# Patient Record
Sex: MALE | Race: WHITE | NOT HISPANIC OR LATINO | Employment: OTHER | URBAN - METROPOLITAN AREA
[De-identification: names, ages, dates, MRNs, and addresses within clinical notes are randomized per-mention and may not be internally consistent; named-entity substitution may affect disease eponyms.]

---

## 2022-01-21 ENCOUNTER — OFFICE VISIT (OUTPATIENT)
Dept: PRIMARY CARE CLINIC | Facility: CLINIC | Age: 72
End: 2022-01-21
Payer: COMMERCIAL

## 2022-01-21 ENCOUNTER — TELEPHONE (OUTPATIENT)
Dept: OPHTHALMOLOGY | Facility: CLINIC | Age: 72
End: 2022-01-21
Payer: MEDICARE

## 2022-01-21 ENCOUNTER — HOSPITAL ENCOUNTER (OUTPATIENT)
Dept: RADIOLOGY | Facility: HOSPITAL | Age: 72
Discharge: HOME | End: 2022-01-21
Attending: FAMILY MEDICINE
Payer: COMMERCIAL

## 2022-01-21 VITALS
OXYGEN SATURATION: 97 % | RESPIRATION RATE: 16 BRPM | SYSTOLIC BLOOD PRESSURE: 134 MMHG | TEMPERATURE: 98 F | HEIGHT: 71 IN | BODY MASS INDEX: 26.55 KG/M2 | DIASTOLIC BLOOD PRESSURE: 72 MMHG | WEIGHT: 189.63 LBS | HEART RATE: 60 BPM

## 2022-01-21 DIAGNOSIS — Z00.00 ANNUAL PHYSICAL EXAM: Primary | ICD-10-CM

## 2022-01-21 DIAGNOSIS — Z12.5 SCREENING FOR PROSTATE CANCER: ICD-10-CM

## 2022-01-21 DIAGNOSIS — E03.9 HYPOTHYROIDISM, UNSPECIFIED TYPE: ICD-10-CM

## 2022-01-21 DIAGNOSIS — R35.1 BENIGN PROSTATIC HYPERPLASIA WITH NOCTURIA: ICD-10-CM

## 2022-01-21 DIAGNOSIS — H53.2 DOUBLE VISION: ICD-10-CM

## 2022-01-21 DIAGNOSIS — M25.561 ACUTE PAIN OF RIGHT KNEE: ICD-10-CM

## 2022-01-21 DIAGNOSIS — N40.1 BENIGN PROSTATIC HYPERPLASIA WITH NOCTURIA: ICD-10-CM

## 2022-01-21 DIAGNOSIS — E78.2 MIXED HYPERLIPIDEMIA: ICD-10-CM

## 2022-01-21 PROBLEM — N40.0 BPH (BENIGN PROSTATIC HYPERPLASIA): Status: ACTIVE | Noted: 2022-01-21

## 2022-01-21 PROBLEM — E78.5 HYPERLIPIDEMIA: Status: ACTIVE | Noted: 2022-01-21

## 2022-01-21 PROCEDURE — 99203 OFFICE O/P NEW LOW 30 MIN: CPT | Mod: S$GLB,,, | Performed by: FAMILY MEDICINE

## 2022-01-21 PROCEDURE — 73560 XR KNEE ORTHO RIGHT: ICD-10-PCS | Mod: 26,LT,, | Performed by: RADIOLOGY

## 2022-01-21 PROCEDURE — 99999 PR PBB SHADOW E&M-EST. PATIENT-LVL V: ICD-10-PCS | Mod: PBBFAC,,, | Performed by: FAMILY MEDICINE

## 2022-01-21 PROCEDURE — 73562 X-RAY EXAM OF KNEE 3: CPT | Mod: 26,RT,, | Performed by: RADIOLOGY

## 2022-01-21 PROCEDURE — 73562 X-RAY EXAM OF KNEE 3: CPT | Mod: TC,PN,RT

## 2022-01-21 PROCEDURE — 73562 XR KNEE ORTHO RIGHT: ICD-10-PCS | Mod: 26,RT,, | Performed by: RADIOLOGY

## 2022-01-21 PROCEDURE — 99203 PR OFFICE/OUTPT VISIT, NEW, LEVL III, 30-44 MIN: ICD-10-PCS | Mod: S$GLB,,, | Performed by: FAMILY MEDICINE

## 2022-01-21 PROCEDURE — 99999 PR PBB SHADOW E&M-EST. PATIENT-LVL V: CPT | Mod: PBBFAC,,, | Performed by: FAMILY MEDICINE

## 2022-01-21 PROCEDURE — 73560 X-RAY EXAM OF KNEE 1 OR 2: CPT | Mod: 26,LT,, | Performed by: RADIOLOGY

## 2022-01-21 PROCEDURE — 73560 X-RAY EXAM OF KNEE 1 OR 2: CPT | Mod: 59,TC,PN,LT

## 2022-01-21 PROCEDURE — 99215 OFFICE O/P EST HI 40 MIN: CPT | Mod: PBBFAC,PN | Performed by: FAMILY MEDICINE

## 2022-01-21 RX ORDER — LEVOTHYROXINE SODIUM 175 UG/1
175 CAPSULE ORAL DAILY
COMMUNITY
End: 2022-01-21 | Stop reason: SDUPTHER

## 2022-01-21 RX ORDER — LEVOTHYROXINE SODIUM 175 UG/1
175 CAPSULE ORAL DAILY
Qty: 90 CAPSULE | Refills: 1 | Status: SHIPPED | OUTPATIENT
Start: 2022-01-21 | End: 2022-07-13

## 2022-01-21 RX ORDER — ATORVASTATIN CALCIUM 40 MG/1
40 TABLET, FILM COATED ORAL DAILY
COMMUNITY
End: 2022-01-21 | Stop reason: SDUPTHER

## 2022-01-21 RX ORDER — TADALAFIL 5 MG/1
5 TABLET ORAL
COMMUNITY
End: 2022-01-21 | Stop reason: SDUPTHER

## 2022-01-21 RX ORDER — TADALAFIL 5 MG/1
5 TABLET ORAL DAILY PRN
Qty: 90 TABLET | Refills: 1 | Status: SHIPPED | OUTPATIENT
Start: 2022-01-21 | End: 2022-09-03 | Stop reason: SDUPTHER

## 2022-01-21 RX ORDER — FINASTERIDE 5 MG/1
5 TABLET, FILM COATED ORAL DAILY
Qty: 90 TABLET | Refills: 1 | Status: SHIPPED | OUTPATIENT
Start: 2022-01-21 | End: 2022-09-03 | Stop reason: SDUPTHER

## 2022-01-21 RX ORDER — FINASTERIDE 5 MG/1
5 TABLET, FILM COATED ORAL DAILY
COMMUNITY
End: 2022-01-21 | Stop reason: SDUPTHER

## 2022-01-21 RX ORDER — ATORVASTATIN CALCIUM 40 MG/1
40 TABLET, FILM COATED ORAL DAILY
Qty: 90 TABLET | Refills: 1 | Status: SHIPPED | OUTPATIENT
Start: 2022-01-21 | End: 2022-08-31

## 2022-01-21 NOTE — PATIENT INSTRUCTIONS
1.  Get labs drawn, they are fasting  2. We will get records and immunize if any needed  3. Get Mri knee  4. Start physical therapy, they will call you  5. Make appt with ophthalmology - scheduling should call you

## 2022-01-21 NOTE — PROGRESS NOTES
[facility] Note    Chief Complaint      Chief Complaint   Patient presents with    Establish Care       History of Present Illness      Thiago Thacker is a 72 y.o. male with chronic conditions of HLD, hypothyroid, BPH, vitiligo who presents today for:  Annual    Moved from [place] 5 months ago  Screening:   Colon- 2018 - repeat 5 years   PSA- due    Immunizations:   COVID- boost 10/21   Flu- 2021   Shingles- completed   Pneumonia- completed   Tetanus - unknown    Activity - awake 530, workout daily  Diet - oatmeal, meditteranean diet, pescatarian    No tob  Rare alcohol    Right knee pain 2 weeks, thinks pulled hamstring, gives out, limiting activity.  He reports recent eye exam and new glasses in [place] but feels vision in his right eye is blurred and sometimes double since.  Requests ophtho referral    Patient Care Team:  Traci Oconnell MD as PCP - General (Internal Medicine)     Health Maintenance:    There is no immunization history on file for this patient.   Health Maintenance   Topic Date Due    Hepatitis C Screening  Never done    Lipid Panel  Never done    TETANUS VACCINE  Never done        Past Medical History:  History reviewed. No pertinent past medical history.    Past Surgical History:   has a past surgical history that includes Inguinal hernia repair; Complete laser photovaporization of prostate; vein; and Blepharoplasty.    Family History:  family history includes Colon cancer (age of onset: 80) in his mother.     Social History:  Social History     Tobacco Use    Smoking status: Never Smoker    Smokeless tobacco: Never Used   Substance Use Topics    Alcohol use: Yes     Alcohol/week: 1.0 standard drink     Types: 1 Glasses of wine per week    Drug use: Never       Review of Systems   Constitutional: Negative for fever.   Respiratory: Negative for shortness of breath.    Cardiovascular: Negative for chest pain.   Gastrointestinal: Negative for change in bowel habit and change in  "bowel habit.   Genitourinary: Negative for bladder incontinence.        Medications:    Current Outpatient Medications:     atorvastatin (LIPITOR) 40 MG tablet, Take 1 tablet (40 mg total) by mouth once daily., Disp: 90 tablet, Rfl: 1    finasteride (PROSCAR) 5 mg tablet, Take 1 tablet (5 mg total) by mouth once daily., Disp: 90 tablet, Rfl: 1    levothyroxine 175 mcg Cap, Take 175 mcg by mouth once daily., Disp: 90 capsule, Rfl: 1    tadalafiL (CIALIS) 5 MG tablet, Take 1 tablet (5 mg total) by mouth daily as needed for Erectile Dysfunction., Disp: 90 tablet, Rfl: 1     Allergies:  Review of patient's allergies indicates:  No Known Allergies    Physical Exam      Vital Signs  Temp: 98.1 °F (36.7 °C)  Pulse: 60  Resp: 16  SpO2: 97 %  BP: 134/72  BP Location: Right arm  Pain Score: 0-No pain  Height and Weight  Height: 5' 11" (180.3 cm)  Weight: 86 kg (189 lb 9.5 oz)  BSA (Calculated - sq m): 2.08 sq meters  BMI (Calculated): 26.5  Weight in (lb) to have BMI = 25: 178.9             Physical Exam  Constitutional:       General: He is not in acute distress.     Appearance: Normal appearance.   HENT:      Right Ear: Tympanic membrane, ear canal and external ear normal.      Left Ear: Tympanic membrane, ear canal and external ear normal.      Nose: Nose normal.      Mouth/Throat:      Mouth: Mucous membranes are moist.      Pharynx: Oropharynx is clear. No oropharyngeal exudate or posterior oropharyngeal erythema.   Eyes:      Extraocular Movements: Extraocular movements intact.      Conjunctiva/sclera: Conjunctivae normal.      Pupils: Pupils are equal, round, and reactive to light.   Cardiovascular:      Rate and Rhythm: Normal rate and regular rhythm.      Pulses: Normal pulses.      Heart sounds: No murmur heard.  No friction rub. No gallop.    Pulmonary:      Effort: Pulmonary effort is normal.      Breath sounds: No wheezing, rhonchi or rales.   Abdominal:      General: Abdomen is flat. Bowel sounds are normal. " There is no distension.      Palpations: Abdomen is soft. There is no mass.      Tenderness: There is no abdominal tenderness.   Musculoskeletal:      Cervical back: Neck supple.      Right lower leg: No edema.      Left lower leg: No edema.      Comments: Bilateral knees - no edema erythema laxity or joint line tenderness, neg anterior/posterior drawer, neg candi   Lymphadenopathy:      Cervical: No cervical adenopathy.   Skin:     General: Skin is warm and dry.      Comments: Vitiligo changes to upper and lower extremities.    Neurological:      General: No focal deficit present.      Mental Status: He is alert.   Psychiatric:         Mood and Affect: Mood normal.         Behavior: Behavior normal.          Laboratory:  CBC:        CMP:            URINALYSIS:         LIPIDS:        TSH:        A1C:        Urine Microalbumin/Cr:          Other:             Assessment/Plan     Thiago Thacker is a 72 y.o.male with:    Annual physical exam  -     CBC Auto Differential; Future; Expected date: 01/21/2022  -     Comprehensive Metabolic Panel; Future; Expected date: 01/21/2022  -     Lipid Panel; Future; Expected date: 01/21/2022  -     HIV 1/2 Ag/Ab (4th Gen); Future; Expected date: 01/21/2022  -     Hepatitis C Antibody; Future; Expected date: 01/21/2022  Physical activity, diet, healthy lifestyle, alcohol, tobacco, screenings and immunizations discussed.    Benign prostatic hyperplasia with nocturia  - stable, continue current medication - tamsulosin and finasteride    Mixed hyperlipidemia  -     CBC Auto Differential; Future; Expected date: 01/21/2022  -     Lipid Panel; Future; Expected date: 01/21/2022  - stable, continue current medication    Hypothyroidism, unspecified type  -     CBC Auto Differential; Future; Expected date: 01/21/2022  -     T4, Free; Future; Expected date: 01/21/2022  -     TSH; Future; Expected date: 01/21/2022  - stable, continue current medication    Screening for prostate cancer  -     PSA,  Screening; Future; Expected date: 01/21/2022    Acute pain of right knee  -     X-ray Knee Ortho Right; Future; Expected date: 01/21/2022  -     Ambulatory referral/consult to Physical/Occupational Therapy; Future; Expected date: 01/28/2022  -     MRI Knee Without Contrast Right; Future; Expected date: 01/21/2022  Suspect meniscal injury, xray, MRI, PT. Declines meds    Double vision  -     Ambulatory referral/consult to Ophthalmology; Future; Expected date: 01/28/2022    Other orders  -     atorvastatin (LIPITOR) 40 MG tablet; Take 1 tablet (40 mg total) by mouth once daily.  Dispense: 90 tablet; Refill: 1  -     finasteride (PROSCAR) 5 mg tablet; Take 1 tablet (5 mg total) by mouth once daily.  Dispense: 90 tablet; Refill: 1  -     levothyroxine 175 mcg Cap; Take 175 mcg by mouth once daily.  Dispense: 90 capsule; Refill: 1  -     tadalafiL (CIALIS) 5 MG tablet; Take 1 tablet (5 mg total) by mouth daily as needed for Erectile Dysfunction.  Dispense: 90 tablet; Refill: 1         Chronic conditions status updated as per HPI.  Other than changes above, cont current medications and maintain follow up with specialists.  Return to clinic in Follow up in about 1 year (around 1/21/2023).      Traci Oconnell MD  [facility]

## 2022-01-21 NOTE — TELEPHONE ENCOUNTER
----- Message from Radha Eduardo MA sent at 1/21/2022 11:42 AM CST -----    ----- Message -----  From: Grace Solitario  Sent: 1/21/2022  10:57 AM CST  To: [facility] Clinical Support Staff    Dr. Traci Oconnell has put in a referral for Consult to Ophthalmology. Please assist in scheduling.    Double vision [H53.2]    Thanks

## 2022-01-25 ENCOUNTER — PATIENT MESSAGE (OUTPATIENT)
Dept: PRIMARY CARE CLINIC | Facility: CLINIC | Age: 72
End: 2022-01-25
Payer: MEDICARE

## 2022-01-25 ENCOUNTER — LAB VISIT (OUTPATIENT)
Dept: LAB | Facility: HOSPITAL | Age: 72
End: 2022-01-25
Attending: FAMILY MEDICINE
Payer: MEDICARE

## 2022-01-25 DIAGNOSIS — Z00.00 ANNUAL PHYSICAL EXAM: ICD-10-CM

## 2022-01-25 DIAGNOSIS — E03.9 HYPOTHYROIDISM, UNSPECIFIED TYPE: ICD-10-CM

## 2022-01-25 DIAGNOSIS — E78.2 MIXED HYPERLIPIDEMIA: ICD-10-CM

## 2022-01-25 DIAGNOSIS — Z12.5 SCREENING FOR PROSTATE CANCER: ICD-10-CM

## 2022-01-25 LAB
ALBUMIN SERPL BCP-MCNC: 3.6 G/DL (ref 3.5–5.2)
ALP SERPL-CCNC: 96 U/L (ref 55–135)
ALT SERPL W/O P-5'-P-CCNC: 27 U/L (ref 10–44)
ANION GAP SERPL CALC-SCNC: 10 MMOL/L (ref 8–16)
AST SERPL-CCNC: 27 U/L (ref 10–40)
BASOPHILS # BLD AUTO: 0.04 K/UL (ref 0–0.2)
BASOPHILS NFR BLD: 1 % (ref 0–1.9)
BILIRUB SERPL-MCNC: 0.7 MG/DL (ref 0.1–1)
BUN SERPL-MCNC: 25 MG/DL (ref 8–23)
CALCIUM SERPL-MCNC: 9.5 MG/DL (ref 8.7–10.5)
CHLORIDE SERPL-SCNC: 104 MMOL/L (ref 95–110)
CHOLEST SERPL-MCNC: 118 MG/DL (ref 120–199)
CHOLEST/HDLC SERPL: 2.2 {RATIO} (ref 2–5)
CO2 SERPL-SCNC: 26 MMOL/L (ref 23–29)
COMPLEXED PSA SERPL-MCNC: 0.64 NG/ML (ref 0–4)
CREAT SERPL-MCNC: 1 MG/DL (ref 0.5–1.4)
DIFFERENTIAL METHOD: ABNORMAL
EOSINOPHIL # BLD AUTO: 0 K/UL (ref 0–0.5)
EOSINOPHIL NFR BLD: 0.5 % (ref 0–8)
ERYTHROCYTE [DISTWIDTH] IN BLOOD BY AUTOMATED COUNT: 12.1 % (ref 11.5–14.5)
EST. GFR  (AFRICAN AMERICAN): >60 ML/MIN/1.73 M^2
EST. GFR  (NON AFRICAN AMERICAN): >60 ML/MIN/1.73 M^2
GLUCOSE SERPL-MCNC: 98 MG/DL (ref 70–110)
HCT VFR BLD AUTO: 44.8 % (ref 40–54)
HCV AB SERPL QL IA: NEGATIVE
HDLC SERPL-MCNC: 54 MG/DL (ref 40–75)
HDLC SERPL: 45.8 % (ref 20–50)
HGB BLD-MCNC: 14.8 G/DL (ref 14–18)
HIV 1+2 AB+HIV1 P24 AG SERPL QL IA: NEGATIVE
IMM GRANULOCYTES # BLD AUTO: 0.02 K/UL (ref 0–0.04)
IMM GRANULOCYTES NFR BLD AUTO: 0.5 % (ref 0–0.5)
LDLC SERPL CALC-MCNC: 56.6 MG/DL (ref 63–159)
LYMPHOCYTES # BLD AUTO: 0.7 K/UL (ref 1–4.8)
LYMPHOCYTES NFR BLD: 17.9 % (ref 18–48)
MCH RBC QN AUTO: 32 PG (ref 27–31)
MCHC RBC AUTO-ENTMCNC: 33 G/DL (ref 32–36)
MCV RBC AUTO: 97 FL (ref 82–98)
MONOCYTES # BLD AUTO: 0.3 K/UL (ref 0.3–1)
MONOCYTES NFR BLD: 8.6 % (ref 4–15)
NEUTROPHILS # BLD AUTO: 2.8 K/UL (ref 1.8–7.7)
NEUTROPHILS NFR BLD: 71.5 % (ref 38–73)
NONHDLC SERPL-MCNC: 64 MG/DL
NRBC BLD-RTO: 0 /100 WBC
PLATELET # BLD AUTO: 113 K/UL (ref 150–450)
PMV BLD AUTO: 11.1 FL (ref 9.2–12.9)
POTASSIUM SERPL-SCNC: 4.1 MMOL/L (ref 3.5–5.1)
PROT SERPL-MCNC: 6.9 G/DL (ref 6–8.4)
RBC # BLD AUTO: 4.62 M/UL (ref 4.6–6.2)
SODIUM SERPL-SCNC: 140 MMOL/L (ref 136–145)
T4 FREE SERPL-MCNC: 0.98 NG/DL (ref 0.71–1.51)
TRIGL SERPL-MCNC: 37 MG/DL (ref 30–150)
TSH SERPL DL<=0.005 MIU/L-ACNC: 3.41 UIU/ML (ref 0.4–4)
WBC # BLD AUTO: 3.97 K/UL (ref 3.9–12.7)

## 2022-01-25 PROCEDURE — 85025 COMPLETE CBC W/AUTO DIFF WBC: CPT | Performed by: FAMILY MEDICINE

## 2022-01-25 PROCEDURE — 80053 COMPREHEN METABOLIC PANEL: CPT | Performed by: FAMILY MEDICINE

## 2022-01-25 PROCEDURE — 84153 ASSAY OF PSA TOTAL: CPT | Performed by: FAMILY MEDICINE

## 2022-01-25 PROCEDURE — 84443 ASSAY THYROID STIM HORMONE: CPT | Performed by: FAMILY MEDICINE

## 2022-01-25 PROCEDURE — 87389 HIV-1 AG W/HIV-1&-2 AB AG IA: CPT | Performed by: FAMILY MEDICINE

## 2022-01-25 PROCEDURE — 86803 HEPATITIS C AB TEST: CPT | Performed by: FAMILY MEDICINE

## 2022-01-25 PROCEDURE — 36415 COLL VENOUS BLD VENIPUNCTURE: CPT | Mod: PN | Performed by: FAMILY MEDICINE

## 2022-01-25 PROCEDURE — 84439 ASSAY OF FREE THYROXINE: CPT | Performed by: FAMILY MEDICINE

## 2022-01-25 PROCEDURE — 80061 LIPID PANEL: CPT | Performed by: FAMILY MEDICINE

## 2022-01-25 RX ORDER — LEVOTHYROXINE SODIUM 175 UG/1
175 TABLET ORAL
Qty: 30 TABLET | Refills: 11 | Status: SHIPPED | OUTPATIENT
Start: 2022-01-25 | End: 2022-09-03 | Stop reason: SDUPTHER

## 2022-01-26 ENCOUNTER — PATIENT MESSAGE (OUTPATIENT)
Dept: PRIMARY CARE CLINIC | Facility: CLINIC | Age: 72
End: 2022-01-26
Payer: MEDICARE

## 2022-01-26 DIAGNOSIS — R01.1 HEART MURMUR: Primary | ICD-10-CM

## 2022-01-26 DIAGNOSIS — D69.6 THROMBOCYTOPENIA: ICD-10-CM

## 2022-01-26 RX ORDER — ASPIRIN 81 MG/1
81 TABLET ORAL DAILY
COMMUNITY

## 2022-01-26 NOTE — TELEPHONE ENCOUNTER
Fish oil & aspirin updated on pt's medication list, as reported in the message. Please see MyChart msg.

## 2022-01-27 RX ORDER — MULTIVITAMIN
1 TABLET ORAL DAILY
COMMUNITY

## 2022-01-28 ENCOUNTER — HOSPITAL ENCOUNTER (OUTPATIENT)
Dept: RADIOLOGY | Facility: HOSPITAL | Age: 72
Discharge: HOME | End: 2022-01-28
Attending: FAMILY MEDICINE
Payer: COMMERCIAL

## 2022-01-28 DIAGNOSIS — M25.561 ACUTE PAIN OF RIGHT KNEE: ICD-10-CM

## 2022-01-28 PROCEDURE — 73721 MRI JNT OF LWR EXTRE W/O DYE: CPT | Mod: TC,RT

## 2022-01-28 PROCEDURE — 73721 MRI JNT OF LWR EXTRE W/O DYE: CPT | Mod: 26,RT,, | Performed by: RADIOLOGY

## 2022-01-28 PROCEDURE — 73721 MRI KNEE WITHOUT CONTRAST RIGHT: ICD-10-PCS | Mod: 26,RT,, | Performed by: RADIOLOGY

## 2022-01-30 ENCOUNTER — PATIENT MESSAGE (OUTPATIENT)
Dept: PRIMARY CARE CLINIC | Facility: CLINIC | Age: 72
End: 2022-01-30
Payer: MEDICARE

## 2022-01-30 DIAGNOSIS — M17.11 OSTEOARTHRITIS OF RIGHT KNEE, UNSPECIFIED OSTEOARTHRITIS TYPE: Primary | ICD-10-CM

## 2022-01-30 DIAGNOSIS — S83.206A TEAR OF MENISCUS OF RIGHT KNEE, UNSPECIFIED MENISCUS, UNSPECIFIED TEAR TYPE, UNSPECIFIED WHETHER OLD OR CURRENT TEAR: ICD-10-CM

## 2022-02-02 DIAGNOSIS — M25.561 CHRONIC PAIN OF RIGHT KNEE: Primary | ICD-10-CM

## 2022-02-02 DIAGNOSIS — G89.29 CHRONIC PAIN OF RIGHT KNEE: Primary | ICD-10-CM

## 2022-02-02 NOTE — PROGRESS NOTES
CC: Right knee pain    72 y.o. Male who presents as a new patient to me. Patient referred from Dr. Traci Oconnell.  Complaint is right knee pain.  Medially based predominantly.  He does have some patellofemoral complaints.  Pain present with high impact activity.  Active gentleman.  Runs and lifts weights.  Denies any specific injury mechanism.  Onset after a drive to [place].  Treatment has included activity modifications, rest.  He does describe some intermittent clicking and catching in the knee.  Denies any significant pre-existing issues.  Localizes some hamstring related soreness over the posterior aspect of the knee.  Nothing more proximal.  No radicular complaints.  No low back pain at this time.    REVIEW OF SYSTEMS:   Constitution: Negative. Negative for chills, fever and night sweats.    Hematologic/Lymphatic: Negative for bleeding problem. Does not bruise/bleed easily.   Skin: Negative for dry skin, itching and rash.   Musculoskeletal: Negative for falls. Positive for right knee pain and muscle weakness.     All other review of symptoms were reviewed and found to be noncontributory.     PAST MEDICAL HISTORY:   History reviewed. No pertinent past medical history.    PAST SURGICAL HISTORY:   Past Surgical History:   Procedure Laterality Date    BLEPHAROPLASTY      COMPLETE LASER PHOTOVAPORIZATION OF PROSTATE      INGUINAL HERNIA REPAIR      vein         FAMILY HISTORY:   Family History   Problem Relation Age of Onset    Colon cancer Mother 80       SOCIAL HISTORY:   Social History     Socioeconomic History    Marital status: Single   Tobacco Use    Smoking status: Never Smoker    Smokeless tobacco: Never Used   Substance and Sexual Activity    Alcohol use: Yes     Alcohol/week: 1.0 standard drink     Types: 1 Glasses of wine per week    Drug use: Never    Sexual activity: Not Currently     Partners: Female       MEDICATIONS:     Current Outpatient Medications:     arginine/ornithine/lysine  (ARGININE-LYSINE-ORNITHINE ORAL), Take 1 capsule by mouth once daily., Disp: , Rfl:     ascorbate calcium (RUPERT-C ORAL), Take 1 capsule by mouth once daily., Disp: , Rfl:     aspirin (ECOTRIN) 81 MG EC tablet, Take 81 mg by mouth once daily., Disp: , Rfl:     atorvastatin (LIPITOR) 40 MG tablet, Take 1 tablet (40 mg total) by mouth once daily., Disp: 90 tablet, Rfl: 1    finasteride (PROSCAR) 5 mg tablet, Take 1 tablet (5 mg total) by mouth once daily., Disp: 90 tablet, Rfl: 1    glucos-msm-collagen-C-Mn-hrb21 500-333-5 mg Cap, Take 1 capsule by mouth once daily., Disp: , Rfl:     GLYCINE ORAL, Take 1 capsule by mouth once daily. Glycine + N-Acetyl-Cysteine, Disp: , Rfl:     GREEN TEA EXTRACT ORAL, Take 1 capsule by mouth once daily., Disp: , Rfl:     levothyroxine (SYNTHROID, LEVOTHROID) 175 MCG tablet, Take 1 tablet (175 mcg total) by mouth before breakfast., Disp: 30 tablet, Rfl: 11    multivitamin (THERAGRAN) per tablet, Take 1 tablet by mouth once daily., Disp: , Rfl:     omega-3/dha/epa/fish oil (OMEGA-3 FISH OIL ORAL), Take 4 g by mouth once daily., Disp: , Rfl:     QUERCETIN DIHYDRATE, BULK, MISC, by Misc.(Non-Drug; Combo Route) route. Quercetin, Disp: , Rfl:     RESVERATROL ORAL, Take by mouth. Resveratrol (Longevinex formulation), Disp: , Rfl:     rutin/quercetin/bioflav/bilber (BILBERRY EXTRACT ORAL), Take 1 capsule by mouth once daily., Disp: , Rfl:     tadalafiL (CIALIS) 5 MG tablet, Take 1 tablet (5 mg total) by mouth daily as needed for Erectile Dysfunction., Disp: 90 tablet, Rfl: 1    turmeric (CURCUMIN MISC), by Misc.(Non-Drug; Combo Route) route., Disp: , Rfl:     ubidecarenone (COQ-10 ORAL), Take 1 capsule by mouth once daily., Disp: , Rfl:     vit C/E/Zn/coppr/lutein/zeaxan (PRESERVISION AREDS-2 ORAL), Take by mouth. AREDS 2 (B & L, Disp: , Rfl:     celecoxib (CELEBREX) 200 MG capsule, Take 1 capsule (200 mg total) by mouth once daily., Disp: 60 capsule, Rfl: 2     "levothyroxine 175 mcg Cap, Take 175 mcg by mouth once daily., Disp: 90 capsule, Rfl: 1    ALLERGIES:   Review of patient's allergies indicates:  No Known Allergies     PHYSICAL EXAMINATION:  /87   Pulse 63   Ht 5' 11" (1.803 m)   Wt 85.7 kg (189 lb)   BMI 26.36 kg/m²   General: Well-developed well-nourished 72 y.o. malein no acute distress   Cardiovascular: Regular rhythm by palpation of distal pulse, normal color and temperature, no concerning varicosities on symptomatic side   Lungs: No labored breathing or wheezing appreciated   Neuro: Alert and oriented ×3   Psychiatric: well oriented to person, place and time, demonstrates normal mood and affect   Skin: No rashes, lesions or ulcers, normal temperature, turgor, and texture on involved extremity    Ortho/SPM Exam  Examination of the right knee demonstrates intact extensor mechanism. No effusion or prepatellar swelling. Central patellar tracking. No patellar apprehension.  Mildly decreased patellar mobility.  Medial greater than lateral joint line tenderness.  Positive Jose Manuel's for pain predominant over the medial joint line.  Ligamentously stable.  Range of motion from full extension to 130° of flexion.  Pain with forced flexion and extension.    IMAGING:  X-rays including standing, weight bearing AP and flexion bilateral knees, RIGHT knee lateral and sunrise views ordered and images reviewed by me show:     No fracture or dislocation.  No joint effusion.  Patellofemoral joint space narrowing predominantly medially with medial and lateral joint space narrowing     MRI right knee reviewed by me and discussed with patient. Study shows:  1. Complex and horizontal tears body segment/posterior horn medial meniscus with surrounding synovitis.  2. Edema signal about the superficial and deep MCL, likely reactive to aforementioned medial meniscus tear.  Sequela of a sprain can present with similar findings but is felt to be less likely given absent history of " trauma.  3. Tricompartmental osteoarthritis most pronounced within the patellofemoral and medial tibiofemoral compartments.  4. Proximal patellar tendinosis.  5. Quadriceps fat pad edema with a posterior convex margin, findings that can be seen in the setting of quadriceps fat pad impingement.  6. Small joint effusion with synovitis.     ASSESSMENT:      ICD-10-CM ICD-9-CM   1. Osteoarthritis of right knee, unspecified osteoarthritis type  M17.11 715.96   2. Complex tear of medial meniscus of right knee, unspecified whether old or current tear, initial encounter  S83.231A 836.0   3. Old complex tear of lateral meniscus of right knee  M23.200 717.40     PLAN:     -Findings and treatment options were discussed with the patient.  This is a primary degenerative process.  He does have some underlying chondromalacia and DJD.  Initial conservative treatment recommended.  -Proceeded with Left knee CSI.  -Prescribed Celebrex.  -Referral to PT for functional strengthening.  -RTC in 6 weeks as needed.  He does have some underlying mechanical symptoms.  Limited goals arthroscopy is an option should he fail an initial course of non operative treatment.  -All questions answered    Large Joint Aspiration/Injection: R knee    Date/Time: 2/7/2022 3:30 PM  Performed by: JEREMI King MD  Authorized by: JEREMI King MD     Consent Done?:  Yes (Verbal)  Indications:  Pain  Site marked: the procedure site was marked    Timeout: prior to procedure the correct patient, procedure, and site was verified    Prep: patient was prepped and draped in usual sterile fashion      Local anesthesia used?: Yes    Local anesthetic:  Co-phenylcaine spray (0.2% Naropin)  Anesthetic total (ml):  4      Details:  Needle Size:  22 G  Ultrasonic Guidance for needle placement?: No    Approach:  Lateral  Location:  Knee  Site:  R knee  Medications:  40 mg triamcinolone acetonide 40 mg/mL  Patient tolerance:  Patient tolerated the procedure well with  no immediate complications

## 2022-02-07 ENCOUNTER — OFFICE VISIT (OUTPATIENT)
Dept: SPORTS MEDICINE | Facility: CLINIC | Age: 72
End: 2022-02-07
Payer: COMMERCIAL

## 2022-02-07 ENCOUNTER — HOSPITAL ENCOUNTER (OUTPATIENT)
Dept: RADIOLOGY | Facility: HOSPITAL | Age: 72
Discharge: HOME | End: 2022-02-07
Attending: ORTHOPAEDIC SURGERY
Payer: COMMERCIAL

## 2022-02-07 VITALS
SYSTOLIC BLOOD PRESSURE: 132 MMHG | WEIGHT: 189 LBS | DIASTOLIC BLOOD PRESSURE: 87 MMHG | HEIGHT: 71 IN | HEART RATE: 63 BPM | BODY MASS INDEX: 26.46 KG/M2

## 2022-02-07 DIAGNOSIS — M17.11 OSTEOARTHRITIS OF RIGHT KNEE, UNSPECIFIED OSTEOARTHRITIS TYPE: Primary | ICD-10-CM

## 2022-02-07 DIAGNOSIS — M25.561 CHRONIC PAIN OF RIGHT KNEE: ICD-10-CM

## 2022-02-07 DIAGNOSIS — G89.29 CHRONIC PAIN OF RIGHT KNEE: ICD-10-CM

## 2022-02-07 DIAGNOSIS — M23.200 OLD COMPLEX TEAR OF LATERAL MENISCUS OF RIGHT KNEE: ICD-10-CM

## 2022-02-07 DIAGNOSIS — S83.231A COMPLEX TEAR OF MEDIAL MENISCUS OF RIGHT KNEE, UNSPECIFIED WHETHER OLD OR CURRENT TEAR, INITIAL ENCOUNTER: ICD-10-CM

## 2022-02-07 PROCEDURE — 73560 XR KNEE 1 OR 2 VIEW BILATERAL: ICD-10-PCS | Mod: 26,,, | Performed by: RADIOLOGY

## 2022-02-07 PROCEDURE — 99999 PR PBB SHADOW E&M-EST. PATIENT-LVL IV: ICD-10-PCS | Mod: PBBFAC,,, | Performed by: ORTHOPAEDIC SURGERY

## 2022-02-07 PROCEDURE — 99204 OFFICE O/P NEW MOD 45 MIN: CPT | Mod: 25,S$GLB,, | Performed by: ORTHOPAEDIC SURGERY

## 2022-02-07 PROCEDURE — 99999 PR PBB SHADOW E&M-EST. PATIENT-LVL IV: CPT | Mod: PBBFAC,,, | Performed by: ORTHOPAEDIC SURGERY

## 2022-02-07 PROCEDURE — 20610 LARGE JOINT ASPIRATION/INJECTION: R KNEE: ICD-10-PCS | Mod: RT,S$GLB,, | Performed by: ORTHOPAEDIC SURGERY

## 2022-02-07 PROCEDURE — 20610 DRAIN/INJ JOINT/BURSA W/O US: CPT | Mod: PBBFAC,PN,LT | Performed by: ORTHOPAEDIC SURGERY

## 2022-02-07 PROCEDURE — 99214 OFFICE O/P EST MOD 30 MIN: CPT | Mod: PBBFAC,PN,25 | Performed by: ORTHOPAEDIC SURGERY

## 2022-02-07 PROCEDURE — 73560 X-RAY EXAM OF KNEE 1 OR 2: CPT | Mod: TC,50,PN

## 2022-02-07 PROCEDURE — 99204 PR OFFICE/OUTPT VISIT, NEW, LEVL IV, 45-59 MIN: ICD-10-PCS | Mod: 25,S$GLB,, | Performed by: ORTHOPAEDIC SURGERY

## 2022-02-07 PROCEDURE — 73560 X-RAY EXAM OF KNEE 1 OR 2: CPT | Mod: 26,,, | Performed by: RADIOLOGY

## 2022-02-07 RX ORDER — CELECOXIB 200 MG/1
200 CAPSULE ORAL DAILY
Qty: 60 CAPSULE | Refills: 2 | Status: SHIPPED | OUTPATIENT
Start: 2022-02-07 | End: 2022-03-11

## 2022-02-07 RX ADMIN — TRIAMCINOLONE ACETONIDE 40 MG: 40 INJECTION, SUSPENSION INTRA-ARTICULAR; INTRAMUSCULAR at 03:02

## 2022-02-08 ENCOUNTER — CLINICAL SUPPORT (OUTPATIENT)
Dept: REHABILITATION | Facility: HOSPITAL | Age: 72
End: 2022-02-08
Attending: FAMILY MEDICINE
Payer: MEDICARE

## 2022-02-08 DIAGNOSIS — M25.561 ACUTE PAIN OF RIGHT KNEE: ICD-10-CM

## 2022-02-08 DIAGNOSIS — M25.661 DECREASED RANGE OF MOTION (ROM) OF RIGHT KNEE: ICD-10-CM

## 2022-02-08 DIAGNOSIS — M62.81 MUSCLE WEAKNESS OF LOWER EXTREMITY: ICD-10-CM

## 2022-02-08 PROCEDURE — 97161 PT EVAL LOW COMPLEX 20 MIN: CPT | Mod: PO

## 2022-02-08 NOTE — PLAN OF CARE
[facility]   Physical Therapy Initial Evaluation     Date: 2/8/2022   Name: Thiago Thacker  Clinic Number: [id number]    Therapy Diagnosis:   Encounter Diagnoses   Name Primary?    Acute pain of right knee     Muscle weakness of lower extremity     Decreased range of motion (ROM) of right knee      Physician: Traci Oconnell MD    Physician Orders: PT Eval and Treat   Medical Diagnosis from Referral: Acute pain of right knee [M25.561]  Evaluation Date: 2/8/2022  Authorization Period Expiration: 1/21/23  Plan of Care Expiration: 5/8/22  Progress Note Due: 3/10/22  Visit # / Visits authorized: 1/ 1   FOTO: 1/3    Precautions: Standard     Time In: 10:35 am  Time Out: 11:13 am  Total Appointment Time (timed & untimed codes): 43 minutes      SUBJECTIVE     Date of onset: early January (approx 1 month)    History of current condition - Thiago reports: he is a regular exerciser and after a drive up to [place] with daily stops for exercise (treadmill and elliptical followed by weight lifting) his knee began to hurt. He has been having hamstring pain along with knee pain and some soreness in the R foot. He feels fine when not in weightbearing. He received a steroid shot yesterday which has improved the pain. He does not have a specific mechanism of injury. He does experience catching and clicking in the knee    Falls: no    Imaging, x-ray 2/2/22: No acute fractures.  No malalignment.  No significant narrowing of tibiofemoral joint spaces.  Minimal spurring about the tibial spines.  MRI 1/21/22: Impression:  1. Complex and horizontal tears body segment/posterior horn medial meniscus with surrounding synovitis.  2. Edema signal about the superficial and deep MCL, likely reactive to aforementioned medial meniscus tear.  Sequela of a sprain can present with similar findings but is felt to be less likely given absent history of trauma.  3. Tricompartmental osteoarthritis most pronounced within the  patellofemoral and medial tibiofemoral compartments.  4. Proximal patellar tendinosis.  5. Quadriceps fat pad edema with a posterior convex margin, findings that can be seen in the setting of quadriceps fat pad impingement.  6. Small joint effusion with synovitis.    Prior Therapy: not for his knee  Social History:  lives alone, no steps  Occupation: neuropsychologist  Prior Level of Function: independent  Current Level of Function: unable to run, difficulty with walking and elliptical    Pain:  Current 5/10, worst 6/10, best 0/10   Location: right knee      Description: Throbbing  Aggravating Factors: Walking and weight bearing  Easing Factors: stretch every morning    Patients goals: reduce pain, return to running     Medical History:   No past medical history on file.    Surgical History:   Thiago Thacker  has a past surgical history that includes Inguinal hernia repair; Complete laser photovaporization of prostate; vein; and Blepharoplasty.    Medications:   Thiago has a current medication list which includes the following prescription(s): arginine/ornithine/lysine, ascorbate calcium, aspirin, atorvastatin, celecoxib, finasteride, glucos-[facility]-collagen-c-mn-hrb21, glycine, green tea extract, levothyroxine, levothyroxine, multivitamin, omega-3/dha/epa/fish oil, quercetin dihydrate, resveratrol, rutin/quercetin/bioflav/bilber, tadalafil, turmeric, ubidecarenone, and vit c/e/zn/coppr/lutein/zeaxan.    Allergies:   Review of patient's allergies indicates:  No Known Allergies       OBJECTIVE     Observation: Pt does not achieve hip extension during gait. He also demonstrates mild hip drop on RLE, and increased rotation through lumbar spine likely to achieve forward limb advancement.     Functional tests:   SL squat: NT  SLS EO: NT  SLS EC: NT    Range of Motion (Active):   Knee Right  Left    Flexion 125 130   Extension Lacking 4 0       Lower Extremity Strength  Right LE  Left LE    Quadriceps: 4/5 with pain Quadriceps: 5/5    Hamstrings: 4/5 Hamstrings: 4+/5   Hip flexion (seated): 4/5 Hip flexion (seated): 4+/5   Hip extension:  4-/5 Hip extension: 4-/5   PGM: 4-/5 PGM:  4-/5   Hip ABD:  4-/5 Hip ABD:  4+/5     Special Tests:   Right Left   Beltran's Test + -   Thessaly's Test + medial -   Patellar Grind Test + +     Joint Mobility: normal patella mobility in all planes bilaterally     Palpation: tenderness at medial joint line R, tenderness at ATFL. Hypertonicity in hamstrings    Sensation: intact to light touch    Flexibility:    Hamstrings: R = mod limitation ; L = mod limitation    Esther's test: R = no limitation ; L = no limitation   Dylan test: R = pos ; L = pos    Edema: NT but visible swelling inferior to patella          Limitation/Restriction for FOTO knee Survey    Therapist reviewed FOTO scores for Thiago Thacker on 2/8/2022.   FOTO documents entered into EPIC - see Media section.    Limitation Score: 48%         TREATMENT     Total Treatment time (time-based codes) separate from Evaluation: 0 minutes      Thiago received the treatments listed below:      therapeutic exercises to develop strength, endurance, ROM and flexibility for 0 minutes including:  Consider:  Quad sets  Heels slides  SLR  SAQ  LAQ  Bridges  Clams  SL hip abduction  Hamstring stretch  Prone quad stretch  Hip flexor stretch off side of table  TKE    manual therapy techniques: Joint mobilizations and Soft tissue Mobilization were applied for 0 minutes, including:      PATIENT EDUCATION AND HOME EXERCISES     Education provided:   - education on condition    Written Home Exercises Provided: not provided today. Exercises were reviewed and Thiago was able to demonstrate them prior to the end of the session.  Thiago demonstrated good  understanding of the education provided. See EMR under Patient Instructions for exercises provided during therapy sessions.    ASSESSMENT     Thiago is a 72 y.o. male referred to outpatient Physical Therapy with a medical diagnosis of Acute  pain of right knee [M25.561]. Patient presents with limited LE flexibility, positive special tests for meniscus pathology which is confirmed by MRI, decreased LE strength, and limited LE ROM. These deficits have resulted in difficulty with running, walking, and other weightbearing activities.    Patient prognosis is Good.   Patientt will benefit from skilled outpatient Physical Therapy to address the deficits stated above and in the chart below, provide patient /family education, and to maximize patientt's level of independence.     Plan of care discussed with patient: Yes  Patient's spiritual, cultural and educational needs considered and patient is agreeable to the plan of care and goals as stated below:     Anticipated Barriers for therapy: scheduling    Medical Necessity is demonstrated by the following  History  Co-morbidities and personal factors that may impact the plan of care Co-morbidities:   HLD, hypothyroid, BPH    Personal Factors:   age     low   Examination  Body Structures and Functions, activity limitations and participation restrictions that may impact the plan of care Body Regions:   lower extremities    Body Systems:    gross symmetry  ROM  strength  gross coordinated movement  balance  gait    Participation Restrictions:   Running, prolonged sitting, standing, or walking    Activity limitations:   Learning and applying knowledge  no deficits    General Tasks and Commands  no deficits    Communication  no deficits    Mobility  lifting and carrying objects  walking    Self care  no deficits    Domestic Life  shopping  cooking  doing house work (cleaning house, washing dishes, laundry)  assisting others    Interactions/Relationships  no deficits    Life Areas  no deficits    Community and Social Life  community life  recreation and leisure         low   Clinical Presentation stable and uncomplicated low   Decision Making/ Complexity Score: low     Goals:  GOALS: Short Term Goals:  5 weeks  1.Report  decreased R knee pain  < / =  4/10 at worst to increase tolerance for prolonged sitting  2. Increase knee ROM to 0 degrees extension in order to be able to perform ADLs without difficulty.  3. Increase strength by 1/3 MMT grade in BLE  to increase tolerance for ADL and work activities.  4. Pt to tolerate HEP to improve ROM and independence with ADL's    Long Term Goals: 10 weeks  1.Report decreased R knee pain < / = 2/10  to increase tolerance for running  2.Patient goal: Pt to be able to run on at least 4 days per week pain free to return to regular exercise  3.Increase strength to >/= 4+/5 in BLE  to increase tolerance for ADL and work activities.  4. Pt will report at CJ level (20-40% impaired) on FOTO knee to demonstrate increase in LE function with every day tasks.     PLAN   Plan of care Certification: 2/8/2022 to 5/9/2022.    Outpatient Physical Therapy 2 times weekly for 10 weeks to include the following interventions: Gait Training, Manual Therapy, Moist Heat/ Ice, Neuromuscular Re-ed, Patient Education, Therapeutic Activities and Therapeutic Exercise.     Kassi Chang, PT      I CERTIFY THE NEED FOR THESE SERVICES FURNISHED UNDER THIS PLAN OF TREATMENT AND WHILE UNDER MY CARE   Physician's comments:     Physician's Signature: ___________________________________________________

## 2022-02-10 ENCOUNTER — CLINICAL SUPPORT (OUTPATIENT)
Dept: REHABILITATION | Facility: HOSPITAL | Age: 72
End: 2022-02-10
Attending: FAMILY MEDICINE
Payer: MEDICARE

## 2022-02-10 ENCOUNTER — PATIENT MESSAGE (OUTPATIENT)
Dept: PRIMARY CARE CLINIC | Facility: CLINIC | Age: 72
End: 2022-02-10
Payer: MEDICARE

## 2022-02-10 DIAGNOSIS — M25.661 DECREASED RANGE OF MOTION (ROM) OF RIGHT KNEE: ICD-10-CM

## 2022-02-10 DIAGNOSIS — M62.81 MUSCLE WEAKNESS OF LOWER EXTREMITY: ICD-10-CM

## 2022-02-10 DIAGNOSIS — M25.561 ACUTE PAIN OF RIGHT KNEE: ICD-10-CM

## 2022-02-10 PROCEDURE — 97110 THERAPEUTIC EXERCISES: CPT | Mod: PO,CQ

## 2022-02-10 NOTE — PROGRESS NOTES
"[facility]   Physical Therapy Treatment Note     Name: Thiago Thacker  Clinic Number: [id number]    Therapy Diagnosis:   Encounter Diagnoses   Name Primary?    Acute pain of right knee     Muscle weakness of lower extremity     Decreased range of motion (ROM) of right knee      Physician: Traci Oconnell MD    Visit Date: 2/10/2022    Physician: Traci Oconnell MD     Physician Orders: PT Eval and Treat   Medical Diagnosis from Referral: Acute pain of right knee [M25.561]  Evaluation Date: 2/8/2022  Authorization Period Expiration: 1/21/23  Plan of Care Expiration: 5/8/22  Progress Note Due: 3/10/22  Visit # / Visits authorized: 1/ 1, 1/20  FOTO: 1/3     Precautions: Standard     PTA Visit #: 1/5     Time In: 1355  Time Out: 1438  Total Billable Time: 43 minutes    SUBJECTIVE     Pt reports:  Yesterday his R knee was bothering him but that it feels better today.      Response to previous treatment: Initial eval.   Functional change: Ongoing    Pain: 4/10  Location: right knee      OBJECTIVE     Objective Measures updated at progress report unless specified.     Treatment     Thiago received the treatments listed below:      therapeutic exercises to develop strength, endurance, ROM and flexibility for  minutes including:    Upright bike 6'  Quad sets x 20 3"  Heels slides  SLR 2 x 10   SAQ 2 x 10   LAQ 2 x 10 R  Bridges 2 x 10  Clams YTB 2 x 10 B  SL hip abduction 2 x 10 B  Hamstring stretch 3 x 30" B  Prone quad stretch 3 x 30" B  Hip flexor stretch off side of table 1'ea towel roll under buttock   TKE                Patient Education and Home Exercises     Home Exercises Provided and Patient Education Provided     Education provided:     Written Home Exercises Provided:    ASSESSMENT     Pt performed the above exercises this morning with good tolerance and no reports of pain.  Pt required verbal cueing on proper exercise form throughout PT treatment to target desired muscle group and maximize " exercise benefit.  Pt exhibited B hamstring tightness during self stretching exercises.     Thiago Is progressing well towards his goals.   Pt prognosis is Good.     Pt will continue to benefit from skilled outpatient physical therapy to address the deficits listed in the problem list box on initial evaluation, provide pt/family education and to maximize pt's level of independence in the home and community environment.     Pt's spiritual, cultural and educational needs considered and pt agreeable to plan of care and goals.     Anticipated barriers to physical therapy: Scheduling.    Goals:     GOALS: Short Term Goals:  5 weeks  1.Report decreased R knee pain  < / =  4/10 at worst to increase tolerance for prolonged sitting  2. Increase knee ROM to 0 degrees extension in order to be able to perform ADLs without difficulty.  3. Increase strength by 1/3 MMT grade in BLE  to increase tolerance for ADL and work activities.  4. Pt to tolerate HEP to improve ROM and independence with ADL's     Long Term Goals: 10 weeks  1.Report decreased R knee pain < / = 2/10  to increase tolerance for running  2.Patient goal: Pt to be able to run on at least 4 days per week pain free to return to regular exercise  3.Increase strength to >/= 4+/5 in BLE  to increase tolerance for ADL and work activities.  4. Pt will report at CJ level (20-40% impaired) on FOTO knee to demonstrate increase in LE function with every day tasks.     PLAN     Cont. PT POC.     Vincent Turner, PTA

## 2022-02-16 ENCOUNTER — OFFICE VISIT (OUTPATIENT)
Dept: OPTOMETRY | Facility: CLINIC | Age: 72
End: 2022-02-16
Payer: COMMERCIAL

## 2022-02-16 ENCOUNTER — TELEPHONE (OUTPATIENT)
Dept: OPHTHALMOLOGY | Facility: CLINIC | Age: 72
End: 2022-02-16
Payer: MEDICARE

## 2022-02-16 DIAGNOSIS — H52.4 MYOPIA OF BOTH EYES WITH REGULAR ASTIGMATISM AND PRESBYOPIA: ICD-10-CM

## 2022-02-16 DIAGNOSIS — H52.223 MYOPIA OF BOTH EYES WITH REGULAR ASTIGMATISM AND PRESBYOPIA: ICD-10-CM

## 2022-02-16 DIAGNOSIS — H53.2 DOUBLE VISION: Primary | ICD-10-CM

## 2022-02-16 DIAGNOSIS — H18.49 CORNEAL DELLEN OF LEFT EYE: ICD-10-CM

## 2022-02-16 DIAGNOSIS — H04.123 DRY EYE SYNDROME OF BOTH EYES: ICD-10-CM

## 2022-02-16 DIAGNOSIS — H25.13 SENILE NUCLEAR CATARACT, BILATERAL: ICD-10-CM

## 2022-02-16 DIAGNOSIS — H52.13 MYOPIA OF BOTH EYES WITH REGULAR ASTIGMATISM AND PRESBYOPIA: ICD-10-CM

## 2022-02-16 DIAGNOSIS — Z01.00 COMPLETE EYE EXAM, ENCOUNTER FOR: ICD-10-CM

## 2022-02-16 PROCEDURE — 92015 DETERMINE REFRACTIVE STATE: CPT | Mod: S$GLB,,, | Performed by: OPTOMETRIST

## 2022-02-16 PROCEDURE — 92015 PR REFRACTION: ICD-10-PCS | Mod: S$GLB,,, | Performed by: OPTOMETRIST

## 2022-02-16 PROCEDURE — 99999 PR PBB SHADOW E&M-EST. PATIENT-LVL III: ICD-10-PCS | Mod: PBBFAC,,, | Performed by: OPTOMETRIST

## 2022-02-16 PROCEDURE — 99999 PR PBB SHADOW E&M-EST. PATIENT-LVL III: CPT | Mod: PBBFAC,,, | Performed by: OPTOMETRIST

## 2022-02-16 PROCEDURE — 99213 OFFICE O/P EST LOW 20 MIN: CPT | Mod: PBBFAC | Performed by: OPTOMETRIST

## 2022-02-16 PROCEDURE — 92004 PR EYE EXAM, NEW PATIENT,COMPREHESV: ICD-10-PCS | Mod: S$GLB,,, | Performed by: OPTOMETRIST

## 2022-02-16 PROCEDURE — 92004 COMPRE OPH EXAM NEW PT 1/>: CPT | Mod: S$GLB,,, | Performed by: OPTOMETRIST

## 2022-02-16 NOTE — TELEPHONE ENCOUNTER
"----- Message from Shanthi Vizcaino sent at 2/16/2022  1:00 PM CST -----  Regarding: Cataract Eval OD>OS Appointment Request - Dr. 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 Medina today for complaints of blurred vision. She would like for him to be seen by Dr. Addison for cataract eval OD>OS. I was unable to find an appointment for the patient. Can you please contact Dr. Thacker to schedule him for an appointment? His contact number is [phone].    Thanks,    Shanthi"

## 2022-02-16 NOTE — PROGRESS NOTES
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              Comments     CC: Pt Dr. Salitz here for new patient complaints of blurred VA [place]   [place]. Pt states that he has horizontal double VA OD   only for the past 2 months. Pt states that he closes his OS in order for   double VA to go away. Pt states that he ordered his glasses 2 months ago   online. Pt states that he is not sure if double still exists with glasses   off. Patient denies diplopia, headaches, flashes, and pain. Pt states that   he has had floaters in the past but they have gone away.  CED: 2 months ago in [place], [place] ([facility])    (+) Changes in vision   (-) Pain  (-) Irritation   (-) Itching   (-) Flashes  (-) Floaters  (+) Glasses wearer  (-) CL wearer  (+) Uses eye gtts: (+)Systane gtts PRN OU for dry eyes (+)At's gel qhs OU   for dry eyes    Does patient want a refraction today? Yes.    (-) Eye injury  (-) Eye surgery   (+)POHx: (+)cataracts OU  (+)FOHx: (+)ARMD OU - father    (-)DM  No results found for: HGBA1C                 Last edited by Shanthi Vizcaino on 2/16/2022 11:09 AM. (History)            Assessment /Plan     For exam results, see Encounter Report.    Double vision  -     Ambulatory referral/consult to Ophthalmology    Myopia of both eyes with regular astigmatism and presbyopia    Corneal dellen of left eye    Dry eye syndrome of both eyes    Senile nuclear cataract, bilateral    Complete eye exam, encounter for      MONITOR. ED PT ON ALL EXAM FINDINGS.   H/O DIZZINESS X 6 MONTHS AGO; ASYMPTOMATIC AT VISIT. NO NEW SYMPTOMS REPORTED/ CONTINUE WITH PCP  AT'S PRN DISCUSSED   MILD NS OU; NO SURGICAL INTERVENTION NEEDED AT THIS TIME   H/O CORNEAL DELLEN OS, CONTINUE WITH AT'S PRN. CONSIDER K CONSULT IN FUTURE  RTC 1YR//PRN FOR REE/DFE

## 2022-02-21 RX ORDER — TRIAMCINOLONE ACETONIDE 40 MG/ML
40 INJECTION, SUSPENSION INTRA-ARTICULAR; INTRAMUSCULAR
Status: DISCONTINUED | OUTPATIENT
Start: 2022-02-07 | End: 2022-02-21 | Stop reason: HOSPADM

## 2022-02-22 ENCOUNTER — CLINICAL SUPPORT (OUTPATIENT)
Dept: REHABILITATION | Facility: HOSPITAL | Age: 72
End: 2022-02-22
Attending: FAMILY MEDICINE
Payer: COMMERCIAL

## 2022-02-22 DIAGNOSIS — M62.81 MUSCLE WEAKNESS OF LOWER EXTREMITY: ICD-10-CM

## 2022-02-22 DIAGNOSIS — M25.561 ACUTE PAIN OF RIGHT KNEE: Primary | ICD-10-CM

## 2022-02-22 DIAGNOSIS — M25.661 DECREASED RANGE OF MOTION (ROM) OF RIGHT KNEE: ICD-10-CM

## 2022-02-22 PROCEDURE — 97110 THERAPEUTIC EXERCISES: CPT | Mod: PO

## 2022-02-22 NOTE — PROGRESS NOTES
"[facility]   Physical Therapy Treatment Note     Name: Thiago Thacker  Clinic Number: [id number]    Therapy Diagnosis:   Encounter Diagnoses   Name Primary?    Acute pain of right knee Yes    Muscle weakness of lower extremity     Decreased range of motion (ROM) of right knee      Physician: Traci Oconnell MD    Visit Date: 2/22/2022    Physician: Traci Oconnell MD     Physician Orders: PT Eval and Treat   Medical Diagnosis from Referral: Acute pain of right knee [M25.561]  Evaluation Date: 2/8/2022  Authorization Period Expiration: 12/31/22  Plan of Care Expiration: 5/8/22  Progress Note Due: 3/10/22  Visit # / Visits authorized: 2/20 + eval  FOTO: 1/3     Precautions: Standard     PTA Visit #: 0/5     Time In: 4:00 pm  Time Out: 4:45 pm  Total Billable Time: 45 minutes    SUBJECTIVE     Pt reports:  Yesterday his R knee was bothering him but that it feels better today.      Response to previous treatment: felt great   Functional change: Ongoing    Pain: 4/10  Location: right knee      OBJECTIVE     Objective Measures updated at progress report unless specified.     Treatment     Thiago received the treatments listed below:      therapeutic exercises to develop strength, endurance, ROM and flexibility for 45 minutes including:    Upright bike 8' Level 2  Quad sets x 20 3"  Heels slides  SLR 2 x 10 1# R  SAQ 2 x 10 1# R  LAQ 2 x 10 R  Bridges 2 x 10  Clams OTB 2 x 10 B (progress to GTB next visit)  SL hip abduction 2 x 10 B 1#  Hamstring stretch 3 x 30" B  Prone quad stretch 3 x 30" B  Hip flexor stretch off side of table 1'ea towel roll under buttock   TKE      Patient Education and Home Exercises     Home Exercises Provided and Patient Education Provided     Education provided:     Written Home Exercises Provided:    ASSESSMENT     Pt is doing well today with reports of decreased knee pain following first treatment. He is still avoid any weightbearing cardio at this time due to remaining " knee pain. He tolerated exercise well today. Continue to progress both stretching and strengthening.     Thiago Is progressing well towards his goals.   Pt prognosis is Good.     Pt will continue to benefit from skilled outpatient physical therapy to address the deficits listed in the problem list box on initial evaluation, provide pt/family education and to maximize pt's level of independence in the home and community environment.     Pt's spiritual, cultural and educational needs considered and pt agreeable to plan of care and goals.     Anticipated barriers to physical therapy: Scheduling.    Goals:     GOALS: Short Term Goals:  5 weeks  1.Report decreased R knee pain  < / =  4/10 at worst to increase tolerance for prolonged sitting  2. Increase knee ROM to 0 degrees extension in order to be able to perform ADLs without difficulty.  3. Increase strength by 1/3 MMT grade in BLE  to increase tolerance for ADL and work activities.  4. Pt to tolerate HEP to improve ROM and independence with ADL's     Long Term Goals: 10 weeks  1.Report decreased R knee pain < / = 2/10  to increase tolerance for running  2.Patient goal: Pt to be able to run on at least 4 days per week pain free to return to regular exercise  3.Increase strength to >/= 4+/5 in BLE  to increase tolerance for ADL and work activities.  4. Pt will report at CJ level (20-40% impaired) on FOTO knee to demonstrate increase in LE function with every day tasks.     PLAN     Cont. PT POC.     Kassi Chang, PT

## 2022-02-23 ENCOUNTER — PATIENT MESSAGE (OUTPATIENT)
Dept: PRIMARY CARE CLINIC | Facility: CLINIC | Age: 72
End: 2022-02-23
Payer: MEDICARE

## 2022-02-24 ENCOUNTER — CLINICAL SUPPORT (OUTPATIENT)
Dept: REHABILITATION | Facility: HOSPITAL | Age: 72
End: 2022-02-24
Attending: FAMILY MEDICINE
Payer: MEDICARE

## 2022-02-24 DIAGNOSIS — M25.561 ACUTE PAIN OF RIGHT KNEE: Primary | ICD-10-CM

## 2022-02-24 DIAGNOSIS — M62.81 MUSCLE WEAKNESS OF LOWER EXTREMITY: ICD-10-CM

## 2022-02-24 DIAGNOSIS — M25.661 DECREASED RANGE OF MOTION (ROM) OF RIGHT KNEE: ICD-10-CM

## 2022-02-24 PROCEDURE — 97110 THERAPEUTIC EXERCISES: CPT | Mod: PO

## 2022-02-24 NOTE — PROGRESS NOTES
"[facility]   Physical Therapy Treatment Note     Name: Thiago Thacker  Clinic Number: [id number]    Therapy Diagnosis:   Encounter Diagnoses   Name Primary?    Acute pain of right knee Yes    Muscle weakness of lower extremity     Decreased range of motion (ROM) of right knee      Physician: Traci Oconnell MD    Visit Date: 2/24/2022    Physician: Traci Oconnell MD     Physician Orders: PT Eval and Treat   Medical Diagnosis from Referral: Acute pain of right knee [M25.561]  Evaluation Date: 2/8/2022  Authorization Period Expiration: 12/31/22  Plan of Care Expiration: 5/8/22  Progress Note Due: 3/10/22  Visit # / Visits authorized: 4/20 + eval  FOTO: 1/3     Precautions: Standard     PTA Visit #: 0/5     Time In: 1100 am  Time Out: 1140 am  Total Billable Time: 40 minutes    SUBJECTIVE     Pt reports:  Knee is getting better, but cannot tell if its the medicine or the strengthening exercises that is helping but hes getting better    Response to previous treatment: felt great   Functional change: Ongoing    Pain: 2/10  Location: right knee      OBJECTIVE     Objective Measures updated at progress report unless specified.     Treatment     Thiago received the treatments listed below:      therapeutic exercises to develop strength, endurance, ROM and flexibility for 45 minutes including:    Upright bike 6' Level 2  Quad sets x 20 3"  Heels slides  SLR 2 x 10 1# R  SAQ 2 x 10 1# R  LAQ 2 x 10 R 1 #  Bridges 2 x 10 OTB  Clams GTB 2 x 10 B   SL hip abduction 2 x 10 B 1#  Hamstring stretch 3 x 30" B  Prone quad stretch 3 x 30" B  Tandem balance on foam 2 X 45 seconds   Hip flexor stretch off side of table 1'ea towel roll under buttock   TKE      Patient Education and Home Exercises     Home Exercises Provided and Patient Education Provided     Education provided:   -home exercise program reviewed    Written Home Exercises Provided:    ASSESSMENT     Pt responded well to strengthening activities and " balance training in a tandem position. Decreased overall tension in the right knee and improving well with lateral hip muscle engagement with standing balance    Thiago Is progressing well towards his goals.   Pt prognosis is Good.     Pt will continue to benefit from skilled outpatient physical therapy to address the deficits listed in the problem list box on initial evaluation, provide pt/family education and to maximize pt's level of independence in the home and community environment.     Pt's spiritual, cultural and educational needs considered and pt agreeable to plan of care and goals.     Anticipated barriers to physical therapy: Scheduling.    Goals:     GOALS: Short Term Goals:  5 weeks  1.Report decreased R knee pain  < / =  4/10 at worst to increase tolerance for prolonged sitting  2. Increase knee ROM to 0 degrees extension in order to be able to perform ADLs without difficulty.  3. Increase strength by 1/3 MMT grade in BLE  to increase tolerance for ADL and work activities.  4. Pt to tolerate HEP to improve ROM and independence with ADL's     Long Term Goals: 10 weeks  1.Report decreased R knee pain < / = 2/10  to increase tolerance for running  2.Patient goal: Pt to be able to run on at least 4 days per week pain free to return to regular exercise  3.Increase strength to >/= 4+/5 in BLE  to increase tolerance for ADL and work activities.  4. Pt will report at CJ level (20-40% impaired) on FOTO knee to demonstrate increase in LE function with every day tasks.     PLAN     Cont. PT POC.     Sujatha Joyner, PT

## 2022-03-03 ENCOUNTER — CLINICAL SUPPORT (OUTPATIENT)
Dept: REHABILITATION | Facility: HOSPITAL | Age: 72
End: 2022-03-03
Attending: FAMILY MEDICINE
Payer: COMMERCIAL

## 2022-03-03 DIAGNOSIS — M25.661 DECREASED RANGE OF MOTION (ROM) OF RIGHT KNEE: ICD-10-CM

## 2022-03-03 DIAGNOSIS — M25.561 ACUTE PAIN OF RIGHT KNEE: Primary | ICD-10-CM

## 2022-03-03 DIAGNOSIS — M62.81 MUSCLE WEAKNESS OF LOWER EXTREMITY: ICD-10-CM

## 2022-03-03 PROCEDURE — 97112 NEUROMUSCULAR REEDUCATION: CPT | Mod: PO

## 2022-03-03 PROCEDURE — 97110 THERAPEUTIC EXERCISES: CPT | Mod: PO

## 2022-03-03 NOTE — PROGRESS NOTES
"[facility]   Physical Therapy Treatment Note     Name: Thiago Thacker  Clinic Number: [id number]    Therapy Diagnosis:   Encounter Diagnoses   Name Primary?    Acute pain of right knee Yes    Muscle weakness of lower extremity     Decreased range of motion (ROM) of right knee      Physician: Traci Oconnell MD    Visit Date: 3/3/2022    Physician: Traci Oconnell MD     Physician Orders: PT Eval and Treat   Medical Diagnosis from Referral: Acute pain of right knee [M25.561]  Evaluation Date: 2/8/2022  Authorization Period Expiration: 12/31/22  Plan of Care Expiration: 5/8/22  Progress Note Due: 3/10/22  Visit # / Visits authorized: 5/20 + eval  FOTO: 1/3     Precautions: Standard     PTA Visit #: 0/5     Time In: 1105 am  Time Out: 1145 am  Total Billable Time: 40 minutes    SUBJECTIVE     Pt reports:  Knee is getting better, stopped the celebrex due to arm rashes occurring, feeling more discomfort in the HS since the knee is doing better  Response to previous treatment: felt great   Functional change: Ongoing    Pain: 2/10  Location: right knee      OBJECTIVE     MMT painful for R hamstrings - no change with external rotation or internal rotation of the hip  Tenderness to Palpation over medial muscle belly of HS    Treatment     Thiago received the treatments listed below:      therapeutic exercises to develop strength, endurance, ROM and flexibility for 40 minutes including:    Upright bike 6' Level 2  Active HS stretch in supine X 20  Quad sets x 20 3"  Heels slides  SLR 3 x 10 1# L  SAQ 2 x 10 1# R  LAQ 2 x 10 R 1 #  Bridges 3 x 10 GTB  Clams GTB 2 x 10 B   SL hip abduction 2 x 10 B no weight today - pulling into hip flexion too great  Concentric/eccentric HS flex with GT X 30  Seated hip internal rotation/ER with GTB  Step down 1 inch step X 30  Hamstring stretch 2 x 60" B  Prone quad stretch 3 x 30" B  Tandem balance on foam 2 X 45 seconds   Hip flexor stretch off side of table 1'ea towel " roll under buttock   TKE      Patient Education and Home Exercises     Home Exercises Provided and Patient Education Provided     Education provided:   -home exercise program reviewed    Written Home Exercises Provided:    ASSESSMENT     Pt responded well to HS strengthening and exercises based on eccentric HS lengthening and HS/quad co-contraction. He reported an increase in soft tissue release after session and will benefit from weight bearing eccentrics in future sessions    Thiago Is progressing well towards his goals.   Pt prognosis is Good.     Pt will continue to benefit from skilled outpatient physical therapy to address the deficits listed in the problem list box on initial evaluation, provide pt/family education and to maximize pt's level of independence in the home and community environment.     Pt's spiritual, cultural and educational needs considered and pt agreeable to plan of care and goals.     Anticipated barriers to physical therapy: Scheduling.    Goals:     GOALS: Short Term Goals:  5 weeks  1.Report decreased R knee pain  < / =  4/10 at worst to increase tolerance for prolonged sitting  2. Increase knee ROM to 0 degrees extension in order to be able to perform ADLs without difficulty.  3. Increase strength by 1/3 MMT grade in BLE  to increase tolerance for ADL and work activities.  4. Pt to tolerate HEP to improve ROM and independence with ADL's     Long Term Goals: 10 weeks  1.Report decreased R knee pain < / = 2/10  to increase tolerance for running  2.Patient goal: Pt to be able to run on at least 4 days per week pain free to return to regular exercise  3.Increase strength to >/= 4+/5 in BLE  to increase tolerance for ADL and work activities.  4. Pt will report at CJ level (20-40% impaired) on FOTO knee to demonstrate increase in LE function with every day tasks.     PLAN     Cont. PT POC.     Sujatha Joyner, PT

## 2022-03-08 ENCOUNTER — CLINICAL SUPPORT (OUTPATIENT)
Dept: REHABILITATION | Facility: HOSPITAL | Age: 72
End: 2022-03-08
Attending: FAMILY MEDICINE
Payer: COMMERCIAL

## 2022-03-08 ENCOUNTER — HOSPITAL ENCOUNTER (OUTPATIENT)
Dept: CARDIOLOGY | Facility: OTHER | Age: 72
Discharge: HOME | End: 2022-03-08
Attending: FAMILY MEDICINE
Payer: COMMERCIAL

## 2022-03-08 VITALS
SYSTOLIC BLOOD PRESSURE: 132 MMHG | HEIGHT: 71 IN | WEIGHT: 189 LBS | DIASTOLIC BLOOD PRESSURE: 87 MMHG | BODY MASS INDEX: 26.46 KG/M2

## 2022-03-08 DIAGNOSIS — M25.661 DECREASED RANGE OF MOTION (ROM) OF RIGHT KNEE: ICD-10-CM

## 2022-03-08 DIAGNOSIS — M25.561 ACUTE PAIN OF RIGHT KNEE: Primary | ICD-10-CM

## 2022-03-08 DIAGNOSIS — M62.81 MUSCLE WEAKNESS OF LOWER EXTREMITY: ICD-10-CM

## 2022-03-08 DIAGNOSIS — R01.1 HEART MURMUR: ICD-10-CM

## 2022-03-08 LAB
ASCENDING AORTA: 3.25 CM
AV INDEX (PROSTH): 0.74
AV MEAN GRADIENT: 8 MMHG
AV PEAK GRADIENT: 14 MMHG
AV VALVE AREA: 2.95 CM2
AV VELOCITY RATIO: 0.81
BSA FOR ECHO PROCEDURE: 2.07 M2
CV ECHO LV RWT: 0.37 CM
DOP CALC AO PEAK VEL: 1.86 M/S
DOP CALC AO VTI: 41.85 CM
DOP CALC LVOT AREA: 4 CM2
DOP CALC LVOT DIAMETER: 2.26 CM
DOP CALC LVOT PEAK VEL: 1.5 M/S
DOP CALC LVOT STROKE VOLUME: 123.33 CM3
DOP CALCLVOT PEAK VEL VTI: 30.76 CM
E WAVE DECELERATION TIME: 331.44 MSEC
E/A RATIO: 1.22
E/E' RATIO: 7.05 M/S
ECHO LV POSTERIOR WALL: 0.94 CM (ref 0.6–1.1)
EJECTION FRACTION: 65 %
FRACTIONAL SHORTENING: 38 % (ref 28–44)
INTERVENTRICULAR SEPTUM: 0.98 CM (ref 0.6–1.1)
IVRT: 121.79 MSEC
LA MAJOR: 5.07 CM
LA MINOR: 5.41 CM
LA WIDTH: 3.6 CM
LEFT ATRIUM SIZE: 3.6 CM
LEFT ATRIUM VOLUME INDEX MOD: 23.8 ML/M2
LEFT ATRIUM VOLUME INDEX: 28 ML/M2
LEFT ATRIUM VOLUME MOD: 49 CM3
LEFT ATRIUM VOLUME: 57.66 CM3
LEFT INTERNAL DIMENSION IN SYSTOLE: 3.13 CM (ref 2.1–4)
LEFT VENTRICLE DIASTOLIC VOLUME INDEX: 59.56 ML/M2
LEFT VENTRICLE DIASTOLIC VOLUME: 122.7 ML
LEFT VENTRICLE MASS INDEX: 86 G/M2
LEFT VENTRICLE SYSTOLIC VOLUME INDEX: 18.8 ML/M2
LEFT VENTRICLE SYSTOLIC VOLUME: 38.77 ML
LEFT VENTRICULAR INTERNAL DIMENSION IN DIASTOLE: 5.08 CM (ref 3.5–6)
LEFT VENTRICULAR MASS: 176.9 G
LV LATERAL E/E' RATIO: 5.58 M/S
LV SEPTAL E/E' RATIO: 9.57 M/S
MV PEAK A VEL: 0.55 M/S
MV PEAK E VEL: 0.67 M/S
PISA MRMAX VEL: 0.05 M/S
PISA TR MAX VEL: 2.04 M/S
PV PEAK VELOCITY: 1.22 CM/S
RA MAJOR: 5.73 CM
RA PRESSURE: 3 MMHG
RA WIDTH: 2.9 CM
RIGHT VENTRICULAR END-DIASTOLIC DIMENSION: 3.87 CM
SINUS: 3.75 CM
STJ: 3.19 CM
TDI LATERAL: 0.12 M/S
TDI SEPTAL: 0.07 M/S
TDI: 0.1 M/S
TR MAX PG: 17 MMHG
TRICUSPID ANNULAR PLANE SYSTOLIC EXCURSION: 2.04 CM
TV REST PULMONARY ARTERY PRESSURE: 20 MMHG

## 2022-03-08 PROCEDURE — 93306 ECHO (CUPID ONLY): ICD-10-PCS | Mod: 26,,, | Performed by: INTERNAL MEDICINE

## 2022-03-08 PROCEDURE — 97110 THERAPEUTIC EXERCISES: CPT | Mod: PO,CQ

## 2022-03-08 PROCEDURE — 93306 TTE W/DOPPLER COMPLETE: CPT | Mod: 26,,, | Performed by: INTERNAL MEDICINE

## 2022-03-08 PROCEDURE — 93306 TTE W/DOPPLER COMPLETE: CPT

## 2022-03-08 NOTE — PROGRESS NOTES
"[facility]   Physical Therapy Treatment Note     Name: Thiago Thacker  Clinic Number: [id number]    Therapy Diagnosis:   Encounter Diagnoses   Name Primary?    Acute pain of right knee Yes    Muscle weakness of lower extremity     Decreased range of motion (ROM) of right knee      Physician: Traci Oconnell MD    Visit Date: 3/8/2022    Physician: Traci Oconnell MD     Physician Orders: PT Eval and Treat   Medical Diagnosis from Referral: Acute pain of right knee [M25.561]  Evaluation Date: 2/8/2022  Authorization Period Expiration: 12/31/22  Plan of Care Expiration: 5/8/22  Progress Note Due: 3/10/22  Visit # / Visits authorized: 5/20 + eval  FOTO: 1/3     Precautions: Standard     PTA Visit #: 1/5     Time In: 1120 am  Time Out: 1205 pm  Total Billable Time: 45 minutes    SUBJECTIVE     Pt reports:  Knee is getting better but still have pain in my R hamstring.   Response to previous treatment: felt great   Functional change: Ongoing    Pain: did not rate/10  Location: right knee      OBJECTIVE         Treatment     Thiago received the treatments listed below:      therapeutic exercises to develop strength, endurance, ROM and flexibility for 40 minutes including:    Upright bike 8' Level 2  Active HS stretch in supine X 20  Quad sets x 20 3"  Heels slides  SLR 3 x 10 1# L  SAQ 2 x 10 1# R  LAQ 2 x 10 R 1 #  Bridges 3 x 10 GTB  Clams GTB 2 x 10 B   SL hip abduction 2 x 10 B no weight today - pulling into hip flexion too great  Concentric/eccentric HS flex with GT X 30  Seated hip internal rotation/ER with GTB 3 x 10  Step down 1 inch step X 30  Hamstring stretch 2 x 60" B  Prone quad stretch 3 x 30" B  Tandem balance on foam 2 X 45 seconds   Hip flexor stretch off side of table 1'ea towel roll under buttock   TKE      Patient Education and Home Exercises     Home Exercises Provided and Patient Education Provided     Education provided:   -home exercise program reviewed    Written Home " Exercises Provided:    ASSESSMENT     Pt noted he still is having some pain in his R hamstring but overall the knee has been doing better.  Pt performed the above exercises with good tolerance requiring some minor cueing on proper knee alignment during eccentric step downs to prevent R knee valgus collapse.     Thiago Is progressing well towards his goals.   Pt prognosis is Good.     Pt will continue to benefit from skilled outpatient physical therapy to address the deficits listed in the problem list box on initial evaluation, provide pt/family education and to maximize pt's level of independence in the home and community environment.     Pt's spiritual, cultural and educational needs considered and pt agreeable to plan of care and goals.     Anticipated barriers to physical therapy: Scheduling.    Goals:     GOALS: Short Term Goals:  5 weeks  1.Report decreased R knee pain  < / =  4/10 at worst to increase tolerance for prolonged sitting  2. Increase knee ROM to 0 degrees extension in order to be able to perform ADLs without difficulty.  3. Increase strength by 1/3 MMT grade in BLE  to increase tolerance for ADL and work activities.  4. Pt to tolerate HEP to improve ROM and independence with ADL's     Long Term Goals: 10 weeks  1.Report decreased R knee pain < / = 2/10  to increase tolerance for running  2.Patient goal: Pt to be able to run on at least 4 days per week pain free to return to regular exercise  3.Increase strength to >/= 4+/5 in BLE  to increase tolerance for ADL and work activities.  4. Pt will report at CJ level (20-40% impaired) on FOTO knee to demonstrate increase in LE function with every day tasks.     PLAN     Cont. PT POC.     Vincent Turner, PTA

## 2022-03-09 ENCOUNTER — PATIENT MESSAGE (OUTPATIENT)
Dept: PRIMARY CARE CLINIC | Facility: CLINIC | Age: 72
End: 2022-03-09
Payer: MEDICARE

## 2022-03-09 DIAGNOSIS — R01.1 MURMUR, CARDIAC: Primary | ICD-10-CM

## 2022-03-10 ENCOUNTER — CLINICAL SUPPORT (OUTPATIENT)
Dept: REHABILITATION | Facility: HOSPITAL | Age: 72
End: 2022-03-10
Attending: FAMILY MEDICINE
Payer: MEDICARE

## 2022-03-10 DIAGNOSIS — M25.661 DECREASED RANGE OF MOTION (ROM) OF RIGHT KNEE: ICD-10-CM

## 2022-03-10 DIAGNOSIS — M25.561 ACUTE PAIN OF RIGHT KNEE: Primary | ICD-10-CM

## 2022-03-10 DIAGNOSIS — M62.81 MUSCLE WEAKNESS OF LOWER EXTREMITY: ICD-10-CM

## 2022-03-10 PROCEDURE — 97110 THERAPEUTIC EXERCISES: CPT | Mod: PO

## 2022-03-10 NOTE — PROGRESS NOTES
[facility]   Physical Therapy Treatment Note / Reassessment    Name: Thiago Thacker  Clinic Number: [id number]    Therapy Diagnosis:   Encounter Diagnoses   Name Primary?    Acute pain of right knee Yes    Muscle weakness of lower extremity     Decreased range of motion (ROM) of right knee      Physician: Traci Oconnell MD    Visit Date: 3/10/2022    Physician: Traci Oconnell MD     Physician Orders: PT Eval and Treat   Medical Diagnosis from Referral: Acute pain of right knee [M25.561]  Evaluation Date: 2/8/2022  Authorization Period Expiration: 12/31/22  Plan of Care Expiration: 5/8/22  Progress Note Due: 4/10/22  Visit # / Visits authorized: 5/20 + eval  FOTO: 2/3     Precautions: Standard     PTA Visit #: 0/5     Time In: 10:15 am  Time Out: 11:00 am  Total Billable Time: 45 minutes    SUBJECTIVE     Pt reports:  Knee is getting better but still have pain in my R hamstring. Knee pain is increased with weight bearing and with crossing legs while sitting.   Response to previous treatment: felt great   Functional change: Ongoing    Pain: 2/10  Location: right knee      OBJECTIVE     Observation: Pt does not achieve hip extension during gait. He also demonstrates mild hip drop on RLE, and increased rotation through lumbar spine likely to achieve forward limb advancement.      Functional tests:   SL squat: NT  SLS EO: NT  SLS EC: NT     Range of Motion (Active):   Knee Right  Left    Flexion 128 130   Extension Lacking 9 with soreness 0         Lower Extremity Strength  Right LE   Left LE     Quadriceps: 4/5 with pain Quadriceps: 5/5   Hamstrings: 4/5 Hamstrings: 4+/5   Hip flexion (seated): 4/5 Hip flexion (seated): 4+/5   Hip extension:  4-/5 Hip extension: 4-/5   PGM: 4-/5 PGM:  4-/5   Hip ABD:  4-/5 Hip ABD:  4+/5      Special Tests:    Right Left   Beltran's Test + -   Thessaly's Test + medial -   Patellar Grind Test + +      Joint Mobility: normal patella mobility in all planes  "bilaterally      Palpation: tenderness at medial joint line R, tenderness at ATFL. Hypertonicity in hamstrings     Sensation: intact to light touch     Flexibility:               Hamstrings: R = mod limitation ; L = mod limitation               Esther's test: R = no limitation ; L = no limitation              Dylan test: R = pos ; L = pos     Edema: NT but visible swelling inferior to patella              Limitation/Restriction for FOTO knee Survey     Therapist reviewed FOTO scores for Thiago Thacker on 3/10/2022.   FOTO documents entered into [employer] - see Media section.     Limitation Score: 44%             Treatment     Thiago received the treatments listed below:      therapeutic exercises to develop strength, endurance, ROM and flexibility for 40 minutes including:  Objective measures taken    Upright bike 8' Level 2  Ankle pumps on swiss ball for sciatic nerve x30  SKTC on swiss ball for sciatic nerve x20  Supine sciatic nerve glide x15   Active HS stretch in supine X 20  Quad sets x 20 3"  Heels slides  SLR 3 x 10 1# L  SAQ 2 x 10 1# R  LAQ 2 x 10 R 1 #  Bridges 3 x 10 GTB  Clams GTB 2 x 10 B   SL hip abduction 2 x 10 B no weight today - pulling into hip flexion too great  Concentric/eccentric HS flex with GTB X 30  Seated hip internal rotation/ER with GTB 3 x 10  Step down 1 inch step X 30  Hamstring stretch 2 x 60" B  Prone quad stretch 3 x 30" B  Tandem balance on foam 2 X 45 seconds   Hip flexor stretch off side of table 1'ea towel roll under buttock   TKE      Patient Education and Home Exercises     Home Exercises Provided and Patient Education Provided     Education provided:   -home exercise program reviewed    Written Home Exercises Provided:    ASSESSMENT     Upon reassessment today, pt with continued deficits in knee extension ROM while knee flexion ROM has improved slightly. PT suspects neural tone is contributing to patient complaints of hamstring tightness. Pt was instructed to perform all variations of " nerve glides for his HEP. Exercises will be added to HEP in the future. Continue to progress as tolerated.       Thiago Is progressing well towards his goals.   Pt prognosis is Good.     Pt will continue to benefit from skilled outpatient physical therapy to address the deficits listed in the problem list box on initial evaluation, provide pt/family education and to maximize pt's level of independence in the home and community environment.     Pt's spiritual, cultural and educational needs considered and pt agreeable to plan of care and goals.     Anticipated barriers to physical therapy: Scheduling.    Goals:     GOALS: Short Term Goals:  5 weeks (not met, progressing)  1.Report decreased R knee pain  < / =  4/10 at worst to increase tolerance for prolonged sitting MET  2. Increase knee ROM to 0 degrees extension in order to be able to perform ADLs without difficulty.  3. Increase strength by 1/3 MMT grade in BLE  to increase tolerance for ADL and work activities. MET  4. Pt to tolerate HEP to improve ROM and independence with ADL's      Long Term Goals: 10 weeks (not met, progressing)  1.Report decreased R knee pain < / = 2/10  to increase tolerance for running MET  2.Patient goal: Pt to be able to run on at least 4 days per week pain free to return to regular exercise   3.Increase strength to >/= 4+/5 in BLE  to increase tolerance for ADL and work activities.  4. Pt will report at CJ level (20-40% impaired) on FOTO knee to demonstrate increase in LE function with every day tasks.     PLAN     Cont. PT POC.     Kassi Chang, PT

## 2022-03-10 NOTE — PROGRESS NOTES
Subjective:       Patient ID: Thiago Thacker is a 72 y.o. male.    Chief Complaint: Cataract     HPI     Referred by Dr. Medina    Patient states he has been c/o horizontal double vision x 2 months.   Notices the doubling mainly when driving more than when doing other   activities such as watching TV. Patient states he also has problems with   glare at night.     Moved recently from [place]. He is a PhD Neuropsychologist. Father had   NVAMD. Nonsmoker. Trouble with night time driving and glare worse in the   left eye.     Eye meds:  Systane PRN OU  AT's kailyn QHS OU    Last edited by Carlyn Addison MD on 3/11/2022 11:55 AM. (History)              Assessment & Plan   Combined forms of age-related cataract of both eyes    Exophoria of both eyes    Corneal dellen of left eye    Combined forms of age-related cataract OU  Elect to continue to correct with glasses and continue to monitor.   Visually significant and interfering with activities of daily living including driving/reading  Pt wants to think about it  RTC 4 months to revisit      Significant disparity in AL  Repeat IOL calculations prior to surgery      High myopia OU  Cont glasses     Alternating exophoria   Mild  Distance only  Refer to Dr. Ham for prism evaluation    PLAN  Refer to Dr. Ham for prism evaluation    RTC 4 months discuss CE and repeat IOL calculations, NO DFE    Carlyn Addison M.D., M.S.  Department of Ophthalmology   Division of Glaucoma Surgery  [facility]

## 2022-03-11 ENCOUNTER — OFFICE VISIT (OUTPATIENT)
Dept: OPHTHALMOLOGY | Facility: CLINIC | Age: 72
End: 2022-03-11
Payer: MEDICARE

## 2022-03-11 DIAGNOSIS — H18.49 CORNEAL DELLEN OF LEFT EYE: ICD-10-CM

## 2022-03-11 DIAGNOSIS — H25.813 COMBINED FORMS OF AGE-RELATED CATARACT OF BOTH EYES: Primary | ICD-10-CM

## 2022-03-11 DIAGNOSIS — H50.52 EXOPHORIA OF BOTH EYES: ICD-10-CM

## 2022-03-11 PROCEDURE — 99999 PR PBB SHADOW E&M-EST. PATIENT-LVL III: ICD-10-PCS | Mod: PBBFAC,,, | Performed by: STUDENT IN AN ORGANIZED HEALTH CARE EDUCATION/TRAINING PROGRAM

## 2022-03-11 PROCEDURE — 99213 OFFICE O/P EST LOW 20 MIN: CPT | Mod: PBBFAC | Performed by: STUDENT IN AN ORGANIZED HEALTH CARE EDUCATION/TRAINING PROGRAM

## 2022-03-11 PROCEDURE — 99204 OFFICE O/P NEW MOD 45 MIN: CPT | Mod: S$GLB,,, | Performed by: STUDENT IN AN ORGANIZED HEALTH CARE EDUCATION/TRAINING PROGRAM

## 2022-03-11 PROCEDURE — 99999 PR PBB SHADOW E&M-EST. PATIENT-LVL III: CPT | Mod: PBBFAC,,, | Performed by: STUDENT IN AN ORGANIZED HEALTH CARE EDUCATION/TRAINING PROGRAM

## 2022-03-11 PROCEDURE — 99204 PR OFFICE/OUTPT VISIT, NEW, LEVL IV, 45-59 MIN: ICD-10-PCS | Mod: S$GLB,,, | Performed by: STUDENT IN AN ORGANIZED HEALTH CARE EDUCATION/TRAINING PROGRAM

## 2022-03-15 ENCOUNTER — CLINICAL SUPPORT (OUTPATIENT)
Dept: REHABILITATION | Facility: HOSPITAL | Age: 72
End: 2022-03-15
Attending: FAMILY MEDICINE
Payer: COMMERCIAL

## 2022-03-15 DIAGNOSIS — M62.81 MUSCLE WEAKNESS OF LOWER EXTREMITY: ICD-10-CM

## 2022-03-15 DIAGNOSIS — M25.561 ACUTE PAIN OF RIGHT KNEE: Primary | ICD-10-CM

## 2022-03-15 DIAGNOSIS — M25.661 DECREASED RANGE OF MOTION (ROM) OF RIGHT KNEE: ICD-10-CM

## 2022-03-15 PROCEDURE — 97110 THERAPEUTIC EXERCISES: CPT | Mod: PO | Performed by: PHYSICAL THERAPIST

## 2022-03-15 NOTE — PROGRESS NOTES
[facility]   Physical Therapy Treatment Note / Reassessment    Name: Thiago Thacker  Clinic Number: [id number]    Therapy Diagnosis:   Encounter Diagnoses   Name Primary?    Acute pain of right knee Yes    Muscle weakness of lower extremity     Decreased range of motion (ROM) of right knee      Physician: Traci Oconnell MD    Visit Date: 3/15/2022    Physician: Traci Oconnell MD     Physician Orders: PT Eval and Treat   Medical Diagnosis from Referral: Acute pain of right knee [M25.561]  Evaluation Date: 2/8/2022  Authorization Period Expiration: 12/31/22  Plan of Care Expiration: 5/8/22  Progress Note Due: 4/10/22  Visit # / Visits authorized: 5/20 + eval  FOTO: 2/3     Precautions: Standard     PTA Visit #: 0/5     Time In: 1053 am  Time Out: 1135  am  Total Billable Time: 42  minutes    SUBJECTIVE     Pt reports:  The right knee is not hurting now but he has pain associated with increased weight bearing on the right leg. He says the hamstrings are also painful in the RLE especially when siting or driving. He says he also has back problems and was diagnosed with spondylolisthesis many years ago while in [place].    Response to previous treatment: felt great   Functional change: Ongoing    Pain: 3 /10, intermittent   Location: right knee      OBJECTIVE     Observation: Pt does not achieve hip extension during gait. He also demonstrates mild hip drop on RLE, and increased rotation through lumbar spine likely to achieve forward limb advancement.      Functional tests:   SL squat: NT  SLS EO: NT  SLS EC: NT     Range of Motion (Active):   Knee Right  Left    Flexion 128 130   Extension Lacking 9 with soreness 0         Lower Extremity Strength  Right LE   Left LE     Quadriceps: 4/5 with pain Quadriceps: 5/5   Hamstrings: 4/5 Hamstrings: 4+/5   Hip flexion (seated): 4/5 Hip flexion (seated): 4+/5   Hip extension:  4-/5 Hip extension: 4-/5   PGM: 4-/5 PGM:  4-/5   Hip ABD:  4-/5 Hip ABD:   "4+/5      Special Tests:    Right Left   Beltran's Test + -   Thessaly's Test + medial -   Patellar Grind Test + +      Joint Mobility: normal patella mobility in all planes bilaterally      Palpation: tenderness at medial joint line R, tenderness at ATFL. Hypertonicity in hamstrings     Sensation: intact to light touch     Flexibility:               Hamstrings: R = mod limitation ; L = mod limitation               Esther's test: R = no limitation ; L = no limitation              Dylan test: R = pos ; L = pos     Edema: NT but visible swelling inferior to patella              Limitation/Restriction for FOTO knee Survey     Therapist reviewed FOTO scores for Thiago Thacker on 3/10/2022.   FOTO documents entered into [employer] - see Media section.     Limitation Score: 44%             Treatment     Thiago received the treatments listed below:      therapeutic exercises to develop strength, endurance, ROM and flexibility for 40 minutes including:  Objective measures taken    Upright bike 7' Level 2  Ankle pumps on swiss ball for sciatic nerve x30  SKTC on swiss ball for sciatic nerve x20  Side lying  sciatic nerve glide x15   HS stretch in long sit 10" hold x 12 w/ankle pumps for flossing 1'   Quad sets x 20 3"  Heels slides  SLR 5  x 10 3 # R   SAQ 2 x 10 1# R  LAQ 2 x 10 R 3 #  Bridges 4 x 10 GTB  SL bridge on heel  x30   Clams GTB 3 x 10 B   SL hip abduction 2 x 10 B no weight today - pulling into hip flexion too great  Standing  Concentric/eccentric HS flex with 3# X 30  Seated hip internal rotation/ER with GTB 3 x 10  Step down 1 inch step X 30  Hamstring stretch 2 x 60" B  Prone quad stretch 3 x 30" B  Tandem balance on foam 2 X 45 seconds   Hip flexor stretch off side of table 1'ea towel roll under buttock   TKE      Patient Education and Home Exercises     Home Exercises Provided and Patient Education Provided     Education provided:   -home exercise program reviewed    Written Home Exercises Provided:      Provide update " next session     ASSESSMENT     Routine performed as noted. He did not demonstrate any signs of increased pain either in the knee or the RLE. The discomfort he encounters in the posterior thigh is due to hamstring and gastroc tightness. This was most evident with long sit HS/Achilles stretch. He also reported that he has had a back issue for sometime (spondylolisthesis) which he is bothered by from time to time. Overall responded well to the session.       Thiago Is progressing well towards his goals.   Pt prognosis is Good.     Pt will continue to benefit from skilled outpatient physical therapy to address the deficits listed in the problem list box on initial evaluation, provide pt/family education and to maximize pt's level of independence in the home and community environment.     Pt's spiritual, cultural and educational needs considered and pt agreeable to plan of care and goals.     Anticipated barriers to physical therapy: Scheduling.    Goals:     GOALS: Short Term Goals:  5 weeks (not met, progressing)  1.Report decreased R knee pain  < / =  4/10 at worst to increase tolerance for prolonged sitting MET  2. Increase knee ROM to 0 degrees extension in order to be able to perform ADLs without difficulty.  3. Increase strength by 1/3 MMT grade in BLE  to increase tolerance for ADL and work activities. MET  4. Pt to tolerate HEP to improve ROM and independence with ADL's      Long Term Goals: 10 weeks (not met, progressing)  1.Report decreased R knee pain < / = 2/10  to increase tolerance for running MET  2.Patient goal: Pt to be able to run on at least 4 days per week pain free to return to regular exercise   3.Increase strength to >/= 4+/5 in BLE  to increase tolerance for ADL and work activities.  4. Pt will report at CJ level (20-40% impaired) on FOTO knee to demonstrate increase in LE function with every day tasks.     PLAN     Cont. PT POC.     Skyler Roth, PT

## 2022-03-17 ENCOUNTER — CLINICAL SUPPORT (OUTPATIENT)
Dept: REHABILITATION | Facility: HOSPITAL | Age: 72
End: 2022-03-17
Attending: FAMILY MEDICINE
Payer: MEDICARE

## 2022-03-17 DIAGNOSIS — M62.81 MUSCLE WEAKNESS OF LOWER EXTREMITY: ICD-10-CM

## 2022-03-17 DIAGNOSIS — M25.661 DECREASED RANGE OF MOTION (ROM) OF RIGHT KNEE: ICD-10-CM

## 2022-03-17 DIAGNOSIS — M25.561 ACUTE PAIN OF RIGHT KNEE: Primary | ICD-10-CM

## 2022-03-17 PROCEDURE — 97110 THERAPEUTIC EXERCISES: CPT | Mod: PO

## 2022-03-17 NOTE — PROGRESS NOTES
"[facility]   Physical Therapy Treatment Note    Name: Thiago Thacker  Clinic Number: [id number]    Therapy Diagnosis:   Encounter Diagnoses   Name Primary?    Acute pain of right knee Yes    Muscle weakness of lower extremity     Decreased range of motion (ROM) of right knee      Physician: Traci Oconnell MD    Visit Date: 3/17/2022    Physician: Traci Oconnell MD     Physician Orders: PT Eval and Treat   Medical Diagnosis from Referral: Acute pain of right knee [M25.561]  Evaluation Date: 2/8/2022  Authorization Period Expiration: 12/31/22  Plan of Care Expiration: 5/8/22  Progress Note Due: 4/10/22  Visit # / Visits authorized: 5/20 + eval  FOTO: 2/3     Precautions: Standard     PTA Visit #: 0/5     Time In: 11:06 am  Time Out: 11:45  am  Total Billable Time: 39  minutes    SUBJECTIVE     Pt reports: he is still having electric pain in the hamstring. As long as he doesn't put weight on the knee he doesn't notice it.   Response to previous treatment: felt great   Functional change: Ongoing    Pain: 3 /10, intermittent   Location: right knee      OBJECTIVE     Objective measures taken at progress report unless specified otherwise.    Treatment     Thiago received the treatments listed below:      therapeutic exercises to develop strength, endurance, ROM and flexibility for 39 minutes including:    Upright bike 8' Level 2  Ankle pumps on swiss ball for sciatic nerve x30  SKTC on swiss ball for sciatic nerve x20  Side lying  sciatic nerve glide x15   HS stretch in long sit 10" hold x 12 w/ankle pumps for flossing 1'   Quad sets x 20 3"  Heels slides  SLR 5  x 10 3 # R   SAQ 2 x 10 1# R  LAQ 2 x 10 R 3 #  Bridges 4 x 10 GTB  SL bridge on heel  x30   Clams GTB 3 x 10 B   SL hip abduction 2 x 10 B no weight today - pulling into hip flexion too great  Standing  Concentric/eccentric HS flex with 3# X 30  Seated hip internal rotation/ER with GTB 3 x 10  Step down 1 inch step X 30  Hamstring " "stretch 2 x 60" B  Prone quad stretch 3 x 30" B  Tandem balance on foam 2 X 45 seconds   Hip flexor stretch off side of table 1'ea towel roll under buttock   TKE  +seated swiss ball roll outs x20      Patient Education and Home Exercises     Home Exercises Provided and Patient Education Provided     Education provided:   -home exercise program reviewed    Written Home Exercises Provided:      Provide update next session     ASSESSMENT     Pt with continued reports of hamstring tightness so focused session on nerve gliding and spinal mobility to reduce any present neural tension. Continue to progress as tolerated.       Thiago Is progressing well towards his goals.   Pt prognosis is Good.     Pt will continue to benefit from skilled outpatient physical therapy to address the deficits listed in the problem list box on initial evaluation, provide pt/family education and to maximize pt's level of independence in the home and community environment.     Pt's spiritual, cultural and educational needs considered and pt agreeable to plan of care and goals.     Anticipated barriers to physical therapy: Scheduling.    Goals:     GOALS: Short Term Goals:  5 weeks (not met, progressing)  1.Report decreased R knee pain  < / =  4/10 at worst to increase tolerance for prolonged sitting MET  2. Increase knee ROM to 0 degrees extension in order to be able to perform ADLs without difficulty.  3. Increase strength by 1/3 MMT grade in BLE  to increase tolerance for ADL and work activities. MET  4. Pt to tolerate HEP to improve ROM and independence with ADL's      Long Term Goals: 10 weeks (not met, progressing)  1.Report decreased R knee pain < / = 2/10  to increase tolerance for running MET  2.Patient goal: Pt to be able to run on at least 4 days per week pain free to return to regular exercise   3.Increase strength to >/= 4+/5 in BLE  to increase tolerance for ADL and work activities.  4. Pt will report at CJ level (20-40% impaired) on " FOTO knee to demonstrate increase in LE function with every day tasks.     PLAN     Cont. PT POC.     Kassi Chang, PT

## 2022-03-22 ENCOUNTER — CLINICAL SUPPORT (OUTPATIENT)
Dept: REHABILITATION | Facility: HOSPITAL | Age: 72
End: 2022-03-22
Attending: FAMILY MEDICINE
Payer: COMMERCIAL

## 2022-03-22 DIAGNOSIS — M62.81 MUSCLE WEAKNESS OF LOWER EXTREMITY: ICD-10-CM

## 2022-03-22 DIAGNOSIS — M25.561 ACUTE PAIN OF RIGHT KNEE: Primary | ICD-10-CM

## 2022-03-22 DIAGNOSIS — M25.661 DECREASED RANGE OF MOTION (ROM) OF RIGHT KNEE: ICD-10-CM

## 2022-03-22 PROCEDURE — 97110 THERAPEUTIC EXERCISES: CPT | Mod: PO

## 2022-03-22 PROCEDURE — 97140 MANUAL THERAPY 1/> REGIONS: CPT | Mod: PO

## 2022-03-22 NOTE — PROGRESS NOTES
"[facility]   Physical Therapy Treatment Note    Name: Thiago Thacker  Clinic Number: [id number]    Therapy Diagnosis:   Encounter Diagnoses   Name Primary?    Acute pain of right knee Yes    Muscle weakness of lower extremity     Decreased range of motion (ROM) of right knee      Physician: Traci Oconnell MD    Visit Date: 3/22/2022    Physician: Traci Oconnell MD     Physician Orders: PT Eval and Treat   Medical Diagnosis from Referral: Acute pain of right knee [M25.561]  Evaluation Date: 2/8/2022  Authorization Period Expiration: 12/31/22  Plan of Care Expiration: 5/8/22  Progress Note Due: 4/10/22  Visit # / Visits authorized: 9/20 + eval  FOTO: 2/3     Precautions: Standard     PTA Visit #: 0/5     Time In: 10:02 am  Time Out: 10:45 am  Total Billable Time: 43 minutes    SUBJECTIVE     Pt reports: he is very stiff in the morning when he wakes up and has difficulty stretching    Response to previous treatment: felt great   Functional change: Ongoing    Pain: 2/10, intermittent   Location: right knee      OBJECTIVE     Objective measures taken at progress report unless specified otherwise.    Treatment     Thiago received the treatments listed below:      therapeutic exercises to develop strength, endurance, ROM and flexibility for 35 minutes including:    Upright bike 8' Level 3  Ankle pumps on swiss ball for sciatic nerve x30 B  SKTC on swiss ball for sciatic nerve x20 B  seated sciatic nerve glide x15   HS stretch in long sit 10" hold x 12 w/ankle pumps for flossing 1'   +seated upper trunk rotations x15 B (head stays forward)  +thoracic rotation in quadruped (thread the needle) x15 B  Quad sets x 20 3"  Heels slides  SLR 5  x 10 3 # R   SAQ 2 x 10 1# R  LAQ 2 x 10 R 3 #  Bridges 4 x 10 GTB  SL bridge on heel  x30   Clams GTB 3 x 10 B   SL hip abduction 2 x 10 B no weight today - pulling into hip flexion too great  Standing  Concentric/eccentric HS flex with 3# X 30  Seated hip " "internal rotation/ER with GTB 3 x 10  Step down 1 inch step X 30  Hamstring stretch 2 x 60" B  Prone quad stretch 3 x 30" B  Tandem balance on foam 2 X 45 seconds   Hip flexor stretch off side of table 1'ea towel roll under buttock   TKE  +seated swiss ball roll outs x20    Manual therapy x8 minutes   T5-12, L1-L5, sacrum  R innominate posterior rotational mobs      Patient Education and Home Exercises     Home Exercises Provided and Patient Education Provided     Education provided:   -home exercise program reviewed    Written Home Exercises Provided:      Provide update next session     ASSESSMENT     Pt with good response to manual and exercises today and reporting less tightness in the hamstrings at end of session. Added several new spinal mobility exercises today. Continue to progress.       Thiago Is progressing well towards his goals.   Pt prognosis is Good.     Pt will continue to benefit from skilled outpatient physical therapy to address the deficits listed in the problem list box on initial evaluation, provide pt/family education and to maximize pt's level of independence in the home and community environment.     Pt's spiritual, cultural and educational needs considered and pt agreeable to plan of care and goals.     Anticipated barriers to physical therapy: Scheduling.    Goals:     GOALS: Short Term Goals:  5 weeks (not met, progressing)  1.Report decreased R knee pain  < / =  4/10 at worst to increase tolerance for prolonged sitting MET  2. Increase knee ROM to 0 degrees extension in order to be able to perform ADLs without difficulty.  3. Increase strength by 1/3 MMT grade in BLE  to increase tolerance for ADL and work activities. MET  4. Pt to tolerate HEP to improve ROM and independence with ADL's      Long Term Goals: 10 weeks (not met, progressing)  1.Report decreased R knee pain < / = 2/10  to increase tolerance for running MET  2.Patient goal: Pt to be able to run on at least 4 days per week " pain free to return to regular exercise   3.Increase strength to >/= 4+/5 in BLE  to increase tolerance for ADL and work activities.  4. Pt will report at CJ level (20-40% impaired) on FOTO knee to demonstrate increase in LE function with every day tasks.     PLAN     Cont. PT POC.     Kassi Chang, PT

## 2022-03-24 ENCOUNTER — CLINICAL SUPPORT (OUTPATIENT)
Dept: REHABILITATION | Facility: HOSPITAL | Age: 72
End: 2022-03-24
Attending: FAMILY MEDICINE
Payer: MEDICARE

## 2022-03-24 DIAGNOSIS — M25.661 DECREASED RANGE OF MOTION (ROM) OF RIGHT KNEE: ICD-10-CM

## 2022-03-24 DIAGNOSIS — M25.561 ACUTE PAIN OF RIGHT KNEE: Primary | ICD-10-CM

## 2022-03-24 DIAGNOSIS — M62.81 MUSCLE WEAKNESS OF LOWER EXTREMITY: ICD-10-CM

## 2022-03-24 PROCEDURE — 97110 THERAPEUTIC EXERCISES: CPT | Mod: PO

## 2022-03-24 NOTE — PROGRESS NOTES
"[facility]   Physical Therapy Treatment Note    Name: Thiago Thacker  Clinic Number: [id number]    Therapy Diagnosis:   Encounter Diagnoses   Name Primary?    Acute pain of right knee Yes    Muscle weakness of lower extremity     Decreased range of motion (ROM) of right knee      Physician: Traci Oconnell MD    Visit Date: 3/24/2022    Physician: Traci Oconnell MD     Physician Orders: PT Eval and Treat   Medical Diagnosis from Referral: Acute pain of right knee [M25.561]  Evaluation Date: 2/8/2022  Authorization Period Expiration: 12/31/22  Plan of Care Expiration: 5/8/22  Progress Note Due: 4/10/22  Visit # / Visits authorized: 9/20 + eval  FOTO: 2/3     Precautions: Standard     PTA Visit #: 0/5     Time In: 10:15 am  Time Out: 10:54 am  Total Billable Time: 39 minutes    SUBJECTIVE     Pt reports: he is very stiff in the morning when he wakes up and has difficulty stretching    Response to previous treatment: felt great   Functional change: Ongoing    Pain: 2/10, intermittent   Location: right knee      OBJECTIVE     Objective measures taken at progress report unless specified otherwise.    Treatment     Thiago received the treatments listed below:      therapeutic exercises to develop strength, endurance, ROM and flexibility for 39 minutes including:    Upright bike 8' Level 3  Ankle pumps on swiss ball for sciatic nerve x30 B  SKTC on swiss ball for sciatic nerve x20 B  seated sciatic nerve glide x20  HS stretch in long sit 10" hold x 12 w/ankle pumps for flossing 1'   seated upper trunk rotations x15 B (head stays forward)  thoracic rotation in quadruped (thread the needle) x15 B  +calf stretch on incline level 3 3x30" B   +leg press 40# 3x30  Quad sets x 20 3"  Heels slides  SLR 5  x 10 3 # R   SAQ 2 x 10 1# R  LAQ 2 x 10 R 3 #  Bridges 4 x 10  SL bridge on heel  x30   Clams GTB 3 x 10 B   SL hip abduction 2 x 10 B no weight today - pulling into hip flexion too great  Standing  " "Concentric/eccentric HS flex with 3# X 30  Seated hip internal rotation/ER with GTB 3 x 10  Step down 1 inch step X 30  Hamstring stretch 2 x 60" B  Prone quad stretch 3 x 30" B  Tandem balance on foam 2 X 45 seconds   Hip flexor stretch off side of table 1'ea towel roll under buttock   TKE  +seated swiss ball roll outs x20    Manual therapy x0 minutes   T5-12, L1-L5, sacrum  R innominate posterior rotational mobs      Patient Education and Home Exercises     Home Exercises Provided and Patient Education Provided     Education provided:   -home exercise program reviewed    Written Home Exercises Provided:      Provide update next session     ASSESSMENT     Pt with good tolerance to exercises performed today with the exception of prone hip extension which causes low back pain. He will continue to benefit from skilled PT for strength and to address neural tension, but begin to prepare for discharge in the next 5-6 visits.       Thiago Is progressing well towards his goals.   Pt prognosis is Good.     Pt will continue to benefit from skilled outpatient physical therapy to address the deficits listed in the problem list box on initial evaluation, provide pt/family education and to maximize pt's level of independence in the home and community environment.     Pt's spiritual, cultural and educational needs considered and pt agreeable to plan of care and goals.     Anticipated barriers to physical therapy: Scheduling.    Goals:     GOALS: Short Term Goals:  5 weeks (not met, progressing)  1.Report decreased R knee pain  < / =  4/10 at worst to increase tolerance for prolonged sitting MET  2. Increase knee ROM to 0 degrees extension in order to be able to perform ADLs without difficulty.  3. Increase strength by 1/3 MMT grade in BLE  to increase tolerance for ADL and work activities. MET  4. Pt to tolerate HEP to improve ROM and independence with ADL's      Long Term Goals: 10 weeks (not met, progressing)  1.Report " decreased R knee pain < / = 2/10  to increase tolerance for running MET  2.Patient goal: Pt to be able to run on at least 4 days per week pain free to return to regular exercise   3.Increase strength to >/= 4+/5 in BLE  to increase tolerance for ADL and work activities.  4. Pt will report at CJ level (20-40% impaired) on FOTO knee to demonstrate increase in LE function with every day tasks.     PLAN     Cont. PT POC.     Kassi Chang, PT

## 2022-03-29 ENCOUNTER — CLINICAL SUPPORT (OUTPATIENT)
Dept: REHABILITATION | Facility: HOSPITAL | Age: 72
End: 2022-03-29
Attending: FAMILY MEDICINE
Payer: COMMERCIAL

## 2022-03-29 DIAGNOSIS — M25.561 ACUTE PAIN OF RIGHT KNEE: Primary | ICD-10-CM

## 2022-03-29 DIAGNOSIS — M25.661 DECREASED RANGE OF MOTION (ROM) OF RIGHT KNEE: ICD-10-CM

## 2022-03-29 DIAGNOSIS — M62.81 MUSCLE WEAKNESS OF LOWER EXTREMITY: ICD-10-CM

## 2022-03-29 PROCEDURE — 97110 THERAPEUTIC EXERCISES: CPT | Mod: PO | Performed by: PHYSICAL THERAPIST

## 2022-03-29 NOTE — PROGRESS NOTES
"[facility]   Physical Therapy Treatment Note    Name: Thiago Thacker  Clinic Number: [id number]    Therapy Diagnosis:   Encounter Diagnoses   Name Primary?    Acute pain of right knee Yes    Muscle weakness of lower extremity     Decreased range of motion (ROM) of right knee      Physician: Traci Oconnell MD    Visit Date: 3/29/2022    Physician: Traci Oconnell MD     Physician Orders: PT Eval and Treat   Medical Diagnosis from Referral: Acute pain of right knee [M25.561]  Evaluation Date: 2/8/2022  Authorization Period Expiration: 12/31/22  Plan of Care Expiration: 5/8/22  Progress Note Due: 4/10/22  Visit # / Visits authorized: 11/20 + eval  FOTO: 2/3     Precautions: Standard     PTA Visit #: 0/5     Time In: 1135 am  Time Out: 1225  am  Total Billable Time: 50 minutes    SUBJECTIVE     Pt reports: the knee is feeling fine. Says he did something to his low back. He says he exercises every day and that it was a sudden onset but not sure what he was doing at the time. Hurts to bend over.   Response to previous treatment: felt great   Functional change: Ongoing    Pain: 0 /10, intermittent   Location: right knee      OBJECTIVE     Objective measures taken at progress report unless specified otherwise.    Treatment     Thiago received the treatments listed below:      therapeutic exercises to develop strength, endurance, ROM and flexibility for 45 minutes including:    Upright bike 8' Level 3    HS stretch seated hold 10" x 9   Ankle pumps seated upright  sciatic nerve 1 min   Ankle pumps seated slouched sciatic nerve 1 min   SKTC on swiss ball for sciatic nerve x20 B    seated thoracic extension  / rotations x10  thoracic rotation in quadruped (thread the needle) x15 B  +calf stretch on incline level 3 3x30" B   +leg press 40# 3x30  Quad sets x 20 3"  Heels slides  SLR 3 x 10 5 # R   SAQ 4 x 10 5# R  LAQ 3 x 10 R 5 # reciprocal inhibition kick up / pull back   LAQ 3x10 5# emphasis on " eccentric.   Bridges 4 x 10  SL bridge on heel  x30   Clams GTB 3 x 10 B   SL hip abduction 3  x 10 5#  Standing  Concentric/eccentric HS flex with 5# X 30  Seated hip internal rotation/ER with GTB 3 x 10  Step down 1 inch step X 30    Tandem balance on foam 2 X 45 seconds   Hip flexor stretch off side of table 1'ea towel roll under buttock   TKE  +seated swiss ball roll outs x20    Manual therapy x0 minutes   T5-12, L1-L5, sacrum  R innominate posterior rotational mobs      Patient Education and Home Exercises     Home Exercises Provided and Patient Education Provided     Education provided: differentiate between muscular and nerve pain confined to the posterior thigh.  Educated to importance of not trying to experiment with various exercises in an attempt to reduce the hamstring pain when he is not certain if muscular or nerve.   -home exercise program reviewed    Written Home Exercises Provided:      Provide update next session     ASSESSMENT      The patient performed the routine as noted. He completed all the exercises without interruption. Requires cueing to stay on course with the exercise performed as he occasionally deviates. He did not report pain at the end of the session in the hamstring, back or knee. Back and hamstring likely muscular.  Sciatic nerve pain does not present itself during hamstring stretches or flossing activities but is confined just to the hamstring.       Thiago Is progressing well towards his goals.   Pt prognosis is Good.     Pt will continue to benefit from skilled outpatient physical therapy to address the deficits listed in the problem list box on initial evaluation, provide pt/family education and to maximize pt's level of independence in the home and community environment.     Pt's spiritual, cultural and educational needs considered and pt agreeable to plan of care and goals.     Anticipated barriers to physical therapy: Scheduling.    Goals:     GOALS: Short Term Goals:  5  weeks (not met, progressing)  1.Report decreased R knee pain  < / =  4/10 at worst to increase tolerance for prolonged sitting MET  2. Increase knee ROM to 0 degrees extension in order to be able to perform ADLs without difficulty.  3. Increase strength by 1/3 MMT grade in BLE  to increase tolerance for ADL and work activities. MET  4. Pt to tolerate HEP to improve ROM and independence with ADL's      Long Term Goals: 10 weeks (not met, progressing)  1.Report decreased R knee pain < / = 2/10  to increase tolerance for running MET  2.Patient goal: Pt to be able to run on at least 4 days per week pain free to return to regular exercise   3.Increase strength to >/= 4+/5 in BLE  to increase tolerance for ADL and work activities.  4. Pt will report at CJ level (20-40% impaired) on FOTO knee to demonstrate increase in LE function with every day tasks.     PLAN     Cont. PT POC.     Skyler Roth, PT

## 2022-03-31 ENCOUNTER — CLINICAL SUPPORT (OUTPATIENT)
Dept: REHABILITATION | Facility: HOSPITAL | Age: 72
End: 2022-03-31
Payer: COMMERCIAL

## 2022-03-31 DIAGNOSIS — M25.661 DECREASED RANGE OF MOTION (ROM) OF RIGHT KNEE: ICD-10-CM

## 2022-03-31 DIAGNOSIS — M62.81 MUSCLE WEAKNESS OF LOWER EXTREMITY: ICD-10-CM

## 2022-03-31 DIAGNOSIS — M25.561 ACUTE PAIN OF RIGHT KNEE: Primary | ICD-10-CM

## 2022-03-31 PROCEDURE — 97110 THERAPEUTIC EXERCISES: CPT | Mod: PO

## 2022-03-31 NOTE — PROGRESS NOTES
"[facility]   Physical Therapy Treatment Note    Name: Thiago Thacker  Clinic Number: [id number]    Therapy Diagnosis:   Encounter Diagnoses   Name Primary?    Acute pain of right knee Yes    Muscle weakness of lower extremity     Decreased range of motion (ROM) of right knee      Physician: Traci Oconnell MD    Visit Date: 3/31/2022    Physician: Traci Oconnell MD     Physician Orders: PT Eval and Treat   Medical Diagnosis from Referral: Acute pain of right knee [M25.561]  Evaluation Date: 2/8/2022  Authorization Period Expiration: 12/31/22  Plan of Care Expiration: 5/8/22  Progress Note Due: 4/10/22  Visit # / Visits authorized: 11/20 + eval  FOTO: 2/3     Precautions: Standard     PTA Visit #: 0/5     Time In: 10:15 am  Time Out: 10:58  am  Total Billable Time: 43 minutes    SUBJECTIVE     Pt reports: continues to have pain in R low back, leg, and foot.   Response to previous treatment: felt great   Functional change: Ongoing    Pain: 2 /10, intermittent   Location: right knee      OBJECTIVE     Objective measures taken at progress report unless specified otherwise.    Treatment     Thiago received the treatments listed below:      therapeutic exercises to develop strength, endurance, ROM and flexibility for 43 minutes including:    Upright bike 8' Level 3    HS stretch seated hold 10" x 9   Ankle pumps seated upright  sciatic nerve 1 min   Ankle pumps seated slouched sciatic nerve 1 min   SKTC on swiss ball for sciatic nerve x20 B    seated thoracic extension  / rotations x10   thoracic rotation in quadruped (thread the needle) x15 B  +calf stretch on incline level 3 3x30" B   +leg press 40# 3x30  Quad sets x 20 3"  Heels slides  SLR 3 x 10 5# R (pt feels this weight is too much)  SAQ 4 x 10 5# R  LAQ 3 x 10 R 5 # reciprocal inhibition kick up / pull back   LAQ 3x10 5# emphasis on eccentric.   Bridges 4 x 10  SL bridge on heel  x30   Clams GTB 3 x 10 B   SL hip abduction 3  x " 10  Standing  Concentric/eccentric HS flex with 5# X 30  Seated hip internal rotation/ER with GTB 3 x 10  Step down 1 inch step X 30    Tandem balance on foam 2 X 45 seconds   Hip flexor stretch off side of table 1'ea towel roll under buttock   TKE  +seated swiss ball roll outs x20    Manual therapy x0 minutes   T5-12, L1-L5, sacrum  R innominate posterior rotational mobs      Patient Education and Home Exercises     Home Exercises Provided and Patient Education Provided     Education provided: differentiate between muscular and nerve pain confined to the posterior thigh.  Educated to importance of not trying to experiment with various exercises in an attempt to reduce the hamstring pain when he is not certain if muscular or nerve.   -home exercise program reviewed    Written Home Exercises Provided:      Provide update next session     ASSESSMENT     Patient continues to report pain in R low back, hamstring, and foot. The source of these symptoms is unclear at this time. His knee pain has improved since the start of PT. He tolerates activities noted above well today with the exception of straight leg raise which he states the weight is too much. Continue to progress as tolerated. If no change in symptoms, plan for discharge in next 4-5 visits.        Thiago Is progressing well towards his goals.   Pt prognosis is Good.     Pt will continue to benefit from skilled outpatient physical therapy to address the deficits listed in the problem list box on initial evaluation, provide pt/family education and to maximize pt's level of independence in the home and community environment.     Pt's spiritual, cultural and educational needs considered and pt agreeable to plan of care and goals.     Anticipated barriers to physical therapy: Scheduling.    Goals:     GOALS: Short Term Goals:  5 weeks (not met, progressing)  1.Report decreased R knee pain  < / =  4/10 at worst to increase tolerance for prolonged sitting MET  2.  Increase knee ROM to 0 degrees extension in order to be able to perform ADLs without difficulty.  3. Increase strength by 1/3 MMT grade in BLE  to increase tolerance for ADL and work activities. MET  4. Pt to tolerate HEP to improve ROM and independence with ADL's      Long Term Goals: 10 weeks (not met, progressing)  1.Report decreased R knee pain < / = 2/10  to increase tolerance for running MET  2.Patient goal: Pt to be able to run on at least 4 days per week pain free to return to regular exercise   3.Increase strength to >/= 4+/5 in BLE  to increase tolerance for ADL and work activities.  4. Pt will report at CJ level (20-40% impaired) on FOTO knee to demonstrate increase in LE function with every day tasks.     PLAN     Cont. PT POC.     Kassi Chang, PT

## 2022-04-04 NOTE — PROGRESS NOTES
"[facility]   Physical Therapy Treatment Note    Name: Thiago Thacker  Clinic Number: [id number]    Therapy Diagnosis:   Encounter Diagnoses   Name Primary?    Acute pain of right knee Yes    Muscle weakness of lower extremity     Decreased range of motion (ROM) of right knee      Physician: Traci Ocnonell MD    Visit Date: 4/5/2022    Physician: Traci Oconnell MD     Physician Orders: PT Eval and Treat   Medical Diagnosis from Referral: Acute pain of right knee [M25.561]  Evaluation Date: 2/8/2022  Authorization Period Expiration: 12/31/22  Plan of Care Expiration: 5/8/22  Progress Note Due: 4/10/22  Visit # / Visits authorized: 11/20 + eval  FOTO: 2/3     Precautions: Standard     PTA Visit #: 0/5     Time In: 10:47 am  Time Out: 11:28 am  Total Billable Time: 40 minutes    SUBJECTIVE     Pt reports: started sleeping on new mattress last night. He doesn't note any change yet but suspects this will help over the next few weeks.    Response to previous treatment: felt great   Functional change: Ongoing    Pain: 2 /10, intermittent   Location: right knee      OBJECTIVE     Objective measures taken at progress report unless specified otherwise.    Treatment     Thiago received the treatments listed below:      therapeutic exercises to develop strength, endurance, ROM and flexibility for 38 minutes including:    Upright bike 8' Level 3    HS stretch seated hold 10" x 9   Ankle pumps seated upright  sciatic nerve 1 min   Ankle pumps seated slouched sciatic nerve 1 min   SKTC on swiss ball for sciatic nerve x20 B    seated thoracic extension / rotations x15   thoracic rotation in quadruped (thread the needle) x15 B  calf stretch on incline level 3 3x30" B   leg press 40# 3x30  Quad sets x 20 3"  Heels slides  +sidelying ankle inversion/eversion 3x10 B  SLR 3 x 10 2# R   SAQ 3 x 10 5# R  LAQ 3 x 10 R 5 # reciprocal inhibition kick up / pull back   LAQ 3x10 5# emphasis on eccentric.   Bridges " 4 x 10  SL bridge on heel  x30   Clams GTB 3 x 10 B   SL hip abduction 2x10  Lateral band walks with GTB at feet   Standing  Concentric/eccentric HS flex with 5# X 30  Seated hip internal rotation/ER with GTB 3 x 10  Step down 1 inch step X 30    Tandem balance on foam 2 X 45 seconds   Hip flexor stretch off side of table 1'ea towel roll under buttock   TKE  +seated swiss ball roll outs x20    Manual therapy x2 minutes  Talocrural AP mobs Grades 3-4 as tolerated   T5-12, L1-L5, sacrum  R innominate posterior rotational mobs      Patient Education and Home Exercises     Home Exercises Provided and Patient Education Provided     Education provided: differentiate between muscular and nerve pain confined to the posterior thigh.  Educated to importance of not trying to experiment with various exercises in an attempt to reduce the hamstring pain when he is not certain if muscular or nerve.   -home exercise program reviewed    Written Home Exercises Provided:      Provide update next session     ASSESSMENT     Patient continues with complaints of foot pain that also began at the time of his knee and back pain. He states the back and knee is less frequent, but that the hamstring is still tight and the foot pain is lingering. Assessment of joint mobility today reveals limited joint play with AP glides on the R. Mobs were performed with little to no change in response - he may benefit from manipulation of talocrural joint in the future. He tolerates exercises well tdoay. Continue to progress as tolerated.       Thiago Is progressing well towards his goals.   Pt prognosis is Good.     Pt will continue to benefit from skilled outpatient physical therapy to address the deficits listed in the problem list box on initial evaluation, provide pt/family education and to maximize pt's level of independence in the home and community environment.     Pt's spiritual, cultural and educational needs considered and pt agreeable to plan of  care and goals.     Anticipated barriers to physical therapy: Scheduling.    Goals:     GOALS: Short Term Goals:  5 weeks (not met, progressing)  1.Report decreased R knee pain  < / =  4/10 at worst to increase tolerance for prolonged sitting MET  2. Increase knee ROM to 0 degrees extension in order to be able to perform ADLs without difficulty.  3. Increase strength by 1/3 MMT grade in BLE  to increase tolerance for ADL and work activities. MET  4. Pt to tolerate HEP to improve ROM and independence with ADL's      Long Term Goals: 10 weeks (not met, progressing)  1.Report decreased R knee pain < / = 2/10  to increase tolerance for running MET  2.Patient goal: Pt to be able to run on at least 4 days per week pain free to return to regular exercise   3.Increase strength to >/= 4+/5 in BLE  to increase tolerance for ADL and work activities.  4. Pt will report at CJ level (20-40% impaired) on FOTO knee to demonstrate increase in LE function with every day tasks.     PLAN     Cont. PT POC.     Kassi Chang, PT

## 2022-04-05 ENCOUNTER — CLINICAL SUPPORT (OUTPATIENT)
Dept: REHABILITATION | Facility: HOSPITAL | Age: 72
End: 2022-04-05
Attending: FAMILY MEDICINE
Payer: COMMERCIAL

## 2022-04-05 DIAGNOSIS — M25.561 ACUTE PAIN OF RIGHT KNEE: Primary | ICD-10-CM

## 2022-04-05 DIAGNOSIS — M25.661 DECREASED RANGE OF MOTION (ROM) OF RIGHT KNEE: ICD-10-CM

## 2022-04-05 DIAGNOSIS — M62.81 MUSCLE WEAKNESS OF LOWER EXTREMITY: ICD-10-CM

## 2022-04-05 PROCEDURE — 97110 THERAPEUTIC EXERCISES: CPT | Mod: PO

## 2022-04-07 ENCOUNTER — CLINICAL SUPPORT (OUTPATIENT)
Dept: REHABILITATION | Facility: HOSPITAL | Age: 72
End: 2022-04-07
Attending: FAMILY MEDICINE
Payer: MEDICARE

## 2022-04-07 DIAGNOSIS — M62.81 MUSCLE WEAKNESS OF LOWER EXTREMITY: ICD-10-CM

## 2022-04-07 DIAGNOSIS — M25.561 ACUTE PAIN OF RIGHT KNEE: Primary | ICD-10-CM

## 2022-04-07 DIAGNOSIS — M25.661 DECREASED RANGE OF MOTION (ROM) OF RIGHT KNEE: ICD-10-CM

## 2022-04-07 PROCEDURE — 97140 MANUAL THERAPY 1/> REGIONS: CPT | Mod: PO | Performed by: PHYSICAL THERAPIST

## 2022-04-07 PROCEDURE — 97110 THERAPEUTIC EXERCISES: CPT | Mod: PO | Performed by: PHYSICAL THERAPIST

## 2022-04-07 NOTE — PROGRESS NOTES
"[facility]   Physical Therapy Treatment Note    Name: Thiago Thacker  Clinic Number: [id number]    Therapy Diagnosis:   Encounter Diagnoses   Name Primary?    Acute pain of right knee Yes    Muscle weakness of lower extremity     Decreased range of motion (ROM) of right knee      Physician: Traci Oconnell MD    Visit Date: 4/7/2022    Physician: Traci Oconnell MD     Physician Orders: PT Eval and Treat   Medical Diagnosis from Referral: Acute pain of right knee [M25.561]  Evaluation Date: 2/8/2022  Authorization Period Expiration: 12/31/22  Plan of Care Expiration: 5/8/22  Progress Note Due: 4/10/22  Visit # / Visits authorized: 14 / 20 + eval  FOTO: 2/3     Precautions: Standard     PTA Visit #: 0/5     Time In: 10:50 am  Time Out:  1140     am  Total Billable Time: 50 minutes    SUBJECTIVE     Pt reports: the knee is feeling fine but there is a very slight ache that is intermittent. I flet it this AM and was slight. The pain in the hamstring and ankle is more bothersome than the knee. There is some lowback pain today on the right side.   Response to previous treatment: felt great   Functional change: Can get ankle pain just bending over to brush teeth.     Pain: .5-1 /10, intermittent   Location: right knee      OBJECTIVE     Objective measures taken at progress report unless specified otherwise.    Treatment     Thiago received the treatments listed below:      therapeutic exercises to develop strength, endurance, ROM and flexibility for 40 minutes including:    Upright bike 8' Level 3  leg press 40# 3x30    SUPINE  SLR 3 x 10 2# R   SAQ 3 x 10 5# R   Bridges 4 x 10  SL bridge on heel  x30     SITTING  LAQ 3 x 10 R 5 # reciprocal inhibition kick up / pull back   LAQ 3x10 5# emphasis on eccentric  HS stretch seated hold 10" x 9   seated thoracic extension / rotations x15   Ankle pumps seated upright  sciatic nerve 1 min   Ankle pumps seated slouched sciatic nerve 1 min " "    PRONE  sust ext 3'  Rep ext 2x10     SIDE LYING  SL hip abduction 2x10 5#    STANDING   Lateral band walks with GTB at feet   Single Leg squat x20 (avoid rocking )  Standing  Concentric/eccentric HS flex with 5# X 30  Tandem balance on foam 2 X 45 seconds   TKE  thoracic rotation in quadruped (thread the needle) x15 B  calf stretch on incline level 3 3x30" B       Manual therapy: 1-1 PT = 8 min   Cental lumbar cephalo oscillations   Right lumbar unilateral cephalo oscillations  Right sacral caudad oscillations     Talocrural AP mobs Grades 3-4 as tolerated   T5-12, L1-L5, sacrum  R innominate posterior rotational mobs      Patient Education and Home Exercises     Home Exercises Provided and Patient Education Provided     Education provided: differentiate between muscular and nerve pain confined to the posterior thigh.  Educated to importance of not trying to experiment with various exercises in an attempt to reduce the hamstring pain when he is not certain if muscular or nerve.       Written Home Exercises Provided:    Patient instructed to cont prior HEP.  Exercises were reviewed and Thiago was able to demonstrate them prior to the end of the session.  Thiago demonstrated good  understanding of the education provided.     See EMR under Patient Instructions for exercises provided 4/7/2022.  .      ASSESSMENT      Patient performed the exercises noted. He did not encounter any problems with the knee exercises and was able to perform exercises in extension without low back pain. He reported relief at the end of the session in the leg and back. It appears the LE pain in the ankle and gluteal area is a function of his low back. Pain is unresponsive to direct ankle intervention.       Thiago Is progressing well towards his goals.   Pt prognosis is Good.     Pt will continue to benefit from skilled outpatient physical therapy to address the deficits listed in the problem list box on initial evaluation, provide pt/family " education and to maximize pt's level of independence in the home and community environment.     Pt's spiritual, cultural and educational needs considered and pt agreeable to plan of care and goals.     Anticipated barriers to physical therapy: Scheduling.    Goals:     GOALS: Short Term Goals:  5 weeks (not met, progressing)  1.Report decreased R knee pain  < / =  4/10 at worst to increase tolerance for prolonged sitting MET  2. Increase knee ROM to 0 degrees extension in order to be able to perform ADLs without difficulty.  3. Increase strength by 1/3 MMT grade in BLE  to increase tolerance for ADL and work activities. MET  4. Pt to tolerate HEP to improve ROM and independence with ADL's      Long Term Goals: 10 weeks (not met, progressing)  1.Report decreased R knee pain < / = 2/10  to increase tolerance for running MET  2.Patient goal: Pt to be able to run on at least 4 days per week pain free to return to regular exercise   3.Increase strength to >/= 4+/5 in BLE  to increase tolerance for ADL and work activities.  4. Pt will report at CJ level (20-40% impaired) on FOTO knee to demonstrate increase in LE function with every day tasks.     PLAN     Cont. PT POC.     Skyler Roth, PT

## 2022-04-12 ENCOUNTER — CLINICAL SUPPORT (OUTPATIENT)
Dept: REHABILITATION | Facility: HOSPITAL | Age: 72
End: 2022-04-12
Attending: FAMILY MEDICINE
Payer: MEDICARE

## 2022-04-12 DIAGNOSIS — M25.661 DECREASED RANGE OF MOTION (ROM) OF RIGHT KNEE: ICD-10-CM

## 2022-04-12 DIAGNOSIS — M62.81 MUSCLE WEAKNESS OF LOWER EXTREMITY: ICD-10-CM

## 2022-04-12 DIAGNOSIS — M25.561 ACUTE PAIN OF RIGHT KNEE: Primary | ICD-10-CM

## 2022-04-12 PROCEDURE — 97110 THERAPEUTIC EXERCISES: CPT | Mod: PO | Performed by: PHYSICAL THERAPIST

## 2022-04-12 NOTE — PROGRESS NOTES
"[facility]   Physical Therapy Treatment Note    Name: Thiago Thacker  Clinic Number: [id number]    Therapy Diagnosis:   Encounter Diagnoses   Name Primary?    Acute pain of right knee Yes    Muscle weakness of lower extremity     Decreased range of motion (ROM) of right knee      Physician: Traci Oconnell MD    Visit Date: 4/12/2022    Physician: Traci Oconnell MD     Physician Orders: PT Eval and Treat   Medical Diagnosis from Referral: Acute pain of right knee [M25.561]  Evaluation Date: 2/8/2022  Authorization Period Expiration: 12/31/22  Plan of Care Expiration: 5/8/22  Progress Note Due: 4/10/22  Visit # / Visits authorized: 15 / 20 + eval  FOTO: 2/3     Precautions: Standard     PTA Visit #: 0/5     Time In: 10:50 am  Time Out:  1140     am  Total Billable Time: 50 minutes    SUBJECTIVE     Pt reports: the knee is not painful today at least not much but more problem with the hamstring on the right. The knee is less of a problem.     Says he works out every day performing stretches and lifting weights.   Response to previous treatment: felt great   Functional change:   Can get ankle pain just bending over to brush teeth.     Pain: .5-1 /10, intermittent   Location: right knee      OBJECTIVE     Objective measures taken at progress report unless specified otherwise.    Treatment     Thiago received the treatments listed below:      therapeutic exercises to develop strength, endurance, ROM and flexibility for 45 minutes including:    Upright bike 8' Level 3  leg press 40# 3x30    SUPINE  SLR 3 x 10 2# R   SAQ 3 x 10 5# R   Bridges 4 x 10  SL bridge on heel  x30     SITTING  LAQ 3 x 10 R 5 # reciprocal inhibition kick up / pull back   LAQ 3x10 5# emphasis on eccentric  HS stretch seated hold 10" x 9   seated thoracic extension / rotations x10    PRONE  sust ext 3'  Rep ext 2x10     SIDE LYING  SL hip abduction 2x10 5#    STANDING   Lateral band walks with GTB at feet   Single Leg " "squat x20 (avoid rocking )  Running man step ups x20 R- only w/FMT red cord  Squat w/FMT red cord x20  Disc slides x15ea w/o support   Standing  Concentric/eccentric HS flex with 5# X 30  Tandem balance on foam 2 X 45 seconds   TKE  thoracic rotation in quadruped (thread the needle) x15 B  calf stretch on incline level 3 3x30" B       Manual therapy: 1-1 PT = 0 min   Cental lumbar cephalo oscillations   Right lumbar unilateral cephalo oscillations  Right sacral caudad oscillations     Talocrural AP mobs Grades 3-4 as tolerated   T5-12, L1-L5, sacrum  R innominate posterior rotational mobs      Patient Education and Home Exercises     Home Exercises Provided and Patient Education Provided     Education provided: differentiate between muscular and nerve pain confined to the posterior thigh.  Educated to importance of not trying to experiment with various exercises in an attempt to reduce the hamstring pain when he is not certain if muscular or nerve.       Written Home Exercises Provided:    Patient instructed to cont prior HEP.  Exercises were reviewed and Thiago was able to demonstrate them prior to the end of the session.  Thiago demonstrated good  understanding of the education provided.     See EMR under Patient Instructions for exercises provided 4/7/2022.  .      ASSESSMENT     Routine performed and completed. He requires cueing with CC unilateral step-up (running man) to coordinate movements and also with disc slides to manage weight transfer and maintain positioning. He did not report pain of a significant degree to limit the exercise. Addressing low back contributions to his pain.        Thiago Is progressing well towards his goals.   Pt prognosis is Good.     Pt will continue to benefit from skilled outpatient physical therapy to address the deficits listed in the problem list box on initial evaluation, provide pt/family education and to maximize pt's level of independence in the home and community environment. "     Pt's spiritual, cultural and educational needs considered and pt agreeable to plan of care and goals.     Anticipated barriers to physical therapy: Scheduling.    Goals:     GOALS: Short Term Goals:  5 weeks (not met, progressing)  1.Report decreased R knee pain  < / =  4/10 at worst to increase tolerance for prolonged sitting MET  2. Increase knee ROM to 0 degrees extension in order to be able to perform ADLs without difficulty.  3. Increase strength by 1/3 MMT grade in BLE  to increase tolerance for ADL and work activities. MET  4. Pt to tolerate HEP to improve ROM and independence with ADL's      Long Term Goals: 10 weeks (not met, progressing)  1.Report decreased R knee pain < / = 2/10  to increase tolerance for running MET  2.Patient goal: Pt to be able to run on at least 4 days per week pain free to return to regular exercise   3.Increase strength to >/= 4+/5 in BLE  to increase tolerance for ADL and work activities.  4. Pt will report at CJ level (20-40% impaired) on FOTO knee to demonstrate increase in LE function with every day tasks.     PLAN     Cont. PT POC.     Skyler Roth, PT

## 2022-04-19 ENCOUNTER — CLINICAL SUPPORT (OUTPATIENT)
Dept: REHABILITATION | Facility: HOSPITAL | Age: 72
End: 2022-04-19
Attending: FAMILY MEDICINE
Payer: COMMERCIAL

## 2022-04-19 DIAGNOSIS — M62.81 MUSCLE WEAKNESS OF LOWER EXTREMITY: ICD-10-CM

## 2022-04-19 DIAGNOSIS — M25.561 ACUTE PAIN OF RIGHT KNEE: Primary | ICD-10-CM

## 2022-04-19 DIAGNOSIS — M25.661 DECREASED RANGE OF MOTION (ROM) OF RIGHT KNEE: ICD-10-CM

## 2022-04-19 PROCEDURE — 97110 THERAPEUTIC EXERCISES: CPT | Mod: PO | Performed by: PHYSICAL THERAPIST

## 2022-04-19 NOTE — PROGRESS NOTES
"[facility]   Physical Therapy Treatment Note    Name: Thiago Thacker  Clinic Number: [id number]    Therapy Diagnosis:   Encounter Diagnoses   Name Primary?    Acute pain of right knee Yes    Muscle weakness of lower extremity     Decreased range of motion (ROM) of right knee      Physician: Traci Oconnell MD    Visit Date: 4/19/2022    Physician: Traci Oconnell MD     Physician Orders: PT Eval and Treat   Medical Diagnosis from Referral: Acute pain of right knee [M25.561]  Evaluation Date: 2/8/2022  Authorization Period Expiration: 12/31/22  Plan of Care Expiration: 5/8/22  Progress Note Due: 4/10/22  Visit # / Visits authorized: 16 / 20 + eval  FOTO: 2/3     Precautions: Standard     PTA Visit #: 0/5     Time In: 1045 am  Time Out: 1140  am  Total Billable Time: 55 minutes    SUBJECTIVE     Pt reports:  The knee is slightly painful. The ankle still bothers me from time to time.     Says he works out every day performing stretches and lifting weights.   Response to previous treatment: felt great   Functional change:still not able to jog but can walk and do most other things without limitation ..     Pain: 1 /10, intermittent   Location: right knee      OBJECTIVE     Objective measures taken at progress report unless specified otherwise.    Treatment     Thiago received the treatments listed below:      therapeutic exercises to develop strength, endurance, ROM and flexibility for 53 minutes including:    Upright bike 10' Level 3  leg press 50# 4x10  Precor knee extension 10# 3x10  2/2/4       SUPINE  SLR 3 x 10 2# R   SAQ 3 x 10 5# R   Bridges 4 x 10  SL bridge on heel  x30     SITTING  LAQ 3 x 10 R 5 # reciprocal inhibition kick up / pull back   LAQ 3x10 5# emphasis on eccentric  HS stretch seated hold 10" x 9   seated thoracic extension / rotations x10    PRONE  sust ext 3'  Rep ext 2x10     SIDE LYING  SL hip abduction 2x10 5#    STANDING   Lateral band walks with GTB at feet " "  Single Leg squat x20 w/TRX    Single Leg squat x20 w/TRX on jolynn disc   Running man step ups x20 R- only w/FMT red cord  Squat w/FMT red cord x20  Disc slides x15ea w/o support   Standing  Concentric/eccentric HS flex with 5# X 30  Tandem balance on foam 2 X 45 seconds   TKE  thoracic rotation in quadruped (thread the needle) x15 B  calf stretch on incline level 3 3x30" B       Manual therapy: 1-1 PT = 0 min   Cental lumbar cephalo oscillations   Right lumbar unilateral cephalo oscillations  Right sacral caudad oscillations     Talocrural AP mobs Grades 3-4 as tolerated   T5-12, L1-L5, sacrum  R innominate posterior rotational mobs      Patient Education and Home Exercises     Home Exercises Provided and Patient Education Provided     Discussed realistic expectations associated with his knee and activities that he can reasonably participate.    Education provided: differentiate between muscular and nerve pain confined to the posterior thigh.  Educated to importance of not trying to experiment with various exercises in an attempt to reduce the hamstring pain when he is not certain if muscular or nerve.       Written Home Exercises Provided:    Patient instructed to cont prior HEP.  Exercises were reviewed and Thiago was able to demonstrate them prior to the end of the session.  Thiago demonstrated good  understanding of the education provided.     See EMR under Patient Instructions for exercises provided 4/7/2022.  .      ASSESSMENT     There was no limitation demonstrated with the exercises performed. He seemed to demonstrate some degree of denial and thinking he can function as though he had a healthy knee without pathology. He had to be educated that there were certain activities that would not accommodate his knee as jogging. Progress with CC activity to tolerance.       Thiago Is progressing well towards his goals.   Pt prognosis is Good.     Pt will continue to benefit from skilled outpatient physical therapy to " address the deficits listed in the problem list box on initial evaluation, provide pt/family education and to maximize pt's level of independence in the home and community environment.     Pt's spiritual, cultural and educational needs considered and pt agreeable to plan of care and goals.     Anticipated barriers to physical therapy: Scheduling.    Goals:     GOALS: Short Term Goals:  5 weeks (not met, progressing)  1.Report decreased R knee pain  < / =  4/10 at worst to increase tolerance for prolonged sitting MET  2. Increase knee ROM to 0 degrees extension in order to be able to perform ADLs without difficulty.  3. Increase strength by 1/3 MMT grade in BLE  to increase tolerance for ADL and work activities. MET  4. Pt to tolerate HEP to improve ROM and independence with ADL's      Long Term Goals: 10 weeks (not met, progressing)  1.Report decreased R knee pain < / = 2/10  to increase tolerance for running MET  2.Patient goal: Pt to be able to run on at least 4 days per week pain free to return to regular exercise   3.Increase strength to >/= 4+/5 in BLE  to increase tolerance for ADL and work activities.  4. Pt will report at CJ level (20-40% impaired) on FOTO knee to demonstrate increase in LE function with every day tasks.     PLAN     Cont. PT POC.     Skyler Roth, PT

## 2022-04-21 ENCOUNTER — CLINICAL SUPPORT (OUTPATIENT)
Dept: REHABILITATION | Facility: HOSPITAL | Age: 72
End: 2022-04-21
Attending: FAMILY MEDICINE
Payer: COMMERCIAL

## 2022-04-21 DIAGNOSIS — M62.81 MUSCLE WEAKNESS OF LOWER EXTREMITY: ICD-10-CM

## 2022-04-21 DIAGNOSIS — M25.661 DECREASED RANGE OF MOTION (ROM) OF RIGHT KNEE: ICD-10-CM

## 2022-04-21 DIAGNOSIS — M25.561 ACUTE PAIN OF RIGHT KNEE: Primary | ICD-10-CM

## 2022-04-21 PROCEDURE — 97110 THERAPEUTIC EXERCISES: CPT | Mod: PO

## 2022-04-21 NOTE — PROGRESS NOTES
[facility]   Physical Therapy Treatment Note/ Reassessment    Name: Thiago Thacker  Clinic Number: [id number]    Therapy Diagnosis:   Encounter Diagnoses   Name Primary?    Acute pain of right knee Yes    Muscle weakness of lower extremity     Decreased range of motion (ROM) of right knee      Physician: Traci Oconnell MD    Visit Date: 4/21/2022    Physician: Traci Oconnell MD     Physician Orders: PT Eval and Treat   Medical Diagnosis from Referral: Acute pain of right knee [M25.561]  Evaluation Date: 2/8/2022  Authorization Period Expiration: 12/31/22  Plan of Care Expiration: 5/8/22  Progress Note Due: 5/21/22  Visit # / Visits authorized: 16 / 20 + eval  FOTO: 3/3 - need discharge FOTO     Precautions: Standard     PTA Visit #: 0/5     Time In: 10:15 am  Time Out: 10:59 am  Total Billable Time: 44 minutes    SUBJECTIVE     Pt reports:  The knee and ankle are sore today and still having hamstring irritation.     Says he works out every day performing stretches and lifting weights.   Response to previous treatment: felt great   Functional change:still not able to jog but can walk and do most other things without limitation.     Pain: 2.5 /10, intermittent   Location: right knee      OBJECTIVE        Range of Motion (Active):   Knee Right  Left    Flexion 128 130   Extension Lacking 7 with soreness (lacking 4 passively) 0         Lower Extremity Strength  Right LE   Left LE     Quadriceps: 5/5 Quadriceps: 5/5   Hamstrings: 5/5 Hamstrings: 5/5   Hip flexion (seated): 5/5 Hip flexion (seated): 5/5   Hip extension:  4+/5 with pain in the low back  Hip extension: 4+/5 with pain in low back   PGM: 4-/5 (NT 4/21) PGM:  4-/5 (NT 4/21)   Hip ABD:  5/5 Hip ABD:  4+/5      Special Tests:    Right Left   Beltran's Test + -   Thessaly's Test + medial -   Patellar Grind Test + +        Palpation: tenderness at medial joint line R, tenderness at ATFL. Hypertonicity in  "hamstrings     Flexibility:               Hamstrings: R = mod limitation ; L = mod limitation               Esther's test: R = no limitation ; L = no limitation              Dylan test: R = pos ; L = pos              Limitation/Restriction for FOTO knee Survey     Therapist reviewed FOTO scores for Thiago Thacker on 4/21/2022.   FOTO documents entered into EPIC - see Media section.     Limitation Score: 37%          Treatment     Thiago received the treatments listed below:      therapeutic exercises to develop strength, endurance, ROM and flexibility for 44 minutes including:    Upright bike 8' Level 3  leg press 50# 4x10  Precor knee extension 10# 3x10  2/2/4       SUPINE  SLR 3 x 10 2# R   SAQ 3 x 10 5# R   Bridges 4 x 10  SL bridge on heel  x30   Hamstring stretch 3x30" B    SITTING  LAQ 3 x 10 R 5 # reciprocal inhibition kick up / pull back   LAQ 3x10 5# emphasis on eccentric  HS stretch seated hold 10" x 9   seated thoracic extension / rotations x10    PRONE  sust ext 3'  Rep ext 2x10     SIDE LYING  SL hip abduction 2x10 5#    STANDING   Lateral band walks with GTB at feet   Single Leg squat x20 w/TRX B  Single Leg squat x20 w/TRX on jolynn disc   Running man step ups x20 R- only w/FMT red cord  Squat w/FMT red cord x20  5 point disc slides x10 ea w/o support   Lateral band walks GTB above knees 3x10  Standing  Concentric/eccentric HS flex with 5# X 30  Tandem balance on foam 2 X 45 seconds   TKE  thoracic rotation in quadruped (thread the needle) x15 B  calf stretch on incline level 3 3x30" B       Manual therapy: 1-1 PT = 0 min   Cental lumbar cephalo oscillations   Right lumbar unilateral cephalo oscillations  Right sacral caudad oscillations     Talocrural AP mobs Grades 3-4 as tolerated   T5-12, L1-L5, sacrum  R innominate posterior rotational mobs      Patient Education and Home Exercises     Home Exercises Provided and Patient Education Provided     Discussed realistic expectations associated with his knee " and activities that he can reasonably participate.    Education provided: differentiate between muscular and nerve pain confined to the posterior thigh.  Educated to importance of not trying to experiment with various exercises in an attempt to reduce the hamstring pain when he is not certain if muscular or nerve.       Written Home Exercises Provided:    Patient instructed to cont prior HEP.  Exercises were reviewed and Thiago was able to demonstrate them prior to the end of the session.  Thiago demonstrated good  understanding of the education provided.     See EMR under Patient Instructions for exercises provided 4/7/2022.  .      ASSESSMENT     Upon reassessment today, Thiago has made significant progress in LE strength while deficits in R knee ROM deficits remain along with feelings of discomfort in the R ankle and hamstring. At this time, pt may benefit most from activity modification including low impact or no impact activities such as biking, elliptical, or swimming. He has met 6/8 goals set at initial eval and will benefit from discharge to home exercise program for self management of condition in the next 2 visits. Ensure pt has written HEP and good understanding of all exercises included.     Thiago Is progressing well towards his goals.   Pt prognosis is Good.     Pt will continue to benefit from skilled outpatient physical therapy to address the deficits listed in the problem list box on initial evaluation, provide pt/family education and to maximize pt's level of independence in the home and community environment.     Pt's spiritual, cultural and educational needs considered and pt agreeable to plan of care and goals.     Anticipated barriers to physical therapy: Scheduling.    Goals:     GOALS: Short Term Goals:  5 weeks (not met, progressing)  1.Report decreased R knee pain  < / =  4/10 at worst to increase tolerance for prolonged sitting MET  2. Increase knee ROM to 0 degrees extension in order to be able to  perform ADLs without difficulty.  3. Increase strength by 1/3 MMT grade in BLE  to increase tolerance for ADL and work activities. MET  4. Pt to tolerate HEP to improve ROM and independence with ADL's MET     Long Term Goals: 10 weeks (not met, progressing)  1.Report decreased R knee pain < / = 2/10  to increase tolerance for running MET  2.Patient goal: Pt to be able to run on at least 4 days per week pain free to return to regular exercise   3.Increase strength to >/= 4+/5 in BLE  to increase tolerance for ADL and work activities. MET  4. Pt will report at CJ level (20-40% impaired) on FOTO knee to demonstrate increase in LE function with every day tasks. MET    PLAN     Discharge to [facility] within next 2 visits.     Kassi Chang, PT

## 2022-04-22 ENCOUNTER — OFFICE VISIT (OUTPATIENT)
Dept: CARDIOLOGY | Facility: CLINIC | Age: 72
End: 2022-04-22
Payer: COMMERCIAL

## 2022-04-22 VITALS
HEART RATE: 60 BPM | HEIGHT: 71 IN | WEIGHT: 177.25 LBS | BODY MASS INDEX: 24.81 KG/M2 | DIASTOLIC BLOOD PRESSURE: 68 MMHG | SYSTOLIC BLOOD PRESSURE: 117 MMHG | OXYGEN SATURATION: 98 %

## 2022-04-22 DIAGNOSIS — R01.1 MURMUR, CARDIAC: ICD-10-CM

## 2022-04-22 DIAGNOSIS — I25.10 ATHEROSCLEROSIS OF NATIVE CORONARY ARTERY OF NATIVE HEART WITHOUT ANGINA PECTORIS: Primary | ICD-10-CM

## 2022-04-22 DIAGNOSIS — E78.00 HYPERCHOLESTEROLEMIA: ICD-10-CM

## 2022-04-22 PROCEDURE — 99204 OFFICE O/P NEW MOD 45 MIN: CPT | Mod: 25,S$GLB,, | Performed by: INTERNAL MEDICINE

## 2022-04-22 PROCEDURE — 99214 OFFICE O/P EST MOD 30 MIN: CPT | Mod: PBBFAC,PN | Performed by: INTERNAL MEDICINE

## 2022-04-22 PROCEDURE — 99999 PR PBB SHADOW E&M-EST. PATIENT-LVL IV: CPT | Mod: PBBFAC,,, | Performed by: INTERNAL MEDICINE

## 2022-04-22 PROCEDURE — 93005 ELECTROCARDIOGRAM TRACING: CPT | Mod: PBBFAC,PN | Performed by: INTERNAL MEDICINE

## 2022-04-22 PROCEDURE — 93000 ELECTROCARDIOGRAM COMPLETE: CPT | Mod: S$GLB,,, | Performed by: INTERNAL MEDICINE

## 2022-04-22 PROCEDURE — 99204 PR OFFICE/OUTPT VISIT, NEW, LEVL IV, 45-59 MIN: ICD-10-PCS | Mod: 25,S$GLB,, | Performed by: INTERNAL MEDICINE

## 2022-04-22 PROCEDURE — 99999 PR PBB SHADOW E&M-EST. PATIENT-LVL IV: ICD-10-PCS | Mod: PBBFAC,,, | Performed by: INTERNAL MEDICINE

## 2022-04-22 PROCEDURE — 93000 EKG 12-LEAD: ICD-10-PCS | Mod: S$GLB,,, | Performed by: INTERNAL MEDICINE

## 2022-04-22 NOTE — PROGRESS NOTES
[facility]    Chief Complaint  No chief complaint on file.      HPI:    Around 2011, the patient had a myocardial infarction while in [facility].  He had a drug-eluting stent inflation at [facility].  In [facility], he was having some chest tightness when exercising prior to this event.  Has had no recurrence of symptoms since the event.  Exercises regularly.  But recently injured his leg so has only been doing upper body exercises.  Has remained on appropriate secondary prevention measures.  No recent stress test.    Medications  Current Outpatient Medications   Medication Sig Dispense Refill    ascorbate calcium (RUPERT-C ORAL) Take 1 capsule by mouth once daily.      aspirin (ECOTRIN) 81 MG EC tablet Take 81 mg by mouth once daily.      atorvastatin (LIPITOR) 40 MG tablet Take 1 tablet (40 mg total) by mouth once daily. 90 tablet 1    finasteride (PROSCAR) 5 mg tablet Take 1 tablet (5 mg total) by mouth once daily. 90 tablet 1    glucos-msm-collagen-C-Mn-hrb21 500-333-5 mg Cap Take 1 capsule by mouth once daily.      GREEN TEA EXTRACT ORAL Take 1 capsule by mouth once daily.      levothyroxine (SYNTHROID, LEVOTHROID) 175 MCG tablet Take 1 tablet (175 mcg total) by mouth before breakfast. 30 tablet 11    multivitamin (THERAGRAN) per tablet Take 1 tablet by mouth once daily.      omega-3/dha/epa/fish oil (OMEGA-3 FISH OIL ORAL) Take 4 g by mouth once daily.      QUERCETIN DIHYDRATE, BULK, MISC by Misc.(Non-Drug; Combo Route) route. Quercetin      RESVERATROL ORAL Take by mouth. Resveratrol (Longevinex formulation)      rutin/quercetin/bioflav/bilber (BILBERRY EXTRACT ORAL) Take 1 capsule by mouth once daily.      tadalafiL (CIALIS) 5 MG tablet Take 1 tablet (5 mg total) by mouth daily as needed for Erectile Dysfunction. 90 tablet 1    turmeric (CURCUMIN MISC) by Misc.(Non-Drug; Combo Route) route.      levothyroxine 175 mcg Cap Take 175 mcg by mouth once daily. 90 capsule 1     No  current facility-administered medications for this visit.        History  Past Medical History:   Diagnosis Date    Coronary atherosclerosis     Hypercholesterolemia     Myocardial infarction 2011     Past Surgical History:   Procedure Laterality Date    BLEPHAROPLASTY      COMPLETE LASER PHOTOVAPORIZATION OF PROSTATE      CORONARY STENT PLACEMENT  2011    INGUINAL HERNIA REPAIR      vein       Social History     Socioeconomic History    Marital status: Single   Tobacco Use    Smoking status: Never Smoker    Smokeless tobacco: Never Used   Substance and Sexual Activity    Alcohol use: Yes     Alcohol/week: 1.0 standard drink     Types: 1 Glasses of wine per week    Drug use: Never    Sexual activity: Not Currently     Partners: Female     Family History   Problem Relation Age of Onset    Colon cancer Mother 80    Heart attack Father 90        Allergies  Review of patient's allergies indicates:  No Known Allergies    Review of Systems   Review of Systems   Constitutional: Negative for malaise/fatigue, weight gain and weight loss.   Eyes: Negative for visual disturbance.   Cardiovascular: Negative for chest pain, claudication, cyanosis, dyspnea on exertion, irregular heartbeat, leg swelling, near-syncope, orthopnea, palpitations, paroxysmal nocturnal dyspnea and syncope.   Respiratory: Negative for cough, hemoptysis, shortness of breath, sleep disturbances due to breathing and wheezing.    Hematologic/Lymphatic: Negative for bleeding problem. Does not bruise/bleed easily.   Skin: Negative for poor wound healing.   Musculoskeletal: Negative for muscle cramps and myalgias.   Gastrointestinal: Negative for abdominal pain, anorexia, diarrhea, heartburn, hematemesis, hematochezia, melena, nausea and vomiting.   Genitourinary: Negative for hematuria and nocturia.   Neurological: Negative for excessive daytime sleepiness, dizziness, focal weakness, light-headedness and weakness.       Physical Exam  Vitals:     04/22/22 1132   BP: 117/68   Pulse: 60     Wt Readings from Last 1 Encounters:   04/22/22 80.4 kg (177 lb 4 oz)     Physical Exam  Vitals and nursing note reviewed.   Constitutional:       General: He is not in acute distress.     Appearance: He is not toxic-appearing or diaphoretic.   HENT:      Head: Normocephalic and atraumatic.      Mouth/Throat:      Lips: Pink.      Mouth: Mucous membranes are moist.   Eyes:      General: No scleral icterus.     Conjunctiva/sclera: Conjunctivae normal.   Neck:      Thyroid: No thyromegaly.      Vascular: No carotid bruit, hepatojugular reflux or JVD.      Trachea: Trachea normal.   Cardiovascular:      Rate and Rhythm: Normal rate and regular rhythm.  No extrasystoles are present.     Chest Wall: PMI is not displaced.      Pulses:           Carotid pulses are 2+ on the right side and 2+ on the left side.       Radial pulses are 2+ on the right side and 2+ on the left side.        Dorsalis pedis pulses are 2+ on the right side and 2+ on the left side.        Posterior tibial pulses are 2+ on the right side and 2+ on the left side.      Heart sounds: S1 normal and S2 normal. No murmur heard.    No friction rub. No S3 or S4 sounds.   Pulmonary:      Effort: Pulmonary effort is normal. No tachypnea, bradypnea, accessory muscle usage or respiratory distress.      Breath sounds: Normal breath sounds and air entry. No decreased breath sounds, wheezing, rhonchi or rales.   Abdominal:      General: Bowel sounds are normal. There is no distension or abdominal bruit.      Palpations: Abdomen is soft. There is no hepatomegaly, splenomegaly or pulsatile mass.      Tenderness: There is no abdominal tenderness.   Musculoskeletal:         General: No tenderness or deformity.      Right lower leg: No edema.      Left lower leg: No edema.   Skin:     General: Skin is warm and dry.      Capillary Refill: Capillary refill takes less than 2 seconds.      Coloration: Skin is not cyanotic or  pale.      Nails: There is no clubbing.   Neurological:      General: No focal deficit present.      Mental Status: He is alert and oriented to person, place, and time.   Psychiatric:         Attention and Perception: Attention normal.         Mood and Affect: Mood normal.         Speech: Speech normal.         Behavior: Behavior normal. Behavior is cooperative.         Labs  Hospital Outpatient Visit on 03/08/2022   Component Date Value Ref Range Status    Ascending aorta 03/08/2022 3.25  cm Final    STJ 03/08/2022 3.19  cm Final    AV mean gradient 03/08/2022 8  mmHg Final    Ao peak ron 03/08/2022 1.86  m/s Final    Ao VTI 03/08/2022 41.85  cm Final    IVRT 03/08/2022 121.79  msec Final    IVS 03/08/2022 0.98  0.6 - 1.1 cm Final    LA size 03/08/2022 3.60  cm Final    Left Atrium Major Axis 03/08/2022 5.07  cm Final    Left Atrium Minor Axis 03/08/2022 5.41  cm Final    LVIDd 03/08/2022 5.08  3.5 - 6.0 cm Final    LVIDs 03/08/2022 3.13  2.1 - 4.0 cm Final    LVOT diameter 03/08/2022 2.26  cm Final    LVOT peak VTI 03/08/2022 30.76  cm Final    Posterior Wall 03/08/2022 0.94  0.6 - 1.1 cm Final    MV Peak A Ron 03/08/2022 0.55  m/s Final    E wave deceleration time 03/08/2022 331.44  msec Final    MV Peak E Ron 03/08/2022 0.67  m/s Final    RA Major Axis 03/08/2022 5.73  cm Final    RA Width 03/08/2022 2.90  cm Final    RVDD 03/08/2022 3.87  cm Final    Sinus 03/08/2022 3.75  cm Final    TAPSE 03/08/2022 2.04  cm Final    TR Max Ron 03/08/2022 2.04  m/s Final    LA WIDTH 03/08/2022 3.60  cm Final    PV PEAK VELOCITY 03/08/2022 1.22  cm/s Final    LV Diastolic Volume 03/08/2022 122.70  mL Final    LV Systolic Volume 03/08/2022 38.77  mL Final    LVOT peak ron 03/08/2022 1.50  m/s Final    TDI LATERAL 03/08/2022 0.12  m/s Final    TDI SEPTAL 03/08/2022 0.07  m/s Final    Mr max ron 03/08/2022 0.05  m/s Final    LV LATERAL E/E' RATIO 03/08/2022 5.58  m/s Final    LV SEPTAL E/E' RATIO  03/08/2022 9.57  m/s Final    FS 03/08/2022 38  % Final    LA volume 03/08/2022 57.66  cm3 Final    LV mass 03/08/2022 176.90  g Final    Left Ventricle Relative Wall Thick* 03/08/2022 0.37  cm Final    AV valve area 03/08/2022 2.95  cm2 Final    AV Velocity Ratio 03/08/2022 0.81   Final    AV index (prosthetic) 03/08/2022 0.74   Final    E/A ratio 03/08/2022 1.22   Final    Mean e' 03/08/2022 0.10  m/s Final    LVOT area 03/08/2022 4.0  cm2 Final    LVOT stroke volume 03/08/2022 123.33  cm3 Final    AV peak gradient 03/08/2022 14  mmHg Final    E/E' ratio 03/08/2022 7.05  m/s Final    Triscuspid Valve Regurgitation Pea* 03/08/2022 17  mmHg Final    BSA 03/08/2022 2.07  m2 Final    LV Systolic Volume Index 03/08/2022 18.8  mL/m2 Final    LV Diastolic Volume Index 03/08/2022 59.56  mL/m2 Final    LA Volume Index 03/08/2022 28.0  mL/m2 Final    LV Mass Index 03/08/2022 86  g/m2 Final    LA Volume Index (Mod) 03/08/2022 23.8  mL/m2 Final    LA volume (mod) 03/08/2022 49.00  cm3 Final    Right Atrial Pressure (from IVC) 03/08/2022 3  mmHg Final    EF 03/08/2022 65  % Final    TV rest pulmonary artery pressure 03/08/2022 20  mmHg Final   Lab Visit on 01/25/2022   Component Date Value Ref Range Status    WBC 01/25/2022 3.97  3.90 - 12.70 K/uL Final    RBC 01/25/2022 4.62  4.60 - 6.20 M/uL Final    Hemoglobin 01/25/2022 14.8  14.0 - 18.0 g/dL Final    Hematocrit 01/25/2022 44.8  40.0 - 54.0 % Final    MCV 01/25/2022 97  82 - 98 fL Final    MCH 01/25/2022 32.0 (A) 27.0 - 31.0 pg Final    MCHC 01/25/2022 33.0  32.0 - 36.0 g/dL Final    RDW 01/25/2022 12.1  11.5 - 14.5 % Final    Platelets 01/25/2022 113 (A) 150 - 450 K/uL Final    MPV 01/25/2022 11.1  9.2 - 12.9 fL Final    Immature Granulocytes 01/25/2022 0.5  0.0 - 0.5 % Final    Gran # (ANC) 01/25/2022 2.8  1.8 - 7.7 K/uL Final    Immature Grans (Abs) 01/25/2022 0.02  0.00 - 0.04 K/uL Final    Comment: Mild elevation in immature  granulocytes is non specific and   can be seen in a variety of conditions including stress response,   acute inflammation, trauma and pregnancy. Correlation with other   laboratory and clinical findings is essential.      Lymph # 01/25/2022 0.7 (A) 1.0 - 4.8 K/uL Final    Mono # 01/25/2022 0.3  0.3 - 1.0 K/uL Final    Eos # 01/25/2022 0.0  0.0 - 0.5 K/uL Final    Baso # 01/25/2022 0.04  0.00 - 0.20 K/uL Final    nRBC 01/25/2022 0  0 /100 WBC Final    Gran % 01/25/2022 71.5  38.0 - 73.0 % Final    Lymph % 01/25/2022 17.9 (A) 18.0 - 48.0 % Final    Mono % 01/25/2022 8.6  4.0 - 15.0 % Final    Eosinophil % 01/25/2022 0.5  0.0 - 8.0 % Final    Basophil % 01/25/2022 1.0  0.0 - 1.9 % Final    Differential Method 01/25/2022 Automated   Final    Sodium 01/25/2022 140  136 - 145 mmol/L Final    Potassium 01/25/2022 4.1  3.5 - 5.1 mmol/L Final    Chloride 01/25/2022 104  95 - 110 mmol/L Final    CO2 01/25/2022 26  23 - 29 mmol/L Final    Glucose 01/25/2022 98  70 - 110 mg/dL Final    BUN 01/25/2022 25 (A) 8 - 23 mg/dL Final    Creatinine 01/25/2022 1.0  0.5 - 1.4 mg/dL Final    Calcium 01/25/2022 9.5  8.7 - 10.5 mg/dL Final    Total Protein 01/25/2022 6.9  6.0 - 8.4 g/dL Final    Albumin 01/25/2022 3.6  3.5 - 5.2 g/dL Final    Total Bilirubin 01/25/2022 0.7  0.1 - 1.0 mg/dL Final    Comment: For infants and newborns, interpretation of results should be based  on gestational age, weight and in agreement with clinical  observations.    Premature Infant recommended reference ranges:  Up to 24 hours.............<8.0 mg/dL  Up to 48 hours............<12.0 mg/dL  3-5 days..................<15.0 mg/dL  6-29 days.................<15.0 mg/dL      Alkaline Phosphatase 01/25/2022 96  55 - 135 U/L Final    AST 01/25/2022 27  10 - 40 U/L Final    ALT 01/25/2022 27  10 - 44 U/L Final    Anion Gap 01/25/2022 10  8 - 16 mmol/L Final    eGFR if African American 01/25/2022 >60.0  >60 mL/min/1.73 m^2 Final    eGFR if  non  01/25/2022 >60.0  >60 mL/min/1.73 m^2 Final    Comment: Calculation used to obtain the estimated glomerular filtration  rate (eGFR) is the CKD-EPI equation.       Cholesterol 01/25/2022 118 (A) 120 - 199 mg/dL Final    Comment: The National Cholesterol Education Program (NCEP) has set the  following guidelines (reference ranges) for Cholesterol:  Optimal.....................<200 mg/dL  Borderline High.............200-239 mg/dL  High........................> or = 240 mg/dL      Triglycerides 01/25/2022 37  30 - 150 mg/dL Final    Comment: The National Cholesterol Education Program (NCEP) has set the  following guidelines (reference values) for triglycerides:  Normal......................<150 mg/dL  Borderline High.............150-199 mg/dL  High........................200-499 mg/dL      HDL 01/25/2022 54  40 - 75 mg/dL Final    Comment: The National Cholesterol Education Program (NCEP) has set the  following guidelines (reference values) for HDL Cholesterol:  Low...............<40 mg/dL  Optimal...........>60 mg/dL      LDL Cholesterol 01/25/2022 56.6 (A) 63.0 - 159.0 mg/dL Final    Comment: The National Cholesterol Education Program (NCEP) has set the  following guidelines (reference values) for LDL Cholesterol:  Optimal.......................<130 mg/dL  Borderline High...............130-159 mg/dL  High..........................160-189 mg/dL  Very High.....................>190 mg/dL      HDL/Cholesterol Ratio 01/25/2022 45.8  20.0 - 50.0 % Final    Total Cholesterol/HDL Ratio 01/25/2022 2.2  2.0 - 5.0 Final    Non-HDL Cholesterol 01/25/2022 64  mg/dL Final    Comment: Risk category and Non-HDL cholesterol goals:  Coronary heart disease (CHD)or equivalent (10-year risk of CHD >20%):  Non-HDL cholesterol goal     <130 mg/dL  Two or more CHD risk factors and 10-year risk of CHD <= 20%:  Non-HDL cholesterol goal     <160 mg/dL  0 to 1 CHD risk factor:  Non-HDL cholesterol goal     <190  mg/dL      HIV 1/2 Ag/Ab 01/25/2022 Negative  Negative Final    Hepatitis C Ab 01/25/2022 Negative  Negative Final    PSA, Screen 01/25/2022 0.64  0.00 - 4.00 ng/mL Final    Comment: PSA Expected levels:  Hormonal Therapy: <0.05 ng/ml  Prostatectomy: <0.01 ng/ml  Radiation Therapy: <1.00 ng/ml      Free T4 01/25/2022 0.98  0.71 - 1.51 ng/dL Final    TSH 01/25/2022 3.414  0.400 - 4.000 uIU/mL Final       Imaging  No results found.    Assessment  1. Murmur, cardiac  Discussed aortic sclerosis  - Ambulatory referral/consult to Cardiology    2. Atherosclerosis of native coronary artery of native heart without angina pectoris  Stable 10+ years after intervention    3. Hypercholesterolemia  Well controlled      Plan and Discussion    We discussed his secondary prevention measures including his excellent lipid profile and low-dose aspirin.  Discussed that there would be not much utility in a calcium score given his history.  We discussed surveillance stress test.  Will defer this point given his recent leg injury.    Follow Up  Follow up in about 1 year (around 4/22/2023).      Todd Montenegro MD

## 2022-04-26 ENCOUNTER — CLINICAL SUPPORT (OUTPATIENT)
Dept: REHABILITATION | Facility: HOSPITAL | Age: 72
End: 2022-04-26
Attending: FAMILY MEDICINE
Payer: MEDICARE

## 2022-04-26 DIAGNOSIS — M62.81 MUSCLE WEAKNESS OF LOWER EXTREMITY: ICD-10-CM

## 2022-04-26 DIAGNOSIS — M25.561 ACUTE PAIN OF RIGHT KNEE: Primary | ICD-10-CM

## 2022-04-26 DIAGNOSIS — M25.661 DECREASED RANGE OF MOTION (ROM) OF RIGHT KNEE: ICD-10-CM

## 2022-04-26 PROCEDURE — 97110 THERAPEUTIC EXERCISES: CPT | Mod: PO,CQ

## 2022-04-26 NOTE — PROGRESS NOTES
"[facility]   Physical Therapy Treatment Note/ Reassessment    Name: Thiago Thacker  Clinic Number: [id number]    Therapy Diagnosis:   Encounter Diagnoses   Name Primary?    Acute pain of right knee Yes    Muscle weakness of lower extremity     Decreased range of motion (ROM) of right knee      Physician: Traci Oconnell MD    Visit Date: 4/26/2022    Physician: Traci Oconnell MD     Physician Orders: PT Eval and Treat   Medical Diagnosis from Referral: Acute pain of right knee [M25.561]  Evaluation Date: 2/8/2022  Authorization Period Expiration: 12/31/22  Plan of Care Expiration: 5/8/22  Progress Note Due: 5/21/22  Visit # / Visits authorized: 18 / 20 + eval  FOTO: 3/3 - need discharge FOTO     Precautions: Standard     PTA Visit #: 1/5     Time In: 10:45 am  Time Out: 11:25 am  Total Billable Time: 40 minutes    SUBJECTIVE     Pt reports: no new issues or concerns this morning.   Response to previous treatment: felt fine  Functional change: ongoing    Pain:  Not rated /10, intermittent   Location: right knee      OBJECTIVE       Treatment     Thiago received the treatments listed below:      therapeutic exercises to develop strength, endurance, ROM and flexibility for 44 minutes including:    Upright bike 8' Level 3  leg press 50# 4x10  Precor knee extension 10# 3x10  2/2/4       SUPINE  SLR 3 x 10 2# R   SAQ 3 x 10 5# R   Bridges 4 x 10  SL bridge on heel  x30   Hamstring stretch 3x30" B    SITTING  LAQ 3 x 10 R 5 # reciprocal inhibition kick up / pull back   LAQ 3x10 5# emphasis on eccentric  HS stretch seated hold 10" x 9   seated thoracic extension / rotations x10    PRONE  sust ext 3'  Rep ext 2x10     SIDE LYING  SL hip abduction 3 x 10     STANDING   Lateral band walks with GTB at feet   Single Leg squat x20 w/TRX B  Single Leg squat x20 w/TRX on jolynn disc   Running man step ups x20 R- only w/FMT red cord  Squat w/FMT red cord x20  5 point disc slides x10 ea w/o support " "  Lateral band walks GTB above knees 3x10  Standing  Concentric/eccentric HS flex with 5# X 30  Tandem balance on foam 2 X 45 seconds   TKE  thoracic rotation in quadruped (thread the needle) x15 B  calf stretch on incline level 3 3x30" B       Manual therapy: 1-1 PT = 0 min   Cental lumbar cephalo oscillations   Right lumbar unilateral cephalo oscillations  Right sacral caudad oscillations     Talocrural AP mobs Grades 3-4 as tolerated   T5-12, L1-L5, sacrum  R innominate posterior rotational mobs      Patient Education and Home Exercises     Home Exercises Provided and Patient Education Provided     Discussed realistic expectations associated with his knee and activities that he can reasonably participate.    Education provided: differentiate between muscular and nerve pain confined to the posterior thigh.  Educated to importance of not trying to experiment with various exercises in an attempt to reduce the hamstring pain when he is not certain if muscular or nerve.       Written Home Exercises Provided:    Patient instructed to cont prior HEP.  Exercises were reviewed and Thiago was able to demonstrate them prior to the end of the session.  Thiago demonstrated good  understanding of the education provided.     See EMR under Patient Instructions for exercises provided 4/7/2022.  .      ASSESSMENT     Pt performed the above exercises with good tolerance requiring verbal cueing on proper exercise form single leg squats to prevent knee valgus.  Will continue to monitor and progress pt within his tolerance.     Thiago Is progressing well towards his goals.   Pt prognosis is Good.     Pt will continue to benefit from skilled outpatient physical therapy to address the deficits listed in the problem list box on initial evaluation, provide pt/family education and to maximize pt's level of independence in the home and community environment.     Pt's spiritual, cultural and educational needs considered and pt agreeable to plan " of care and goals.     Anticipated barriers to physical therapy: Scheduling.    Goals:     GOALS: Short Term Goals:  5 weeks (not met, progressing)  1.Report decreased R knee pain  < / =  4/10 at worst to increase tolerance for prolonged sitting MET  2. Increase knee ROM to 0 degrees extension in order to be able to perform ADLs without difficulty.  3. Increase strength by 1/3 MMT grade in BLE  to increase tolerance for ADL and work activities. MET  4. Pt to tolerate HEP to improve ROM and independence with ADL's MET     Long Term Goals: 10 weeks (not met, progressing)  1.Report decreased R knee pain < / = 2/10  to increase tolerance for running MET  2.Patient goal: Pt to be able to run on at least 4 days per week pain free to return to regular exercise   3.Increase strength to >/= 4+/5 in BLE  to increase tolerance for ADL and work activities. MET  4. Pt will report at CJ level (20-40% impaired) on FOTO knee to demonstrate increase in LE function with every day tasks. MET    PLAN     Discharge to [facility] within next 2 visits.     Vincent Turner, PTA

## 2022-04-28 ENCOUNTER — CLINICAL SUPPORT (OUTPATIENT)
Dept: REHABILITATION | Facility: HOSPITAL | Age: 72
End: 2022-04-28
Attending: FAMILY MEDICINE
Payer: COMMERCIAL

## 2022-04-28 DIAGNOSIS — M25.561 ACUTE PAIN OF RIGHT KNEE: Primary | ICD-10-CM

## 2022-04-28 DIAGNOSIS — M25.661 DECREASED RANGE OF MOTION (ROM) OF RIGHT KNEE: ICD-10-CM

## 2022-04-28 DIAGNOSIS — M62.81 MUSCLE WEAKNESS OF LOWER EXTREMITY: ICD-10-CM

## 2022-04-28 PROCEDURE — 97110 THERAPEUTIC EXERCISES: CPT | Mod: PO | Performed by: PHYSICAL THERAPIST

## 2022-04-28 NOTE — PROGRESS NOTES
"[facility]   Physical Therapy Treatment Note/ Reassessment    Name: Thiago Thacker  Clinic Number: [id number]    Therapy Diagnosis:   Encounter Diagnoses   Name Primary?    Acute pain of right knee Yes    Muscle weakness of lower extremity     Decreased range of motion (ROM) of right knee      Physician: Traci Oconnell MD    Visit Date: 4/28/2022    Physician: Traci Oconnell MD     Physician Orders: PT Eval and Treat   Medical Diagnosis from Referral: Acute pain of right knee [M25.561]  Evaluation Date: 2/8/2022  Authorization Period Expiration: 12/31/22  Plan of Care Expiration: 5/8/22  Progress Note Due: 5/21/22  Visit # / Visits authorized: 18 / 20 + eval  FOTO: 3/3 - need discharge FOTO     Precautions: Standard     PTA Visit #: 1/5     Time In: 10:50 am  Time Out: 1135 am  Total Billable Time: 45 minutes    SUBJECTIVE     Pt reports: no pain in the knee. Feeling pretty good.    Response to previous treatment: felt fine  Functional change: ongoing    Pain:  0 /10, intermittent   Location: right knee      OBJECTIVE       Treatment     Thiago received the treatments listed below:      therapeutic exercises to develop strength, endurance, ROM and flexibility for 45 minutes including:    Upright bike 8' Level 3  leg press 50# 4x10  Precor knee extension 25# 3x10  2/2/4   Procor knee extension with 10#/5#/0# and reps x20 each     SUPINE  SLR 3 x 10 2# R   SAQ 3 x 10 5# R   Bridges 4 x 10  SL bridge on heel  x30   Hamstring stretch 3x30" B    SITTING  LAQ 3 x 10 R 5 # reciprocal inhibition kick up / pull back   LAQ 3x10 5# emphasis on eccentric  HS stretch seated hold 10" x 9   seated thoracic extension / rotations x10    PRONE  sust ext 3'  Rep ext 2x10     SIDE LYING  SL hip abduction 3 x 10     STANDING   Lateral band walks with GTB at feet   Single Leg squat x20 at at table with hand slide   Single Leg squat x20 w/yellow pole  on jolynn disc   Lateral squat sway x15 15# KB   Unilateral " "stand on blue oval with opp foot on orange theraball for stability 30 "   x 2   Running man step ups x20 R- only w/FMT red cord  Squat w/FMT red cord x20  5 point disc slides x10 ea w/o support   Lateral band walks GTB above knees 3x10  Standing  Concentric/eccentric HS flex with 5# X 30  Tandem balance on foam 2 X 45 seconds   TKE  thoracic rotation in quadruped (thread the needle) x15 B  calf stretch on incline level 3 3x30" B       Manual therapy: 1-1 PT = 0 min   Cental lumbar cephalo oscillations   Right lumbar unilateral cephalo oscillations  Right sacral caudad oscillations     Talocrural AP mobs Grades 3-4 as tolerated   T5-12, L1-L5, sacrum  R innominate posterior rotational mobs      Patient Education and Home Exercises     Home Exercises Provided and Patient Education Provided     Discussed realistic expectations associated with his knee and activities that he can reasonably participate.    Education provided: differentiate between muscular and nerve pain confined to the posterior thigh.  Educated to importance of not trying to experiment with various exercises in an attempt to reduce the hamstring pain when he is not certain if muscular or nerve.       Written Home Exercises Provided:    Patient instructed to cont prior HEP.  Exercises were reviewed and Thiago was able to demonstrate them prior to the end of the session.  Thiago demonstrated good  understanding of the education provided.     See EMR under Patient Instructions for exercises provided 4/7/2022.  .      ASSESSMENT     The patient performed the activities noted. He did not have any significant pain with any of the exercises. He requires verbal cueing to maintain the R-knee in extension with unilateral stand and dynamic activity. Progress with CC activity.      Thiago Is progressing well towards his goals.   Pt prognosis is Good.     Pt will continue to benefit from skilled outpatient physical therapy to address the deficits listed in the problem " list box on initial evaluation, provide pt/family education and to maximize pt's level of independence in the home and community environment.     Pt's spiritual, cultural and educational needs considered and pt agreeable to plan of care and goals.     Anticipated barriers to physical therapy: Scheduling.    Goals:     GOALS: Short Term Goals:  5 weeks (not met, progressing)  1.Report decreased R knee pain  < / =  4/10 at worst to increase tolerance for prolonged sitting MET  2. Increase knee ROM to 0 degrees extension in order to be able to perform ADLs without difficulty.  3. Increase strength by 1/3 MMT grade in BLE  to increase tolerance for ADL and work activities. MET  4. Pt to tolerate HEP to improve ROM and independence with ADL's MET     Long Term Goals: 10 weeks (not met, progressing)  1.Report decreased R knee pain < / = 2/10  to increase tolerance for running MET  2.Patient goal: Pt to be able to run on at least 4 days per week pain free to return to regular exercise   3.Increase strength to >/= 4+/5 in BLE  to increase tolerance for ADL and work activities. MET  4. Pt will report at CJ level (20-40% impaired) on FOTO knee to demonstrate increase in LE function with every day tasks. MET    PLAN     Discharge to [facility] within next 2 visits.     Skyler Roth, PT

## 2022-05-30 ENCOUNTER — OFFICE VISIT (OUTPATIENT)
Dept: SPORTS MEDICINE | Facility: CLINIC | Age: 72
End: 2022-05-30
Payer: COMMERCIAL

## 2022-05-30 VITALS
WEIGHT: 190.06 LBS | HEIGHT: 71 IN | DIASTOLIC BLOOD PRESSURE: 77 MMHG | SYSTOLIC BLOOD PRESSURE: 134 MMHG | HEART RATE: 56 BPM | BODY MASS INDEX: 26.61 KG/M2

## 2022-05-30 DIAGNOSIS — M17.11 PRIMARY OSTEOARTHRITIS OF RIGHT KNEE: Primary | ICD-10-CM

## 2022-05-30 PROCEDURE — 99999 PR PBB SHADOW E&M-EST. PATIENT-LVL III: ICD-10-PCS | Mod: PBBFAC,,, | Performed by: ORTHOPAEDIC SURGERY

## 2022-05-30 PROCEDURE — 99213 OFFICE O/P EST LOW 20 MIN: CPT | Mod: PBBFAC,PN | Performed by: ORTHOPAEDIC SURGERY

## 2022-05-30 PROCEDURE — 99214 OFFICE O/P EST MOD 30 MIN: CPT | Mod: S$GLB,,, | Performed by: ORTHOPAEDIC SURGERY

## 2022-05-30 PROCEDURE — 99999 PR PBB SHADOW E&M-EST. PATIENT-LVL III: CPT | Mod: PBBFAC,,, | Performed by: ORTHOPAEDIC SURGERY

## 2022-05-30 PROCEDURE — 99214 PR OFFICE/OUTPT VISIT, EST, LEVL IV, 30-39 MIN: ICD-10-PCS | Mod: S$GLB,,, | Performed by: ORTHOPAEDIC SURGERY

## 2022-05-30 NOTE — PROGRESS NOTES
CC: Right knee pain    72 y.o. Male who returns to me for a follow up of right knee pain.   I saw him previously for this.  Prior MRI showed meniscus pathology in addition to primary patellofemoral osteoarthritis and chondral wear.  There is subchondral bone marrow edema present. Had some relief with PT, Celebrex and CSI on 2/7/2022.  He continues to have some intermittent activity-related pain, worse with going up and down inclines, hiking, deep squats.  Better with rest.  He is frustrated with his symptoms.  He also describes subjective stiffness.  Denies any recent effusions.  No prominent mechanical complaints.    He is originally from [place] and has read about a meniscus transplant option.  He wants to discuss that today.    REVIEW OF SYSTEMS:   Constitution: Negative. Negative for chills, fever and night sweats.    Hematologic/Lymphatic: Negative for bleeding problem. Does not bruise/bleed easily.   Skin: Negative for dry skin, itching and rash.   Musculoskeletal: Negative for falls. Positive for right knee pain and muscle weakness.     All other review of symptoms were reviewed and found to be noncontributory.     PAST MEDICAL HISTORY:   History reviewed. No pertinent past medical history.    PAST SURGICAL HISTORY:   Past Surgical History:   Procedure Laterality Date    BLEPHAROPLASTY      COMPLETE LASER PHOTOVAPORIZATION OF PROSTATE      INGUINAL HERNIA REPAIR      vein         FAMILY HISTORY:   Family History   Problem Relation Age of Onset    Colon cancer Mother 80       SOCIAL HISTORY:   Social History     Socioeconomic History    Marital status: Single   Tobacco Use    Smoking status: Never Smoker    Smokeless tobacco: Never Used   Substance and Sexual Activity    Alcohol use: Yes     Alcohol/week: 1.0 standard drink     Types: 1 Glasses of wine per week    Drug use: Never    Sexual activity: Not Currently     Partners: Female       MEDICATIONS:     Current Outpatient Medications:      arginine/ornithine/lysine (ARGININE-LYSINE-ORNITHINE ORAL), Take 1 capsule by mouth once daily., Disp: , Rfl:     ascorbate calcium (RUPERT-C ORAL), Take 1 capsule by mouth once daily., Disp: , Rfl:     aspirin (ECOTRIN) 81 MG EC tablet, Take 81 mg by mouth once daily., Disp: , Rfl:     atorvastatin (LIPITOR) 40 MG tablet, Take 1 tablet (40 mg total) by mouth once daily., Disp: 90 tablet, Rfl: 1    finasteride (PROSCAR) 5 mg tablet, Take 1 tablet (5 mg total) by mouth once daily., Disp: 90 tablet, Rfl: 1    glucos-msm-collagen-C-Mn-hrb21 500-333-5 mg Cap, Take 1 capsule by mouth once daily., Disp: , Rfl:     GLYCINE ORAL, Take 1 capsule by mouth once daily. Glycine + N-Acetyl-Cysteine, Disp: , Rfl:     GREEN TEA EXTRACT ORAL, Take 1 capsule by mouth once daily., Disp: , Rfl:     levothyroxine (SYNTHROID, LEVOTHROID) 175 MCG tablet, Take 1 tablet (175 mcg total) by mouth before breakfast., Disp: 30 tablet, Rfl: 11    multivitamin (THERAGRAN) per tablet, Take 1 tablet by mouth once daily., Disp: , Rfl:     omega-3/dha/epa/fish oil (OMEGA-3 FISH OIL ORAL), Take 4 g by mouth once daily., Disp: , Rfl:     QUERCETIN DIHYDRATE, BULK, MISC, by Misc.(Non-Drug; Combo Route) route. Quercetin, Disp: , Rfl:     RESVERATROL ORAL, Take by mouth. Resveratrol (Longevinex formulation), Disp: , Rfl:     rutin/quercetin/bioflav/bilber (BILBERRY EXTRACT ORAL), Take 1 capsule by mouth once daily., Disp: , Rfl:     tadalafiL (CIALIS) 5 MG tablet, Take 1 tablet (5 mg total) by mouth daily as needed for Erectile Dysfunction., Disp: 90 tablet, Rfl: 1    turmeric (CURCUMIN MISC), by Misc.(Non-Drug; Combo Route) route., Disp: , Rfl:     ubidecarenone (COQ-10 ORAL), Take 1 capsule by mouth once daily., Disp: , Rfl:     vit C/E/Zn/coppr/lutein/zeaxan (PRESERVISION AREDS-2 ORAL), Take by mouth. AREDS 2 (B & L, Disp: , Rfl:     celecoxib (CELEBREX) 200 MG capsule, Take 1 capsule (200 mg total) by mouth once daily., Disp: 60  "capsule, Rfl: 2    levothyroxine 175 mcg Cap, Take 175 mcg by mouth once daily., Disp: 90 capsule, Rfl: 1    ALLERGIES:   Review of patient's allergies indicates:  No Known Allergies     PHYSICAL EXAMINATION:  /87   Pulse 63   Ht 5' 11" (1.803 m)   Wt 85.7 kg (189 lb)   BMI 26.36 kg/m²   General: Well-developed well-nourished 72 y.o. malein no acute distress   Cardiovascular: Regular rhythm by palpation of distal pulse, normal color and temperature, no concerning varicosities on symptomatic side   Lungs: No labored breathing or wheezing appreciated   Neuro: Alert and oriented ×3   Psychiatric: well oriented to person, place and time, demonstrates normal mood and affect   Skin: No rashes, lesions or ulcers, normal temperature, turgor, and texture on involved extremity    Ortho/SPM Exam  Examination of the right knee demonstrates positive patellar crepitus and grind.  Positive peripatellar soft tissue tenderness and pain over the patellar facets.  Minimal tenderness over the medial joint line.  No pain specifically with Jose Manuel's testing.  No pain medially with forced flexion or extension.  5° short of full extension, flexion to 135°.  Ligamentously stable.    IMAGING:  X-rays including standing, weight bearing AP and flexion bilateral knees, RIGHT knee lateral and sunrise views ordered and images reviewed by me show:     No fracture or dislocation.  No joint effusion.  Patellofemoral joint space narrowing predominantly medially with medial and lateral joint space narrowing     MRI right knee reviewed by me and discussed with patient. Study shows:  1. Complex and horizontal tears body segment/posterior horn medial meniscus with surrounding synovitis.  2. Edema signal about the superficial and deep MCL, likely reactive to aforementioned medial meniscus tear.  Sequela of a sprain can present with similar findings but is felt to be less likely given absent history of trauma.  3. Tricompartmental " osteoarthritis most pronounced within the patellofemoral and medial tibiofemoral compartments.  4. Proximal patellar tendinosis.  5. Quadriceps fat pad edema with a posterior convex margin, findings that can be seen in the setting of quadriceps fat pad impingement.  6. Small joint effusion with synovitis.     ASSESSMENT:    Right knee osteoarthritis, predominant patellofemoral    PLAN:     I spent a good bit of time with the patient discussing his diagnosis and treatment options.  At this time his symptoms are more localized to the anterior, retropatellar region.  Consistent with symptomatic patellofemoral DJD.  Viscosupplementation is a very good option.  Plan to proceed with that.  If flexor order placed.  We will see him back for the injections.  Discussed exercise activity modifications.  Hip abductor/ VMO strengthening, hamstring stretching.  All questions answered.  He is in agreement with the plan.    In reference to his question, not a candidate for nor would I recommend meniscus transplant surgery in this case.

## 2022-06-02 ENCOUNTER — CLINICAL SUPPORT (OUTPATIENT)
Dept: REHABILITATION | Facility: HOSPITAL | Age: 72
End: 2022-06-02
Attending: FAMILY MEDICINE
Payer: COMMERCIAL

## 2022-06-02 DIAGNOSIS — M25.661 DECREASED RANGE OF MOTION (ROM) OF RIGHT KNEE: ICD-10-CM

## 2022-06-02 DIAGNOSIS — M25.561 ACUTE PAIN OF RIGHT KNEE: Primary | ICD-10-CM

## 2022-06-02 DIAGNOSIS — M62.81 MUSCLE WEAKNESS OF LOWER EXTREMITY: ICD-10-CM

## 2022-06-02 PROCEDURE — 97110 THERAPEUTIC EXERCISES: CPT | Mod: PO | Performed by: PHYSICAL THERAPIST

## 2022-06-02 PROCEDURE — 97164 PT RE-EVAL EST PLAN CARE: CPT | Mod: PO | Performed by: PHYSICAL THERAPIST

## 2022-06-02 PROCEDURE — 97140 MANUAL THERAPY 1/> REGIONS: CPT | Mod: PO | Performed by: PHYSICAL THERAPIST

## 2022-06-02 NOTE — PLAN OF CARE
[facility]   Physical Therapy Treatment Note/ Reassessment    Name: Thiago Thacker  Clinic Number: [id number]    Therapy Diagnosis:   Encounter Diagnoses   Name Primary?    Acute pain of right knee Yes    Muscle weakness of lower extremity     Decreased range of motion (ROM) of right knee      Physician: Traci Oconnell MD    Visit Date: 6/2/2022    Physician: Traci Oconnell MD     Physician Orders: PT Eval and Treat   Medical Diagnosis from Referral: Acute pain of right knee [M25.561]  Evaluation Date: 2/8/2022  Authorization Period Expiration: 12/31/22  Plan of Care Expiration: , 9/2/2022  Progress Note Due: 7/21/22  Visit # / Visits authorized: 20 / 20 + eval  FOTO: 3/3 - need discharge FOTO     Precautions: Standard     PTA Visit #: 1/5     Time In: 1005 am  Time Out: 1055 am  Total Billable Time: 50  minutes    SUBJECTIVE     Pt reports: the patient says that the knee is not getting better. Since the last session in April I saw the MD who reviewed the MRI. He said that he is scheduling me for injections of gel into the knee.   Says he does exercises at home from time to time. Apprehensive to mobilize the knee cap for fear of causing more of a problem especially when it clicks.  Response to previous treatment: following the last session I was doing well until about two weeks when I began to develop pain in the knee. Had done a lot of walking.   Functional change: Not able to run and     Pain:  0 /10, intermittent   Location: right knee      OBJECTIVE     PALPATION - pain along the superior medial and lateral patella facets, lateral patella and inferior lateral patella facet. Pain also at the inferior patella pole.     Range of Motion (Active): RIGHT  Knee ACTIVE PASSIVE   Flexion 125           140   Extension -8 -3         Lower Extremity Strength  Right LE     Quadriceps: 3+/5 with pain   Hamstrings: 4-/5   Hip flexion (seated): 4/5   Hip extension:  4-/5   PGM: 4-/5   Hip ABD:   "4-/5     SINGLE LEG STAND   R/L  DYSFUNCTIONAL / NO PAIN   SQUAT - DYSFUNCTIONAL / PAIN    SPECIAL TESTS   -CLARKS GRIND  + RIGHT   -JOSE MANUEL TEST NEGATIVE FOR MENISCAL PAIN (PAIN WITH ATTEMPTED FULL EXTENSION)        Treatment     Thiago received the treatments listed below:      therapeutic exercises to develop strength, endurance, ROM and flexibility for  minutes including:    Upright bike 8' Level 3  leg press 50# 4x10  Precor knee extension 25# 3x10  2/2/4   Procor knee extension with 10#/5#/0# and reps x20 each     SUPINE  -QUAD SETS HOLD 10" X 20   -POSTERIOR KNEE STRETCH HOLD 1' X2   -SLR 3 x 10 2# R   SAQ 3 x 10 5# R   Bridges 4 x 10  SL bridge on heel  x30   Hamstring stretch 3x30" B    SITTING  LAQ 3 x 10 R 5 # reciprocal inhibition kick up / pull back   LAQ 3x10 5# emphasis on eccentric  -HS stretch seated hold 10" x 9   -POSTERIOR KNEE STRETCH HOLD 1'   -PATELLA MOBILIZATIONS     PRONE  sust ext 3'  Rep ext 2x10     SIDE LYING  SL hip abduction 3 x 10     STANDING   Single Leg squat x20 at at table with hand slide   Single Leg squat x20 w/yellow pole  on jolynn disc   Lateral squat sway x15 15# KB   Unilateral stand on blue oval with opp foot on orange theraball for stability 30 "   x 2   Running man step ups x20 R- only w/FMT red cord  Squat w/FMT red cord x20  5 point disc slides x10 ea w/o support   Lateral band walks GTB above knees 3x10  Standing  Concentric/eccentric HS flex with 5# X 30  Tandem balance on foam 2 X 45 seconds   TKE  thoracic rotation in quadruped (thread the needle) x15 B  calf stretch on incline level 3 3x30" B       Manual therapy: 1-1 PT = 15 Min  Reverse Jose Manuel to achieve full passive extension and remove mechanical block of the meniscus limiting full passive extension.  Passive overstretch into knee hyperextension   Patella mobilizations med/lat, superior / inferior.         Patient Education and Home Exercises     Home Exercises Provided and Patient Education Provided "     Revisited discussion of realistic expectations associated with his knee and activities that he can reasonably participate.  Also reviewed knee anatomy and explained function of the meniscus and the difference from articular cartilage. Discussed prospect of surgical intervention and potential for return to dynamic lifestyle.  Patient also reassured that mobilizing the patella will not cause any problems or extend the condition. Explained that doing nothing will be more harmful.   1-1 PT 15 min   Discussed realistic expectations associated with his knee and activities that he can reasonably participate.    Education provided: differentiate between muscular and nerve pain confined to the posterior thigh.  Educated to importance of not trying to experiment with various exercises in an attempt to reduce the hamstring pain when he is not certain if muscular or nerve.       Written Home Exercises Provided:    Patient instructed to cont prior HEP.  Exercises were reviewed and Thiago was able to demonstrate them prior to the end of the session.  Thiago demonstrated good  understanding of the education provided.     See EMR under Patient Instructions for exercises provided 4/7/2022.  .      ASSESSMENT     The patient did achieve full passive knee extension after manual intervention. He has not been very committed to performing the HEP that was provided but instead seems to choose other options that he either reads about or is told. His expectations for return to jogging seem to not be in-line  with the condition that he is experiencing.  Significant time was spent with education in order for him to have a better understanding and adjust his goals to more realistic expectations at this time. There has been a reduction in quad strength and active extension is not demonstrated. I feel he will benefit from resuming therapy. He agrees that scheduling several sessions will be helpful.      Thiago Is progressing well towards his goals.    Pt prognosis is Good.     Pt will continue to benefit from skilled outpatient physical therapy to address the deficits listed in the problem list box on initial evaluation, provide pt/family education and to maximize pt's level of independence in the home and community environment.     Pt's spiritual, cultural and educational needs considered and pt agreeable to plan of care and goals.     Anticipated barriers to physical therapy: Scheduling.    Goals:     GOALS: Short Term Goals:  5 weeks (not met, progressing)  1.Report decreased R knee pain  < / =  4/10 at worst to increase tolerance for prolonged sitting MET  2. Increase passive knee ROM to 0 degrees extension in order to be able to perform ADLs without difficulty. ONGOING  3. Increase strength by 1/2 MMT grade in right quad and hamstring  to increase tolerance for ADL and work activities. ONGOING  4. Pt to exhibit correct return demonstration of exercises for self-management and independence with HEP ONGOING  5. Pt to demonstrate enhanced neuromuscular response  with single leg static balance for improved functional mobility and gait pattern  ONGOING      Long Term Goals: 10 weeks (not met, progressing)  1.Report decreased R knee pain < / = 2/10  to increase tolerance for running MET  2 Pt to achieve full active knee extension to 0 degrees for functional stability. ONGOING   3.Increase strength to >/= 4+/5 in right quad and hamstring to increase tolerance for ADL and work activities. ONGOING  4. Pt will demonstrate independence with HEP for management of his knee pain and return to functional ADL. ONGOING.     PLAN     Plan of care Certification: 6/2/2022 to 9/2/2022     Outpatient Physical Therapy 2 times weekly for 10 weeks to include the following interventions:  Manual Therapy,  Neuromuscular Re-ed, Patient Education,  and Therapeutic Exercise.     Skyler Roth, PT

## 2022-06-02 NOTE — PROGRESS NOTES
[facility]   Physical Therapy Treatment Note/ Reassessment    Name: Thiago Thacker  Clinic Number: [id number]    Therapy Diagnosis:   Encounter Diagnoses   Name Primary?    Acute pain of right knee Yes    Muscle weakness of lower extremity     Decreased range of motion (ROM) of right knee      Physician: Traci Oconnell MD    Visit Date: 6/2/2022    Physician: Traci Oconnell MD     Physician Orders: PT Eval and Treat   Medical Diagnosis from Referral: Acute pain of right knee [M25.561]  Evaluation Date: 2/8/2022  Authorization Period Expiration: 12/31/22  Plan of Care Expiration: , 9/2/2022  Progress Note Due: 7/21/22  Visit # / Visits authorized: 20 / 20 + eval  FOTO: 3/3 - need discharge FOTO     Precautions: Standard     PTA Visit #: 1/5     Time In: 1005 am  Time Out: 1055 am  Total Billable Time: 50  minutes    SUBJECTIVE     Pt reports: the patient says that the knee is not getting better. Since the last session in April I saw the MD who reviewed the MRI. He said that he is scheduling me for injections of gel into the knee.   Says he does exercises at home from time to time. Apprehensive to mobilize the knee cap for fear of causing more of a problem especially when it clicks.  Response to previous treatment: following the last session I was doing well until about two weeks when I began to develop pain in the knee. Had done a lot of walking.   Functional change: Not able to run and     Pain:  0 /10, intermittent   Location: right knee      OBJECTIVE     PALPATION - pain along the superior medial and lateral patella facets, lateral patella and inferior lateral patella facet. Pain also at the inferior patella pole.    Range of Motion (Active): RIGHT  Knee ACTIVE PASSIVE   Flexion 125           140   Extension -8 -3         Lower Extremity Strength  Right LE     Quadriceps: 3+/5 with pain   Hamstrings: 4-/5   Hip flexion (seated): 4/5   Hip extension:  4-/5   PGM: 4-/5   Hip ABD:   "4-/5     SINGLE LEG STAND   R/L  DYSFUNCTIONAL / NO PAIN   SQUAT - DYSFUNCTIONAL / PAIN    SPECIAL TESTS   -CLARKS GRIND  + RIGHT   -JOSE MANUEL TEST NEGATIVE FOR MENISCAL PAIN (PAIN WITH ATTEMPTED FULL EXTENSION)        Treatment     Thiago received the treatments listed below:      therapeutic exercises to develop strength, endurance, ROM and flexibility for  minutes including:    Upright bike 8' Level 3  leg press 50# 4x10  Precor knee extension 25# 3x10  2/2/4   Procor knee extension with 10#/5#/0# and reps x20 each     SUPINE  -QUAD SETS HOLD 10" X 20   -POSTERIOR KNEE STRETCH HOLD 1' X2   -SLR 3 x 10 2# R   SAQ 3 x 10 5# R   Bridges 4 x 10  SL bridge on heel  x30   Hamstring stretch 3x30" B    SITTING  LAQ 3 x 10 R 5 # reciprocal inhibition kick up / pull back   LAQ 3x10 5# emphasis on eccentric  -HS stretch seated hold 10" x 9   -POSTERIOR KNEE STRETCH HOLD 1'   -PATELLA MOBILIZATIONS     PRONE  sust ext 3'  Rep ext 2x10     SIDE LYING  SL hip abduction 3 x 10     STANDING   Single Leg squat x20 at at table with hand slide   Single Leg squat x20 w/yellow pole  on jolynn disc   Lateral squat sway x15 15# KB   Unilateral stand on blue oval with opp foot on orange theraball for stability 30 "   x 2   Running man step ups x20 R- only w/FMT red cord  Squat w/FMT red cord x20  5 point disc slides x10 ea w/o support   Lateral band walks GTB above knees 3x10  Standing  Concentric/eccentric HS flex with 5# X 30  Tandem balance on foam 2 X 45 seconds   TKE  thoracic rotation in quadruped (thread the needle) x15 B  calf stretch on incline level 3 3x30" B       Manual therapy: 1-1 PT = 15 Min  Reverse Jose Manuel to achieve full passive extension and remove mechanical block of the meniscus limiting full passive extension.  Passive overstretch into knee hyperextension   Patella mobilizations med/lat, superior / inferior.         Patient Education and Home Exercises     Home Exercises Provided and Patient Education Provided "     Revisited discussion of realistic expectations associated with his knee and activities that he can reasonably participate.  Also reviewed knee anatomy and explained function of the meniscus and the difference from articular cartilage. Discussed prospect of surgical intervention and potential for return to dynamic lifestyle.  Patient also reassured that mobilizing the patella will not cause any problems or extend the condition. Explained that doing nothing will be more harmful.   1-1 PT 15 min   Discussed realistic expectations associated with his knee and activities that he can reasonably participate.    Education provided: differentiate between muscular and nerve pain confined to the posterior thigh.  Educated to importance of not trying to experiment with various exercises in an attempt to reduce the hamstring pain when he is not certain if muscular or nerve.       Written Home Exercises Provided:    Patient instructed to cont prior HEP.  Exercises were reviewed and Thiago was able to demonstrate them prior to the end of the session.  Thiago demonstrated good  understanding of the education provided.     See EMR under Patient Instructions for exercises provided 4/7/2022.  .      ASSESSMENT     The patient did achieve full passive knee extension after manual intervention. He has not been very committed to performing the HEP that was provided but instead seems to choose other options that he either reads about or is told. His expectations for return to jogging seem to not be in-line  with the condition that he is experiencing.  Significant time was spent with education in order for him to have a better understanding and adjust his goals to more realistic expectations at this time. There has been a reduction in quad strength and active extension is not demonstrated. I feel he will benefit from resuming therapy. He agrees that scheduling several sessions will be helpful.        Thiago Is progressing well towards his goals.    Pt prognosis is Good.     Pt will continue to benefit from skilled outpatient physical therapy to address the deficits listed in the problem list box on initial evaluation, provide pt/family education and to maximize pt's level of independence in the home and community environment.     Pt's spiritual, cultural and educational needs considered and pt agreeable to plan of care and goals.     Anticipated barriers to physical therapy: Scheduling.    Goals:     GOALS: Short Term Goals:  5 weeks (not met, progressing)  1.Report decreased R knee pain  < / =  4/10 at worst to increase tolerance for prolonged sitting MET  2. Increase passive knee ROM to 0 degrees extension in order to be able to perform ADLs without difficulty. ONGOING  3. Increase strength by 1/2 MMT grade in right quad and hamstring  to increase tolerance for ADL and work activities. ONGOING  4. Pt to exhibit correct return demonstration of exercises for self-management and independence with HEP ONGOING  5. Pt to demonstrate enhanced neuromuscular response  with single leg static balance for improved functional mobility and gait pattern  ONGOING      Long Term Goals: 10 weeks (not met, progressing)  1.Report decreased R knee pain < / = 2/10  to increase tolerance for running MET  2 Pt to achieve full active knee extension to 0 degrees for functional stability. ONGOING   3.Increase strength to >/= 4+/5 in right quad and hamstring to increase tolerance for ADL and work activities. ONGOING  4. Pt will demonstrate independence with HEP for management of his knee pain and return to functional ADL. ONGOING.     PLAN     Plan of care Certification: 6/2/2022 to 9/2/2022     Outpatient Physical Therapy 2 times weekly for 10 weeks to include the following interventions:  Manual Therapy,  Neuromuscular Re-ed, Patient Education,  and Therapeutic Exercise.     Skyler Roth, PT

## 2022-06-07 ENCOUNTER — OFFICE VISIT (OUTPATIENT)
Dept: OPTOMETRY | Facility: CLINIC | Age: 72
End: 2022-06-07
Payer: COMMERCIAL

## 2022-06-07 DIAGNOSIS — H53.2 DOUBLE VISION: Primary | ICD-10-CM

## 2022-06-07 DIAGNOSIS — H52.4 MYOPIA OF BOTH EYES WITH REGULAR ASTIGMATISM AND PRESBYOPIA: ICD-10-CM

## 2022-06-07 DIAGNOSIS — H52.223 MYOPIA OF BOTH EYES WITH REGULAR ASTIGMATISM AND PRESBYOPIA: ICD-10-CM

## 2022-06-07 DIAGNOSIS — H52.13 MYOPIA OF BOTH EYES WITH REGULAR ASTIGMATISM AND PRESBYOPIA: ICD-10-CM

## 2022-06-07 DIAGNOSIS — H50.011 ESOTROPIA OF RIGHT EYE: ICD-10-CM

## 2022-06-07 PROCEDURE — 1160F RVW MEDS BY RX/DR IN RCRD: CPT | Mod: CPTII,S$GLB,, | Performed by: OPTOMETRIST

## 2022-06-07 PROCEDURE — 1160F PR REVIEW ALL MEDS BY PRESCRIBER/CLIN PHARMACIST DOCUMENTED: ICD-10-PCS | Mod: CPTII,S$GLB,, | Performed by: OPTOMETRIST

## 2022-06-07 PROCEDURE — 1101F PT FALLS ASSESS-DOCD LE1/YR: CPT | Mod: CPTII,S$GLB,, | Performed by: OPTOMETRIST

## 2022-06-07 PROCEDURE — 1159F MED LIST DOCD IN RCRD: CPT | Mod: CPTII,S$GLB,, | Performed by: OPTOMETRIST

## 2022-06-07 PROCEDURE — 99999 PR PBB SHADOW E&M-EST. PATIENT-LVL III: ICD-10-PCS | Mod: PBBFAC,,, | Performed by: OPTOMETRIST

## 2022-06-07 PROCEDURE — 3288F FALL RISK ASSESSMENT DOCD: CPT | Mod: CPTII,S$GLB,, | Performed by: OPTOMETRIST

## 2022-06-07 PROCEDURE — 92015 DETERMINE REFRACTIVE STATE: CPT | Mod: S$GLB,,, | Performed by: OPTOMETRIST

## 2022-06-07 PROCEDURE — 3288F PR FALLS RISK ASSESSMENT DOCUMENTED: ICD-10-PCS | Mod: CPTII,S$GLB,, | Performed by: OPTOMETRIST

## 2022-06-07 PROCEDURE — 1126F PR PAIN SEVERITY QUANTIFIED, NO PAIN PRESENT: ICD-10-PCS | Mod: CPTII,S$GLB,, | Performed by: OPTOMETRIST

## 2022-06-07 PROCEDURE — 1159F PR MEDICATION LIST DOCUMENTED IN MEDICAL RECORD: ICD-10-PCS | Mod: CPTII,S$GLB,, | Performed by: OPTOMETRIST

## 2022-06-07 PROCEDURE — 92015 PR REFRACTION: ICD-10-PCS | Mod: S$GLB,,, | Performed by: OPTOMETRIST

## 2022-06-07 PROCEDURE — 1101F PR PT FALLS ASSESS DOC 0-1 FALLS W/OUT INJ PAST YR: ICD-10-PCS | Mod: CPTII,S$GLB,, | Performed by: OPTOMETRIST

## 2022-06-07 PROCEDURE — 92012 INTRM OPH EXAM EST PATIENT: CPT | Mod: S$GLB,,, | Performed by: OPTOMETRIST

## 2022-06-07 PROCEDURE — 99999 PR PBB SHADOW E&M-EST. PATIENT-LVL III: CPT | Mod: PBBFAC,,, | Performed by: OPTOMETRIST

## 2022-06-07 PROCEDURE — 92012 PR EYE EXAM, EST PATIENT,INTERMED: ICD-10-PCS | Mod: S$GLB,,, | Performed by: OPTOMETRIST

## 2022-06-07 PROCEDURE — 1126F AMNT PAIN NOTED NONE PRSNT: CPT | Mod: CPTII,S$GLB,, | Performed by: OPTOMETRIST

## 2022-06-07 NOTE — Clinical Note
Hi Dr. 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 He needs a large amount of prism for single vision. About 8-10 prism diopters. Do you want to refer him for a workup on the etiology of the diplopia ?   I can Rx the glasses temporarily, but we may want to investigate the cause of the recent diplopia. Let me know what you want to do.  Thanks  Dr. HAYES

## 2022-06-07 NOTE — PROGRESS NOTES
HPI     Eye Problem      Additional comments: Diplopia at dist              Comments     71 Y/o male is here for Prism eval per Dr. Addison   Pt denies pain and discomfort   No f/f    Eye med:           Last edited by Ezequiel Almanza, OD on 6/7/2022 11:19 AM. (History)            Assessment /Plan     For exam results, see Encounter Report.    Double vision    Esotropia of right eye    Myopia of both eyes with regular astigmatism and presbyopia      1,2. Large esotropia right eye, 8-10 prism diopters to resolves, discuss with Cyn for referral.   3. Will rx prism after discuss with Cyn

## 2022-06-27 ENCOUNTER — OFFICE VISIT (OUTPATIENT)
Dept: SPORTS MEDICINE | Facility: CLINIC | Age: 72
End: 2022-06-27
Payer: COMMERCIAL

## 2022-06-27 VITALS
BODY MASS INDEX: 26.45 KG/M2 | HEART RATE: 57 BPM | WEIGHT: 188.94 LBS | HEIGHT: 71 IN | SYSTOLIC BLOOD PRESSURE: 126 MMHG | DIASTOLIC BLOOD PRESSURE: 73 MMHG

## 2022-06-27 DIAGNOSIS — M17.11 PRIMARY OSTEOARTHRITIS OF RIGHT KNEE: Primary | ICD-10-CM

## 2022-06-27 PROCEDURE — 99499 NO LOS: ICD-10-PCS | Mod: S$GLB,,, | Performed by: ORTHOPAEDIC SURGERY

## 2022-06-27 PROCEDURE — 3008F PR BODY MASS INDEX (BMI) DOCUMENTED: ICD-10-PCS | Mod: CPTII,S$GLB,, | Performed by: ORTHOPAEDIC SURGERY

## 2022-06-27 PROCEDURE — 1159F PR MEDICATION LIST DOCUMENTED IN MEDICAL RECORD: ICD-10-PCS | Mod: CPTII,S$GLB,, | Performed by: ORTHOPAEDIC SURGERY

## 2022-06-27 PROCEDURE — 20610 LARGE JOINT ASPIRATION/INJECTION: R KNEE: ICD-10-PCS | Mod: RT,S$GLB,, | Performed by: ORTHOPAEDIC SURGERY

## 2022-06-27 PROCEDURE — 3288F FALL RISK ASSESSMENT DOCD: CPT | Mod: CPTII,S$GLB,, | Performed by: ORTHOPAEDIC SURGERY

## 2022-06-27 PROCEDURE — 1101F PR PT FALLS ASSESS DOC 0-1 FALLS W/OUT INJ PAST YR: ICD-10-PCS | Mod: CPTII,S$GLB,, | Performed by: ORTHOPAEDIC SURGERY

## 2022-06-27 PROCEDURE — 3074F SYST BP LT 130 MM HG: CPT | Mod: CPTII,S$GLB,, | Performed by: ORTHOPAEDIC SURGERY

## 2022-06-27 PROCEDURE — 1126F AMNT PAIN NOTED NONE PRSNT: CPT | Mod: CPTII,S$GLB,, | Performed by: ORTHOPAEDIC SURGERY

## 2022-06-27 PROCEDURE — 3078F PR MOST RECENT DIASTOLIC BLOOD PRESSURE < 80 MM HG: ICD-10-PCS | Mod: CPTII,S$GLB,, | Performed by: ORTHOPAEDIC SURGERY

## 2022-06-27 PROCEDURE — 3288F PR FALLS RISK ASSESSMENT DOCUMENTED: ICD-10-PCS | Mod: CPTII,S$GLB,, | Performed by: ORTHOPAEDIC SURGERY

## 2022-06-27 PROCEDURE — 1126F PR PAIN SEVERITY QUANTIFIED, NO PAIN PRESENT: ICD-10-PCS | Mod: CPTII,S$GLB,, | Performed by: ORTHOPAEDIC SURGERY

## 2022-06-27 PROCEDURE — 3008F BODY MASS INDEX DOCD: CPT | Mod: CPTII,S$GLB,, | Performed by: ORTHOPAEDIC SURGERY

## 2022-06-27 PROCEDURE — 3078F DIAST BP <80 MM HG: CPT | Mod: CPTII,S$GLB,, | Performed by: ORTHOPAEDIC SURGERY

## 2022-06-27 PROCEDURE — 1101F PT FALLS ASSESS-DOCD LE1/YR: CPT | Mod: CPTII,S$GLB,, | Performed by: ORTHOPAEDIC SURGERY

## 2022-06-27 PROCEDURE — 3074F PR MOST RECENT SYSTOLIC BLOOD PRESSURE < 130 MM HG: ICD-10-PCS | Mod: CPTII,S$GLB,, | Performed by: ORTHOPAEDIC SURGERY

## 2022-06-27 PROCEDURE — 99499 UNLISTED E&M SERVICE: CPT | Mod: S$GLB,,, | Performed by: ORTHOPAEDIC SURGERY

## 2022-06-27 PROCEDURE — 99999 PR PBB SHADOW E&M-EST. PATIENT-LVL III: ICD-10-PCS | Mod: PBBFAC,,, | Performed by: ORTHOPAEDIC SURGERY

## 2022-06-27 PROCEDURE — 1159F MED LIST DOCD IN RCRD: CPT | Mod: CPTII,S$GLB,, | Performed by: ORTHOPAEDIC SURGERY

## 2022-06-27 PROCEDURE — 99999 PR PBB SHADOW E&M-EST. PATIENT-LVL III: CPT | Mod: PBBFAC,,, | Performed by: ORTHOPAEDIC SURGERY

## 2022-06-27 PROCEDURE — 20610 DRAIN/INJ JOINT/BURSA W/O US: CPT | Mod: RT,S$GLB,, | Performed by: ORTHOPAEDIC SURGERY

## 2022-06-27 NOTE — PROGRESS NOTES
Patient is here for follow up of his knee right chondromalacia and arthritis. He is here today for a planned Euflexxa Injection (1/3) (LOT #[id number]). He understands potential risks and benefits of the procedure which includes pseudoseptic reaction and pain. He has failed conservative management to this point for his knee pain including NSAIDS.    Exam findings remain unchanged from most recent visit. No erythema, warmth, or signs of infection.     The patient tolerated the procedure well. We discussed post-injection care of the knee. Plan for continued conservative management as discussed before. All questions were answered.     Large Joint Aspiration/Injection: R knee    Date/Time: 6/27/2022 2:00 PM  Performed by: JEREMI King MD  Authorized by: JEREMI King MD     Consent Done?:  Yes (Verbal)  Indications:  Pain  Site marked: the procedure site was marked    Timeout: prior to procedure the correct patient, procedure, and site was verified    Prep: patient was prepped and draped in usual sterile fashion      Details:  Needle Size:  22 G  Ultrasonic Guidance for needle placement?: No    Approach:  Lateral  Location:  Knee  Site:  R knee  Medications:  20 mg sodium hyaluronate (EUFLEXXA) 10 mg/mL(mw 2.4 -3.6 million)  Patient tolerance:  Patient tolerated the procedure well with no immediate complications

## 2022-07-05 ENCOUNTER — OFFICE VISIT (OUTPATIENT)
Dept: SPORTS MEDICINE | Facility: CLINIC | Age: 72
End: 2022-07-05
Payer: COMMERCIAL

## 2022-07-05 VITALS
DIASTOLIC BLOOD PRESSURE: 71 MMHG | SYSTOLIC BLOOD PRESSURE: 112 MMHG | BODY MASS INDEX: 25.9 KG/M2 | HEART RATE: 59 BPM | WEIGHT: 185 LBS | HEIGHT: 71 IN

## 2022-07-05 DIAGNOSIS — M17.11 PRIMARY OSTEOARTHRITIS OF RIGHT KNEE: Primary | ICD-10-CM

## 2022-07-05 PROCEDURE — 99499 NO LOS: ICD-10-PCS | Mod: S$GLB,,, | Performed by: ORTHOPAEDIC SURGERY

## 2022-07-05 PROCEDURE — 20610 LARGE JOINT ASPIRATION/INJECTION: R KNEE: ICD-10-PCS | Mod: RT,S$GLB,, | Performed by: ORTHOPAEDIC SURGERY

## 2022-07-05 PROCEDURE — 1101F PR PT FALLS ASSESS DOC 0-1 FALLS W/OUT INJ PAST YR: ICD-10-PCS | Mod: CPTII,S$GLB,, | Performed by: ORTHOPAEDIC SURGERY

## 2022-07-05 PROCEDURE — 3074F PR MOST RECENT SYSTOLIC BLOOD PRESSURE < 130 MM HG: ICD-10-PCS | Mod: CPTII,S$GLB,, | Performed by: ORTHOPAEDIC SURGERY

## 2022-07-05 PROCEDURE — 3078F PR MOST RECENT DIASTOLIC BLOOD PRESSURE < 80 MM HG: ICD-10-PCS | Mod: CPTII,S$GLB,, | Performed by: ORTHOPAEDIC SURGERY

## 2022-07-05 PROCEDURE — 99999 PR PBB SHADOW E&M-EST. PATIENT-LVL III: ICD-10-PCS | Mod: PBBFAC,,, | Performed by: ORTHOPAEDIC SURGERY

## 2022-07-05 PROCEDURE — 3008F PR BODY MASS INDEX (BMI) DOCUMENTED: ICD-10-PCS | Mod: CPTII,S$GLB,, | Performed by: ORTHOPAEDIC SURGERY

## 2022-07-05 PROCEDURE — 20610 DRAIN/INJ JOINT/BURSA W/O US: CPT | Mod: RT,S$GLB,, | Performed by: ORTHOPAEDIC SURGERY

## 2022-07-05 PROCEDURE — 3078F DIAST BP <80 MM HG: CPT | Mod: CPTII,S$GLB,, | Performed by: ORTHOPAEDIC SURGERY

## 2022-07-05 PROCEDURE — 99499 UNLISTED E&M SERVICE: CPT | Mod: S$GLB,,, | Performed by: ORTHOPAEDIC SURGERY

## 2022-07-05 PROCEDURE — 1159F MED LIST DOCD IN RCRD: CPT | Mod: CPTII,S$GLB,, | Performed by: ORTHOPAEDIC SURGERY

## 2022-07-05 PROCEDURE — 1125F PR PAIN SEVERITY QUANTIFIED, PAIN PRESENT: ICD-10-PCS | Mod: CPTII,S$GLB,, | Performed by: ORTHOPAEDIC SURGERY

## 2022-07-05 PROCEDURE — 1125F AMNT PAIN NOTED PAIN PRSNT: CPT | Mod: CPTII,S$GLB,, | Performed by: ORTHOPAEDIC SURGERY

## 2022-07-05 PROCEDURE — 3288F PR FALLS RISK ASSESSMENT DOCUMENTED: ICD-10-PCS | Mod: CPTII,S$GLB,, | Performed by: ORTHOPAEDIC SURGERY

## 2022-07-05 PROCEDURE — 99999 PR PBB SHADOW E&M-EST. PATIENT-LVL III: CPT | Mod: PBBFAC,,, | Performed by: ORTHOPAEDIC SURGERY

## 2022-07-05 PROCEDURE — 1159F PR MEDICATION LIST DOCUMENTED IN MEDICAL RECORD: ICD-10-PCS | Mod: CPTII,S$GLB,, | Performed by: ORTHOPAEDIC SURGERY

## 2022-07-05 PROCEDURE — 3008F BODY MASS INDEX DOCD: CPT | Mod: CPTII,S$GLB,, | Performed by: ORTHOPAEDIC SURGERY

## 2022-07-05 PROCEDURE — 1101F PT FALLS ASSESS-DOCD LE1/YR: CPT | Mod: CPTII,S$GLB,, | Performed by: ORTHOPAEDIC SURGERY

## 2022-07-05 PROCEDURE — 3074F SYST BP LT 130 MM HG: CPT | Mod: CPTII,S$GLB,, | Performed by: ORTHOPAEDIC SURGERY

## 2022-07-05 PROCEDURE — 3288F FALL RISK ASSESSMENT DOCD: CPT | Mod: CPTII,S$GLB,, | Performed by: ORTHOPAEDIC SURGERY

## 2022-07-05 NOTE — PROGRESS NOTES
Patient is here for follow up of his knee right chondromalacia and arthritis. He is here today for a planned Euflexxa Injection (2/3) (LOT #[id number]). He understands potential risks and benefits of the procedure which includes pseudoseptic reaction and pain. He has failed conservative management to this point for his knee pain including NSAIDS.    Exam findings remain unchanged from most recent visit. No erythema, warmth, or signs of infection.     The patient tolerated the procedure well. We discussed post-injection care of the knee. Plan for continued conservative management as discussed before. All questions were answered.

## 2022-07-05 NOTE — PROCEDURES
Large Joint Aspiration/Injection: R knee    Date/Time: 7/5/2022 10:00 AM  Performed by: JEREMI King MD  Authorized by: JEREMI King MD     Consent Done?:  Yes (Verbal)  Indications:  Pain  Site marked: the procedure site was marked    Timeout: prior to procedure the correct patient, procedure, and site was verified    Prep: patient was prepped and draped in usual sterile fashion      Details:  Needle Size:  22 G  Ultrasonic Guidance for needle placement?: No    Approach:  Lateral  Location:  Knee  Site:  R knee  Medications:  20 mg sodium hyaluronate (EUFLEXXA) 10 mg/mL(mw 2.4 -3.6 million)  Patient tolerance:  Patient tolerated the procedure well with no immediate complications

## 2022-07-08 ENCOUNTER — TELEPHONE (OUTPATIENT)
Dept: SPORTS MEDICINE | Facility: CLINIC | Age: 72
End: 2022-07-08
Payer: MEDICARE

## 2022-07-12 ENCOUNTER — OFFICE VISIT (OUTPATIENT)
Dept: SPORTS MEDICINE | Facility: CLINIC | Age: 72
End: 2022-07-12
Payer: COMMERCIAL

## 2022-07-12 VITALS
HEART RATE: 62 BPM | SYSTOLIC BLOOD PRESSURE: 134 MMHG | WEIGHT: 185.63 LBS | DIASTOLIC BLOOD PRESSURE: 83 MMHG | HEIGHT: 71 IN | BODY MASS INDEX: 25.99 KG/M2

## 2022-07-12 DIAGNOSIS — M17.11 PRIMARY OSTEOARTHRITIS OF RIGHT KNEE: Primary | ICD-10-CM

## 2022-07-12 PROCEDURE — 3008F PR BODY MASS INDEX (BMI) DOCUMENTED: ICD-10-PCS | Mod: CPTII,S$GLB,, | Performed by: ORTHOPAEDIC SURGERY

## 2022-07-12 PROCEDURE — 3288F FALL RISK ASSESSMENT DOCD: CPT | Mod: CPTII,S$GLB,, | Performed by: ORTHOPAEDIC SURGERY

## 2022-07-12 PROCEDURE — 20610 DRAIN/INJ JOINT/BURSA W/O US: CPT | Mod: RT,S$GLB,, | Performed by: ORTHOPAEDIC SURGERY

## 2022-07-12 PROCEDURE — 1159F MED LIST DOCD IN RCRD: CPT | Mod: CPTII,S$GLB,, | Performed by: ORTHOPAEDIC SURGERY

## 2022-07-12 PROCEDURE — 3075F PR MOST RECENT SYSTOLIC BLOOD PRESS GE 130-139MM HG: ICD-10-PCS | Mod: CPTII,S$GLB,, | Performed by: ORTHOPAEDIC SURGERY

## 2022-07-12 PROCEDURE — 99999 PR PBB SHADOW E&M-EST. PATIENT-LVL IV: CPT | Mod: PBBFAC,,, | Performed by: ORTHOPAEDIC SURGERY

## 2022-07-12 PROCEDURE — 3079F DIAST BP 80-89 MM HG: CPT | Mod: CPTII,S$GLB,, | Performed by: ORTHOPAEDIC SURGERY

## 2022-07-12 PROCEDURE — 3008F BODY MASS INDEX DOCD: CPT | Mod: CPTII,S$GLB,, | Performed by: ORTHOPAEDIC SURGERY

## 2022-07-12 PROCEDURE — 99499 UNLISTED E&M SERVICE: CPT | Mod: S$GLB,,, | Performed by: ORTHOPAEDIC SURGERY

## 2022-07-12 PROCEDURE — 1125F AMNT PAIN NOTED PAIN PRSNT: CPT | Mod: CPTII,S$GLB,, | Performed by: ORTHOPAEDIC SURGERY

## 2022-07-12 PROCEDURE — 1101F PR PT FALLS ASSESS DOC 0-1 FALLS W/OUT INJ PAST YR: ICD-10-PCS | Mod: CPTII,S$GLB,, | Performed by: ORTHOPAEDIC SURGERY

## 2022-07-12 PROCEDURE — 3079F PR MOST RECENT DIASTOLIC BLOOD PRESSURE 80-89 MM HG: ICD-10-PCS | Mod: CPTII,S$GLB,, | Performed by: ORTHOPAEDIC SURGERY

## 2022-07-12 PROCEDURE — 3075F SYST BP GE 130 - 139MM HG: CPT | Mod: CPTII,S$GLB,, | Performed by: ORTHOPAEDIC SURGERY

## 2022-07-12 PROCEDURE — 99999 PR PBB SHADOW E&M-EST. PATIENT-LVL IV: ICD-10-PCS | Mod: PBBFAC,,, | Performed by: ORTHOPAEDIC SURGERY

## 2022-07-12 PROCEDURE — 1159F PR MEDICATION LIST DOCUMENTED IN MEDICAL RECORD: ICD-10-PCS | Mod: CPTII,S$GLB,, | Performed by: ORTHOPAEDIC SURGERY

## 2022-07-12 PROCEDURE — 1101F PT FALLS ASSESS-DOCD LE1/YR: CPT | Mod: CPTII,S$GLB,, | Performed by: ORTHOPAEDIC SURGERY

## 2022-07-12 PROCEDURE — 99499 NO LOS: ICD-10-PCS | Mod: S$GLB,,, | Performed by: ORTHOPAEDIC SURGERY

## 2022-07-12 PROCEDURE — 1125F PR PAIN SEVERITY QUANTIFIED, PAIN PRESENT: ICD-10-PCS | Mod: CPTII,S$GLB,, | Performed by: ORTHOPAEDIC SURGERY

## 2022-07-12 PROCEDURE — 3288F PR FALLS RISK ASSESSMENT DOCUMENTED: ICD-10-PCS | Mod: CPTII,S$GLB,, | Performed by: ORTHOPAEDIC SURGERY

## 2022-07-12 PROCEDURE — 20610 LARGE JOINT ASPIRATION/INJECTION: R KNEE: ICD-10-PCS | Mod: RT,S$GLB,, | Performed by: ORTHOPAEDIC SURGERY

## 2022-07-12 NOTE — PROGRESS NOTES
Subjective:       Patient ID: Thiago Thacker is a 72 y.o. male.    Chief Complaint: Blurred Vision (Cataract re-eval- IOL cals)     HPI     Blurred Vision     Comments: Cataract re-eval- IOL cals              Comments     Pt continues to have monocular diplopia in OD.    Combined forms of age-related cataract of both eyes  Exophoria of both eyes  Corneal dellen of left eye    Systane gel QHS OU  At's PRN OU          Last edited by Jesse Garcia on 7/13/2022 11:42 AM. (History)              Assessment & Plan   Combined forms of age-related cataract of both eyes    Esotropia of right eye    Diplopia  -     MRI Brain W WO Contrast; Future; Expected date: 07/13/2022  -     Creatinine, serum; Future; Expected date: 07/13/2022       Combined forms of age-related cataract OU  Elect to continue to correct with glasses and continue to monitor.   Visually significant and interfering with activities of daily living including driving/reading  Pt wants to think about it  RTC 4 months to revisit      RIGHT EYE DIBOO +11.5 target -0.24  LEFT EYE DIBOO +12.50 target -0.27    IOL measurements OD not stable    Will get MRI first  When pt ready for CE IOL OD -- Need to repeat IOL measurements       High myopia OU  Cont glasses     Right Esophoria  Saw Dr. Ham for prism evaluation  MRI first - will call w results    PLAN  Pt prefers to wait for CE OD  MRI  Prism pending MRI      RTC Feb 2022 IOL calcs    Carlyn Addison M.D., M.S.  Department of Ophthalmology   Division of Glaucoma Surgery  [facility]

## 2022-07-12 NOTE — PROGRESS NOTES
Patient is here for follow up of his knee right chondromalacia and arthritis. He is here today for a planned Euflexxa Injection (3/3) (LOT #[id number]). He understands potential risks and benefits of the procedure which includes pseudoseptic reaction and pain. He has failed conservative management to this point for his knee pain including NSAIDS.    Exam findings remain unchanged from most recent visit. No erythema, warmth, or signs of infection.     The patient tolerated the procedure well. We discussed post-injection care of the knee. Plan for continued conservative management as discussed before. All questions were answered.     -Patient requested for PT at [facility] instead of [facility]. New referral placed.     Large Joint Aspiration/Injection: R knee    Date/Time: 7/12/2022 10:15 AM  Performed by: JEREMI King MD  Authorized by: JEREMI King MD     Consent Done?:  Yes (Verbal)  Indications:  Pain  Site marked: the procedure site was marked    Timeout: prior to procedure the correct patient, procedure, and site was verified    Prep: patient was prepped and draped in usual sterile fashion      Details:  Needle Size:  22 G  Ultrasonic Guidance for needle placement?: No    Approach:  Lateral  Location:  Knee  Site:  R knee  Medications:  20 mg sodium hyaluronate (EUFLEXXA) 10 mg/mL(mw 2.4 -3.6 million)  Patient tolerance:  Patient tolerated the procedure well with no immediate complications

## 2022-07-13 ENCOUNTER — LAB VISIT (OUTPATIENT)
Dept: LAB | Facility: HOSPITAL | Age: 72
End: 2022-07-13
Attending: STUDENT IN AN ORGANIZED HEALTH CARE EDUCATION/TRAINING PROGRAM
Payer: COMMERCIAL

## 2022-07-13 ENCOUNTER — OFFICE VISIT (OUTPATIENT)
Dept: OPHTHALMOLOGY | Facility: CLINIC | Age: 72
End: 2022-07-13
Payer: COMMERCIAL

## 2022-07-13 DIAGNOSIS — H25.813 COMBINED FORMS OF AGE-RELATED CATARACT OF BOTH EYES: Primary | ICD-10-CM

## 2022-07-13 DIAGNOSIS — H50.011 ESOTROPIA OF RIGHT EYE: ICD-10-CM

## 2022-07-13 DIAGNOSIS — H53.2 DIPLOPIA: ICD-10-CM

## 2022-07-13 LAB
CREAT SERPL-MCNC: 0.9 MG/DL (ref 0.5–1.4)
EST. GFR  (AFRICAN AMERICAN): >60 ML/MIN/1.73 M^2
EST. GFR  (NON AFRICAN AMERICAN): >60 ML/MIN/1.73 M^2

## 2022-07-13 PROCEDURE — 1126F AMNT PAIN NOTED NONE PRSNT: CPT | Mod: CPTII,S$GLB,, | Performed by: STUDENT IN AN ORGANIZED HEALTH CARE EDUCATION/TRAINING PROGRAM

## 2022-07-13 PROCEDURE — 3288F PR FALLS RISK ASSESSMENT DOCUMENTED: ICD-10-PCS | Mod: CPTII,S$GLB,, | Performed by: STUDENT IN AN ORGANIZED HEALTH CARE EDUCATION/TRAINING PROGRAM

## 2022-07-13 PROCEDURE — 1126F PR PAIN SEVERITY QUANTIFIED, NO PAIN PRESENT: ICD-10-PCS | Mod: CPTII,S$GLB,, | Performed by: STUDENT IN AN ORGANIZED HEALTH CARE EDUCATION/TRAINING PROGRAM

## 2022-07-13 PROCEDURE — 99214 OFFICE O/P EST MOD 30 MIN: CPT | Mod: S$GLB,,, | Performed by: STUDENT IN AN ORGANIZED HEALTH CARE EDUCATION/TRAINING PROGRAM

## 2022-07-13 PROCEDURE — 1160F RVW MEDS BY RX/DR IN RCRD: CPT | Mod: CPTII,S$GLB,, | Performed by: STUDENT IN AN ORGANIZED HEALTH CARE EDUCATION/TRAINING PROGRAM

## 2022-07-13 PROCEDURE — 99999 PR PBB SHADOW E&M-EST. PATIENT-LVL III: ICD-10-PCS | Mod: PBBFAC,,, | Performed by: STUDENT IN AN ORGANIZED HEALTH CARE EDUCATION/TRAINING PROGRAM

## 2022-07-13 PROCEDURE — 99214 PR OFFICE/OUTPT VISIT, EST, LEVL IV, 30-39 MIN: ICD-10-PCS | Mod: S$GLB,,, | Performed by: STUDENT IN AN ORGANIZED HEALTH CARE EDUCATION/TRAINING PROGRAM

## 2022-07-13 PROCEDURE — 3288F FALL RISK ASSESSMENT DOCD: CPT | Mod: CPTII,S$GLB,, | Performed by: STUDENT IN AN ORGANIZED HEALTH CARE EDUCATION/TRAINING PROGRAM

## 2022-07-13 PROCEDURE — 99999 PR PBB SHADOW E&M-EST. PATIENT-LVL III: CPT | Mod: PBBFAC,,, | Performed by: STUDENT IN AN ORGANIZED HEALTH CARE EDUCATION/TRAINING PROGRAM

## 2022-07-13 PROCEDURE — 1160F PR REVIEW ALL MEDS BY PRESCRIBER/CLIN PHARMACIST DOCUMENTED: ICD-10-PCS | Mod: CPTII,S$GLB,, | Performed by: STUDENT IN AN ORGANIZED HEALTH CARE EDUCATION/TRAINING PROGRAM

## 2022-07-13 PROCEDURE — 36415 COLL VENOUS BLD VENIPUNCTURE: CPT | Performed by: STUDENT IN AN ORGANIZED HEALTH CARE EDUCATION/TRAINING PROGRAM

## 2022-07-13 PROCEDURE — 1159F MED LIST DOCD IN RCRD: CPT | Mod: CPTII,S$GLB,, | Performed by: STUDENT IN AN ORGANIZED HEALTH CARE EDUCATION/TRAINING PROGRAM

## 2022-07-13 PROCEDURE — 1159F PR MEDICATION LIST DOCUMENTED IN MEDICAL RECORD: ICD-10-PCS | Mod: CPTII,S$GLB,, | Performed by: STUDENT IN AN ORGANIZED HEALTH CARE EDUCATION/TRAINING PROGRAM

## 2022-07-13 PROCEDURE — 82565 ASSAY OF CREATININE: CPT | Performed by: STUDENT IN AN ORGANIZED HEALTH CARE EDUCATION/TRAINING PROGRAM

## 2022-07-13 PROCEDURE — 1101F PT FALLS ASSESS-DOCD LE1/YR: CPT | Mod: CPTII,S$GLB,, | Performed by: STUDENT IN AN ORGANIZED HEALTH CARE EDUCATION/TRAINING PROGRAM

## 2022-07-13 PROCEDURE — 1101F PR PT FALLS ASSESS DOC 0-1 FALLS W/OUT INJ PAST YR: ICD-10-PCS | Mod: CPTII,S$GLB,, | Performed by: STUDENT IN AN ORGANIZED HEALTH CARE EDUCATION/TRAINING PROGRAM

## 2022-07-13 RX ORDER — CALCIUM CARBONATE/VITAMIN D3 500-10/5ML
LIQUID (ML) ORAL
COMMUNITY
End: 2022-10-27

## 2022-07-13 RX ORDER — TAMARIND SEED/TURMERIC EXTRACT 250 MG
TABLET ORAL
COMMUNITY
End: 2022-10-27

## 2022-08-04 ENCOUNTER — PATIENT MESSAGE (OUTPATIENT)
Dept: OPHTHALMOLOGY | Facility: CLINIC | Age: 72
End: 2022-08-04
Payer: MEDICARE

## 2022-09-03 DIAGNOSIS — N52.9 ERECTILE DYSFUNCTION, UNSPECIFIED ERECTILE DYSFUNCTION TYPE: ICD-10-CM

## 2022-09-03 DIAGNOSIS — R35.1 BENIGN PROSTATIC HYPERPLASIA WITH NOCTURIA: Primary | ICD-10-CM

## 2022-09-03 DIAGNOSIS — N40.1 BENIGN PROSTATIC HYPERPLASIA WITH NOCTURIA: Primary | ICD-10-CM

## 2022-09-04 NOTE — TELEPHONE ENCOUNTER
No new care gaps identified.  [facility] Embedded Care Gaps. Reference number: [id number]. 9/03/2022   8:30:43 PM CDT

## 2022-09-06 RX ORDER — TADALAFIL 5 MG/1
5 TABLET ORAL DAILY PRN
Qty: 90 TABLET | Refills: 1 | Status: SHIPPED | OUTPATIENT
Start: 2022-09-06 | End: 2022-11-22 | Stop reason: SDUPTHER

## 2022-09-06 RX ORDER — LEVOTHYROXINE SODIUM 175 UG/1
175 TABLET ORAL
Qty: 30 TABLET | Refills: 6 | Status: SHIPPED | OUTPATIENT
Start: 2022-09-06 | End: 2023-03-18 | Stop reason: SDUPTHER

## 2022-10-05 ENCOUNTER — PATIENT MESSAGE (OUTPATIENT)
Dept: PRIMARY CARE CLINIC | Facility: CLINIC | Age: 72
End: 2022-10-05
Payer: MEDICARE

## 2022-10-05 DIAGNOSIS — H53.2 DOUBLE VISION: Primary | ICD-10-CM

## 2022-10-05 DIAGNOSIS — R51.9 INTRACTABLE HEADACHE, UNSPECIFIED CHRONICITY PATTERN, UNSPECIFIED HEADACHE TYPE: ICD-10-CM

## 2022-10-06 ENCOUNTER — TELEPHONE (OUTPATIENT)
Dept: NEUROLOGY | Facility: CLINIC | Age: 72
End: 2022-10-06
Payer: MEDICARE

## 2022-10-06 NOTE — TELEPHONE ENCOUNTER
----- Message from Grace Solitario sent at 10/6/2022 12:20 PM CDT -----  Dr. Traci Oconnell has put in a referral for Consult to Neurology. Please assist in scheduling.    Double vision [H53.2]  Intractable headache, unspecified chronicity pattern, unspecified headache type ...    Thanks

## 2022-10-11 ENCOUNTER — PATIENT MESSAGE (OUTPATIENT)
Dept: PRIMARY CARE CLINIC | Facility: CLINIC | Age: 72
End: 2022-10-11
Payer: MEDICARE

## 2022-10-27 ENCOUNTER — OFFICE VISIT (OUTPATIENT)
Dept: NEUROLOGY | Facility: CLINIC | Age: 72
End: 2022-10-27
Payer: COMMERCIAL

## 2022-10-27 VITALS
HEART RATE: 64 BPM | DIASTOLIC BLOOD PRESSURE: 83 MMHG | BODY MASS INDEX: 26.35 KG/M2 | SYSTOLIC BLOOD PRESSURE: 152 MMHG | RESPIRATION RATE: 20 BRPM | WEIGHT: 188.94 LBS

## 2022-10-27 DIAGNOSIS — R51.9 INTRACTABLE HEADACHE, UNSPECIFIED CHRONICITY PATTERN, UNSPECIFIED HEADACHE TYPE: ICD-10-CM

## 2022-10-27 DIAGNOSIS — I25.10 ATHEROSCLEROSIS OF NATIVE CORONARY ARTERY OF NATIVE HEART WITHOUT ANGINA PECTORIS: ICD-10-CM

## 2022-10-27 DIAGNOSIS — E78.00 HYPERCHOLESTEROLEMIA: ICD-10-CM

## 2022-10-27 DIAGNOSIS — M79.18 CERVICAL MYOFASCIAL PAIN SYNDROME: Primary | ICD-10-CM

## 2022-10-27 DIAGNOSIS — H53.2 DOUBLE VISION: ICD-10-CM

## 2022-10-27 PROCEDURE — 1159F PR MEDICATION LIST DOCUMENTED IN MEDICAL RECORD: ICD-10-PCS | Mod: CPTII,S$GLB,, | Performed by: PSYCHIATRY & NEUROLOGY

## 2022-10-27 PROCEDURE — 3079F PR MOST RECENT DIASTOLIC BLOOD PRESSURE 80-89 MM HG: ICD-10-PCS | Mod: CPTII,S$GLB,, | Performed by: PSYCHIATRY & NEUROLOGY

## 2022-10-27 PROCEDURE — 3288F FALL RISK ASSESSMENT DOCD: CPT | Mod: CPTII,S$GLB,, | Performed by: PSYCHIATRY & NEUROLOGY

## 2022-10-27 PROCEDURE — 1159F MED LIST DOCD IN RCRD: CPT | Mod: CPTII,S$GLB,, | Performed by: PSYCHIATRY & NEUROLOGY

## 2022-10-27 PROCEDURE — 99205 OFFICE O/P NEW HI 60 MIN: CPT | Mod: S$GLB,,, | Performed by: PSYCHIATRY & NEUROLOGY

## 2022-10-27 PROCEDURE — 1160F RVW MEDS BY RX/DR IN RCRD: CPT | Mod: CPTII,S$GLB,, | Performed by: PSYCHIATRY & NEUROLOGY

## 2022-10-27 PROCEDURE — 3077F SYST BP >= 140 MM HG: CPT | Mod: CPTII,S$GLB,, | Performed by: PSYCHIATRY & NEUROLOGY

## 2022-10-27 PROCEDURE — 99205 PR OFFICE/OUTPT VISIT, NEW, LEVL V, 60-74 MIN: ICD-10-PCS | Mod: S$GLB,,, | Performed by: PSYCHIATRY & NEUROLOGY

## 2022-10-27 PROCEDURE — 1160F PR REVIEW ALL MEDS BY PRESCRIBER/CLIN PHARMACIST DOCUMENTED: ICD-10-PCS | Mod: CPTII,S$GLB,, | Performed by: PSYCHIATRY & NEUROLOGY

## 2022-10-27 PROCEDURE — 3288F PR FALLS RISK ASSESSMENT DOCUMENTED: ICD-10-PCS | Mod: CPTII,S$GLB,, | Performed by: PSYCHIATRY & NEUROLOGY

## 2022-10-27 PROCEDURE — 3079F DIAST BP 80-89 MM HG: CPT | Mod: CPTII,S$GLB,, | Performed by: PSYCHIATRY & NEUROLOGY

## 2022-10-27 PROCEDURE — 99999 PR PBB SHADOW E&M-EST. PATIENT-LVL V: ICD-10-PCS | Mod: PBBFAC,,, | Performed by: PSYCHIATRY & NEUROLOGY

## 2022-10-27 PROCEDURE — 1101F PR PT FALLS ASSESS DOC 0-1 FALLS W/OUT INJ PAST YR: ICD-10-PCS | Mod: CPTII,S$GLB,, | Performed by: PSYCHIATRY & NEUROLOGY

## 2022-10-27 PROCEDURE — 1125F AMNT PAIN NOTED PAIN PRSNT: CPT | Mod: CPTII,S$GLB,, | Performed by: PSYCHIATRY & NEUROLOGY

## 2022-10-27 PROCEDURE — 3077F PR MOST RECENT SYSTOLIC BLOOD PRESSURE >= 140 MM HG: ICD-10-PCS | Mod: CPTII,S$GLB,, | Performed by: PSYCHIATRY & NEUROLOGY

## 2022-10-27 PROCEDURE — 99999 PR PBB SHADOW E&M-EST. PATIENT-LVL V: CPT | Mod: PBBFAC,,, | Performed by: PSYCHIATRY & NEUROLOGY

## 2022-10-27 PROCEDURE — 1125F PR PAIN SEVERITY QUANTIFIED, PAIN PRESENT: ICD-10-PCS | Mod: CPTII,S$GLB,, | Performed by: PSYCHIATRY & NEUROLOGY

## 2022-10-27 PROCEDURE — 1101F PT FALLS ASSESS-DOCD LE1/YR: CPT | Mod: CPTII,S$GLB,, | Performed by: PSYCHIATRY & NEUROLOGY

## 2022-10-27 RX ORDER — LORAZEPAM 1 MG/1
TABLET ORAL
Qty: 2 TABLET | Refills: 0 | Status: SHIPPED | OUTPATIENT
Start: 2022-10-27 | End: 2023-01-30

## 2022-10-27 RX ORDER — TIZANIDINE 4 MG/1
2-4 TABLET ORAL NIGHTLY PRN
Qty: 30 TABLET | Refills: 3 | Status: SHIPPED | OUTPATIENT
Start: 2022-10-27 | End: 2023-07-10

## 2022-10-27 NOTE — PROGRESS NOTES
[facility]- Headache Clinic    Chief complaint: headache   Referred by: Traci Oconnell MD  [address] [facility] [place] [address]     History of Present Illness:    72 Y RH M with HL, CAD s/p MI, hypothyroidism who presents for initial evaluation of headache. Of note patient is followed by ophthalmology Dr Addison per documentation for monocular diplopia of OD, Rt esophoria, and cataracts. He was sent to get MRI brain. His BP today is 152/83. He eats very strict mediterranean diet and also exercises daily. He is here as he is having the diplopia, but mentions it's binocular as if he covers and eye it resolves and also new neck stiffness/headache after exercising.    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 In spring 2022 he noticed while driving he is having double vision which if he covered any eye it would resolve. He was diagnosed with the cataracts and tells me that Dr. Addison felt diplopia was above that and not related to that. He was sent in 7/22 for MRI as he also was noted to have OD esotropia. He was unable to tolerate the MRI. He is having less diplopia recently, but still having it at some times.  He was on a clinical trial on rapamycin, but was on placebo arm through [facility] thus discontinued. He is currently getting prescribed rapamycin 10mg weekly for prolonged longevity. The diplopia headache preceded the rapamycin.     He mentions that the headache began a few months ago as a stiffness in neck/occipital and will go into frontalis/temporalis area.  The stiffness/tight band feeling in the head 1-3/10 that will happen daily after he exercises and will go away by the afternoon on it's own about 8 hours later. He  is doing weight training at home, he has been exercising for many years, this is not a new routine at home , he will notice the stiffness in neck first and then will to go up to the bitemporal/bifrontal area. Tight band sensation. He denies any migraine symptoms, no autonomic features, no radicular symptoms. There is worsening stiffness on moving the neck.     He mentions that he had headache 7 years ago when leaning forward and getting headache, increase protein intake and they would resolve. He mentions that he had the same recurrence and increased protein (shakes) and it has resolved.     He mentions that if lack of sleep or stress he will have visual aura without the headache - he mentions that he has had this for a long time about 20 years, and rarely happening. It's a visual scotoma.     Location: multiple sides, frontal when leaning forward   Character: tight band, stabbing   Intensity:  1-3/10 , can be 0/10, currently 1/10   Frequency: daily   Worse mid day   Duration: seconds   Aura:  none   Associated symptoms:DENIES photophobia, phonophobia, osmophobia, kinesiophobia, neck tightness/pain, nausea/vomiting  No vision loss  No jaw claudication   No positional component.   Other neurologic symptoms: + nasal congestion, nasal pressure  Precipitating factors: bending forward (this has now resolved)  Alleviating factors: sleep  Aggravating factors: bending over  Family history of headache: sister , father have migraine.   ER/UC visits: 0  Caffeine: 300 to 400mg /day   Sleep: good     Medication history:  Acute:  None     Prevention:   None     MIDAS: 0    Neuroimaging and other studies:   No results found for this or any previous visit.      ROS: The fourteen point review of system was performed.   Constitutional:  Denies fevers/cold or heat intolerance, weight loss/gain or fatigue.  Eyes:                        Denies diplopia, ptosis, visual field defects or ocular disease   excepting any listed  above.  ENT:   +double vision   Cardiovascular:  Denies stroke, CAD, arrhythmia, CHF or other disease excepting any listed above.  Pulmonary:  Denies dyspnea, COPD, RAD or infection or neoplasm excepting any listed above.  Gastrointestinal: Denies ulcer disease, liver or other disease excepting any listed above.  Skin:   Denies rash, skin cancer, or other cutaneous disorder excepting any listed above.  Neurological:  See HPI  Musculoskeletal: +chronic knee pain from torn cartilage in Rt knee   Heme/Lymphatic: Denies any blood or lymph system neoplasm or disorder excepting any listed above.  Endocrine:  Denies any thyroid or other disorders, excepting any listed above.  Allergic/Immuno: Denies any autoimmune disease or allergy excepting any listed above.  Psychiatric:  Denies any disorder or treatment excepting any listed above.  Urologic:  Denies any difficulties with the urinary system or sexual function except noted above.    Past Medical History:   Diagnosis Date    Coronary atherosclerosis     Hypercholesterolemia     Myocardial infarction 2011       Past Surgical History:   Procedure Laterality Date    BLEPHAROPLASTY      COMPLETE LASER PHOTOVAPORIZATION OF PROSTATE      CORONARY STENT PLACEMENT  2011    INGUINAL HERNIA REPAIR      vein           Family History   Problem Relation Age of Onset    Colon cancer Mother 80    Heart attack Father 90       Social history:  Occupation: PhD neuropsychologist   Social History     Tobacco Use    Smoking status: Never    Smokeless tobacco: Never   Substance Use Topics    Alcohol use: Yes     Alcohol/week: 1.0 standard drink     Types: 1 Glasses of wine per week    Drug use: Never     Review of patient's allergies indicates:  No Known Allergies      Current Outpatient Medications:     aspirin (ECOTRIN) 81 MG EC tablet, Take 81 mg by mouth once daily., Disp: , Rfl:     atorvastatin (LIPITOR) 40 MG tablet, Take 1 tablet (40 mg total) by mouth once daily., Disp: 90 tablet,  Rfl: 1    finasteride (PROSCAR) 5 mg tablet, Take 1 tablet (5 mg total) by mouth once daily., Disp: 90 tablet, Rfl: 1    GREEN TEA EXTRACT ORAL, Take 1 capsule by mouth once daily., Disp: , Rfl:     levothyroxine (SYNTHROID, LEVOTHROID) 175 MCG tablet, Take 1 tablet (175 mcg total) by mouth before breakfast., Disp: 30 tablet, Rfl: 6    multivitamin (THERAGRAN) per tablet, Take 1 tablet by mouth once daily., Disp: , Rfl:     omega-3/dha/epa/fish oil (OMEGA-3 FISH OIL ORAL), Take 4 g by mouth once daily., Disp: , Rfl:     QUERCETIN DIHYDRATE, BULK, MISC, by Misc.(Non-Drug; Combo Route) route. Quercetin, Disp: , Rfl:     RESVERATROL ORAL, Take by mouth. Resveratrol (Longevinex formulation), Disp: , Rfl:     rutin/quercetin/bioflav/bilber (BILBERRY EXTRACT ORAL), Take 1 capsule by mouth once daily., Disp: , Rfl:     tadalafiL (CIALIS) 5 MG tablet, Take 1 tablet (5 mg total) by mouth daily as needed for Erectile Dysfunction., Disp: 90 tablet, Rfl: 1    turmeric (CURCUMIN MISC), by Misc.(Non-Drug; Combo Route) route., Disp: , Rfl:     VITAMIN K2 ORAL, , Disp: , Rfl:     PHYSICAL EXAMINATION  Vitals:    10/27/22 0955   BP: (!) 152/83   Pulse: 64   Resp: 20   General: The patient is a well-developed, well-nourished looking of stated age in no acute distress.  Head: Normocephalic, atraumatic  Eyes, ears, nose and throat: normal.  Neck: Supple  Cardiovascular: No carotid bruits.  Regular rate and rhythm.   Limited ROM in neck turning   Pain on palpation of cervical paraspinals and trapezius b/l  NEUROLOGIC EXAM:  MENTAL: The patient is awake, alert and oriented times to time, place, location and situation. Intact recent memory, attention, concentration. Language and speech are normal. No aphasia, no dysarthria  CRANIAL NERVES:   CN II: visual fields are intact to confrontation with no defects;   Pupils are equal, round and reactive to light and accommodation.    III, IV and VI: extraocular movements are intact to all  directions of gaze with normal convergence.  V: facial sensation is intact to light touch in divisions V1 through V3.    VII: Facial muscle strength is intact to eyebrow raising, forceful eyelid closure, and cheek puffing.    VIII: Hearing acuity is intact to finger rub.  IX, X: palate rises symmetrically and uvula midline.    XI: Sternocleidomastoid and trapezius strength are 5/5 bilaterally.    XII: Tongue protrudes midline without atrophy or fasciculations.   MOTOR EXAM: No atrophy or fasciculations are present. No pronator drift,  shows normal strength ( 5/5 strength )in all 4 extremities. Tone is normal.   SENSORY EXAM: intact  light touch, vibration bilaterally.  CEREBELLAR EXAM: shows normal finger-to-nose and heel-to-shin.   DEEP TENDON REFLEXES:  +2 in brachioradialis, biceps, triceps, 2+ brisk knee jerks, no crossed adductors, 2+ ankle jerks, downgoing toes b/l. No Hinojosa's no ankle clonus  GAIT/STANCE: Romberg Negative.  Standard gait was normal with normal stride, arm swing and turning.  Normal  tandem gait.       Impression:  Diplopia binocular - that began in spring 2022 (he has known cataracts with blurry vision fromt hat on OD and great vision on OS), notest from ophthalmology report monocular diplopia on OD, but patient reports that covering one eye would resolve it, specifically discussed if covered onlyleft eye with Rt eye vision would not have diplopia this was more noticeable with driving, this has slowly improved the last month and much better. He was sent for MRI brain w/wo contraset, but unable to tolerate procedure due to procedural anxiety. There is note of Rt esotropia that I did not see, no cranial nerve palsies, pupils intact, VFFTC intact. Discussed unclear etiology , concern for an ischemic event given his known CAD and vascular risk factors. We need neuroimaging thus will give ativan so that he can tolerate procedure. He is in agreement    Cervical myofascial pain - patient has been  having for months now mild nondisabling headache after doing weight training in [place], stiffness that radiates to become bitemporal/bifrontal, on examination limited ROM with reproduction of symptoms on palpation of cervical/trapezius musculature. Will send to PT for strenghtening, stretching and consideration of dry needling. Otherwise no signs of radiculopathy/myelopathy on exam. Will provide trial of tizanidine discussed side effect profile. He is allergic to celebrex.     Comorbidities:  Hypothyroidism - on management  HL - on statin   CAD s/p MI- on ASA   BPH- managed by urology   Rt knee pain - has specialist for this.     Plan:   1- MRI brain w/wo contrast for the diplopia ,take ativan 1mg 30 minutes prior to MRI , can repeat if needed.     2- for cervical myofascial pain - will refer to physical therapy     3 - Trial of tizandine 2mg to 4 nightly as needed for spasms in the cervical musculature/trapezius.       RTC in 2-3 months. Care will be transferred to my colleagues in headache clinic upon my departure from [facility]. Patient expressed understanding.           Kya Gregg MD   Board Certified Neurologist  [facility] Certified Headache Medicine     I spent  60  minutes on day of this encounter preparing, treating, and managing this patient with diplopa, cervical myofascial pain

## 2022-10-27 NOTE — PATIENT INSTRUCTIONS
1- MRI brain w/wo contrast for the diplopia, take ativan 1mg 30 minutes prior to MRI , can repeat if needed.     2- for cervical myofascial pain - will refer to physical therapy     3 - Trial of tizandine 2mg to 4 nightly as needed for spasms in the cervical musculature/trapezius.

## 2022-11-15 PROBLEM — I25.2 HX OF MYOCARDIAL INFARCTION: Status: ACTIVE | Noted: 2022-11-15

## 2022-11-15 NOTE — PROGRESS NOTES
Thiago Thacker  1950        Subjective     Chief Complaint: Est Care    History of Present Illness:  Mr. Thiago Thacker is a 72 y.o. male who presents to clinic for est care. Was seeing Dr. Oconnell.     Enrolled in trial-Sirolimus 10 mg weekly for a clinical trial for aging. Planning on stopping the study because he was given placebo but wants to continue on the med through a private company. I am not a part or in any way associated with this trial/prescribing this medication.     Hypothyroid- on Synthroid 175 mcg Q AM without food.     BP well-controlled off meds.     Headaches- sess neurology. Working up for possible ischemic event causes vision changes. Imaging pending. On trial of zanaflex for muscle pain/cervical myofacial. Also thinks they improved with increased protein in diet. Works out daily, also changed type of exercise he was doing. Double vision now improved as well.     HLD- on atorvastatin 40 mg and ASA. Sees Cardiology.     Past MI-Around 2011, the patient had a myocardial infarction while in [facility].  He had a drug-eluting stent inflation at [facility].  Follows Mediterranean diet strictly. Does not drink.     Low platelets- last done 6m ago, 113.     BPH- on tadalafil    Mom with colon cancer. Due for colonoscopy in 2/2023. Done in 2018 Q5 yrs, done in [facility].     Also has cardiac events on both side of the family.     Review of Systems   Constitutional:  Negative for chills and fever.   HENT:  Negative for congestion and sore throat.    Eyes:  Negative for blurred vision.   Respiratory:  Negative for cough and shortness of breath.    Cardiovascular:  Negative for chest pain and leg swelling.   Gastrointestinal:  Negative for abdominal pain, diarrhea, nausea and vomiting.   Musculoskeletal:  Positive for joint pain.   Neurological:  Negative for sensory change and speech change.      PAST HISTORY:     Past Medical History:   Diagnosis Date    Coronary atherosclerosis      Hypercholesterolemia     Myocardial infarction 2011       Past Surgical History:   Procedure Laterality Date    BLEPHAROPLASTY      COMPLETE LASER PHOTOVAPORIZATION OF PROSTATE      CORONARY STENT PLACEMENT  2011    INGUINAL HERNIA REPAIR      vein         Family History   Problem Relation Age of Onset    Colon cancer Mother 80    Heart attack Father 90       Social History     Socioeconomic History    Marital status: Single   Tobacco Use    Smoking status: Never    Smokeless tobacco: Never   Substance and Sexual Activity    Alcohol use: Not Currently    Drug use: Never    Sexual activity: Not Currently     Partners: Female       MEDICATIONS & ALLERGIES:     Current Outpatient Medications on File Prior to Visit   Medication Sig    aspirin (ECOTRIN) 81 MG EC tablet Take 81 mg by mouth once daily.    atorvastatin (LIPITOR) 40 MG tablet Take 1 tablet (40 mg total) by mouth once daily.    COLLAGEN,HYDROLYZ-ASCORBATE CA ORAL     finasteride (PROSCAR) 5 mg tablet Take 1 tablet (5 mg total) by mouth once daily.    GREEN TEA EXTRACT ORAL Take 1 capsule by mouth once daily.    levothyroxine (SYNTHROID, LEVOTHROID) 175 MCG tablet Take 1 tablet (175 mcg total) by mouth before breakfast.    LORazepam (ATIVAN) 1 MG tablet 1 tab po PRN 30 minutes prior to MRI, can repeat if necessary    mirabegron (MYRBETRIQ) 50 mg Tb24     multivitamin (THERAGRAN) per tablet Take 1 tablet by mouth once daily.    omega-3/dha/epa/fish oil (OMEGA-3 FISH OIL ORAL) Take 4 g by mouth once daily.    QUERCETIN DIHYDRATE, BULK, MISC by Misc.(Non-Drug; Combo Route) route. Quercetin    RESVERATROL ORAL Take by mouth. Resveratrol (Longevinex formulation)    rutin/quercetin/bioflav/bilber (BILBERRY EXTRACT ORAL) Take 1 capsule by mouth once daily.    sirolimus (RAPAMUNE) 1 MG Tab     tadalafiL (CIALIS) 5 MG tablet Take 1 tablet (5 mg total) by mouth daily as needed for Erectile Dysfunction.    tiZANidine (ZANAFLEX) 4 MG tablet Take 0.5-1 tablets (2-4 mg  "total) by mouth nightly as needed (muscular spasm).    turmeric (CURCUMIN MISC) by Misc.(Non-Drug; Combo Route) route.    UNABLE TO FIND Fisetin    VITAMIN K2 ORAL      No current facility-administered medications on file prior to visit.       Review of patient's allergies indicates:   Allergen Reactions    Celebrex [celecoxib] Rash       OBJECTIVE:     Vital Signs:  Vitals:    11/17/22 0931   BP: 128/72   BP Location: Right arm   Patient Position: Sitting   BP Method: Medium (Manual)   Pulse: 62   Temp: 97.5 °F (36.4 °C)   TempSrc: Oral   SpO2: 97%   Weight: 86.6 kg (190 lb 14.7 oz)   Height: 5' 11" (1.803 m)       Body mass index is 26.63 kg/m².     Physical Exam:  Physical Exam  Vitals and nursing note reviewed.   Constitutional:       General: He is not in acute distress.     Appearance: Normal appearance. He is not ill-appearing, toxic-appearing or diaphoretic.   HENT:      Head: Normocephalic and atraumatic.   Eyes:      General: No scleral icterus.  Cardiovascular:      Rate and Rhythm: Normal rate and regular rhythm.      Heart sounds: Murmur heard.   Pulmonary:      Effort: Pulmonary effort is normal.      Breath sounds: Normal breath sounds. No wheezing.   Musculoskeletal:         General: Normal range of motion.      Cervical back: Normal range of motion.      Right lower leg: No edema.      Left lower leg: No edema.   Skin:     General: Skin is warm and dry.   Neurological:      General: No focal deficit present.      Mental Status: He is alert and oriented to person, place, and time.   Psychiatric:         Mood and Affect: Mood and affect normal.         Behavior: Behavior normal.          Laboratory  Lab Results   Component Value Date    WBC 3.97 01/25/2022    HGB 14.8 01/25/2022    HCT 44.8 01/25/2022    MCV 97 01/25/2022     (L) 01/25/2022     Lab Results   Component Value Date    GLU 98 01/25/2022     01/25/2022    K 4.1 01/25/2022     01/25/2022    CO2 26 01/25/2022    BUN 25 (H) " 01/25/2022    CREATININE 0.9 07/13/2022    CALCIUM 9.5 01/25/2022     No results found for: INR, PROTIME  No results found for: HGBA1C        Health Maintenance         Date Due Completion Date    TETANUS VACCINE Never done ---    Colorectal Cancer Screening Never done ---    Shingles Vaccine (1 of 2) Never done ---    Pneumococcal Vaccines (Age 65+) (1 - PCV) Never done ---    COVID-19 Vaccine (5 - Booster for Pfizer series) 05/31/2022 4/5/2022    Aspirin/Antiplatelet Therapy 10/27/2023 10/27/2022    Lipid Panel 01/25/2027 1/25/2022              ASSESSMENT & PLAN:   Mr. Thiago Thacker is a 72 y.o. male who was seen today in clinic for est care.      Enrolled in private trial using Sirolimus 10 mg weekly for  aging. Planning on stopping the study because he was given placebo but wants to continue on the med through a private company. I am not a part or in any way associated with this trial/prescribing this medication. I have no way to monitor this or predict what interactions/side effects/long term consequences of this medication are.       1. Thrombocytopenia  - Denies bruising/bleeding  -     CBC Auto Differential; Future; Expected date: 11/17/2022  -     COPPER, SERUM; Future; Expected date: 11/17/2022  -     FOLATE; Future; Expected date: 11/17/2022  -     Pathologist Interpretation Differential; Future; Expected date: 11/17/2022  -     VITAMIN B12; Future; Expected date: 11/17/2022    2. Benign prostatic hyperplasia with nocturia  -Stable    3. Hypercholesterolemia  -Lipids great, continue statin    4. Atherosclerosis of native coronary artery of native heart without angina pectoris  7. Hx of myocardial infarction  -on asa and statin, doing well, sees cards    5. Hypothyroidism, unspecified type  -Continue current meds, TSH checked 6m ago normal    6. Osteoarthritis of right knee, unspecified osteoarthritis type  -Stable    8. Colon cancer screening  -     Case Request Endoscopy: COLONOSCOPY         RTC in 6m  or sooner if needed.    Kristyn Reyes MD  Internal Medicine

## 2022-11-17 ENCOUNTER — OFFICE VISIT (OUTPATIENT)
Dept: PRIMARY CARE CLINIC | Facility: CLINIC | Age: 72
End: 2022-11-17
Payer: COMMERCIAL

## 2022-11-17 VITALS
BODY MASS INDEX: 26.73 KG/M2 | WEIGHT: 190.94 LBS | OXYGEN SATURATION: 97 % | TEMPERATURE: 98 F | SYSTOLIC BLOOD PRESSURE: 128 MMHG | DIASTOLIC BLOOD PRESSURE: 72 MMHG | HEART RATE: 62 BPM | HEIGHT: 71 IN

## 2022-11-17 DIAGNOSIS — D69.6 THROMBOCYTOPENIA: Primary | ICD-10-CM

## 2022-11-17 DIAGNOSIS — Z12.11 COLON CANCER SCREENING: ICD-10-CM

## 2022-11-17 DIAGNOSIS — I25.10 ATHEROSCLEROSIS OF NATIVE CORONARY ARTERY OF NATIVE HEART WITHOUT ANGINA PECTORIS: ICD-10-CM

## 2022-11-17 DIAGNOSIS — N40.1 BENIGN PROSTATIC HYPERPLASIA WITH NOCTURIA: ICD-10-CM

## 2022-11-17 DIAGNOSIS — I25.2 HX OF MYOCARDIAL INFARCTION: ICD-10-CM

## 2022-11-17 DIAGNOSIS — E03.9 HYPOTHYROIDISM, UNSPECIFIED TYPE: ICD-10-CM

## 2022-11-17 DIAGNOSIS — M17.11 OSTEOARTHRITIS OF RIGHT KNEE, UNSPECIFIED OSTEOARTHRITIS TYPE: ICD-10-CM

## 2022-11-17 DIAGNOSIS — E78.00 HYPERCHOLESTEROLEMIA: ICD-10-CM

## 2022-11-17 DIAGNOSIS — R35.1 BENIGN PROSTATIC HYPERPLASIA WITH NOCTURIA: ICD-10-CM

## 2022-11-17 PROCEDURE — 3008F PR BODY MASS INDEX (BMI) DOCUMENTED: ICD-10-PCS | Mod: CPTII,S$GLB,, | Performed by: STUDENT IN AN ORGANIZED HEALTH CARE EDUCATION/TRAINING PROGRAM

## 2022-11-17 PROCEDURE — 99213 PR OFFICE/OUTPT VISIT, EST, LEVL III, 20-29 MIN: ICD-10-PCS | Mod: S$GLB,,, | Performed by: STUDENT IN AN ORGANIZED HEALTH CARE EDUCATION/TRAINING PROGRAM

## 2022-11-17 PROCEDURE — 1126F AMNT PAIN NOTED NONE PRSNT: CPT | Mod: CPTII,S$GLB,, | Performed by: STUDENT IN AN ORGANIZED HEALTH CARE EDUCATION/TRAINING PROGRAM

## 2022-11-17 PROCEDURE — 1126F PR PAIN SEVERITY QUANTIFIED, NO PAIN PRESENT: ICD-10-PCS | Mod: CPTII,S$GLB,, | Performed by: STUDENT IN AN ORGANIZED HEALTH CARE EDUCATION/TRAINING PROGRAM

## 2022-11-17 PROCEDURE — 1159F MED LIST DOCD IN RCRD: CPT | Mod: CPTII,S$GLB,, | Performed by: STUDENT IN AN ORGANIZED HEALTH CARE EDUCATION/TRAINING PROGRAM

## 2022-11-17 PROCEDURE — 1101F PR PT FALLS ASSESS DOC 0-1 FALLS W/OUT INJ PAST YR: ICD-10-PCS | Mod: CPTII,S$GLB,, | Performed by: STUDENT IN AN ORGANIZED HEALTH CARE EDUCATION/TRAINING PROGRAM

## 2022-11-17 PROCEDURE — 99999 PR PBB SHADOW E&M-EST. PATIENT-LVL V: ICD-10-PCS | Mod: PBBFAC,,, | Performed by: STUDENT IN AN ORGANIZED HEALTH CARE EDUCATION/TRAINING PROGRAM

## 2022-11-17 PROCEDURE — 1160F RVW MEDS BY RX/DR IN RCRD: CPT | Mod: CPTII,S$GLB,, | Performed by: STUDENT IN AN ORGANIZED HEALTH CARE EDUCATION/TRAINING PROGRAM

## 2022-11-17 PROCEDURE — 99213 OFFICE O/P EST LOW 20 MIN: CPT | Mod: S$GLB,,, | Performed by: STUDENT IN AN ORGANIZED HEALTH CARE EDUCATION/TRAINING PROGRAM

## 2022-11-17 PROCEDURE — 99999 PR PBB SHADOW E&M-EST. PATIENT-LVL V: CPT | Mod: PBBFAC,,, | Performed by: STUDENT IN AN ORGANIZED HEALTH CARE EDUCATION/TRAINING PROGRAM

## 2022-11-17 PROCEDURE — 3008F BODY MASS INDEX DOCD: CPT | Mod: CPTII,S$GLB,, | Performed by: STUDENT IN AN ORGANIZED HEALTH CARE EDUCATION/TRAINING PROGRAM

## 2022-11-17 PROCEDURE — 3078F DIAST BP <80 MM HG: CPT | Mod: CPTII,S$GLB,, | Performed by: STUDENT IN AN ORGANIZED HEALTH CARE EDUCATION/TRAINING PROGRAM

## 2022-11-17 PROCEDURE — 1159F PR MEDICATION LIST DOCUMENTED IN MEDICAL RECORD: ICD-10-PCS | Mod: CPTII,S$GLB,, | Performed by: STUDENT IN AN ORGANIZED HEALTH CARE EDUCATION/TRAINING PROGRAM

## 2022-11-17 PROCEDURE — 3288F FALL RISK ASSESSMENT DOCD: CPT | Mod: CPTII,S$GLB,, | Performed by: STUDENT IN AN ORGANIZED HEALTH CARE EDUCATION/TRAINING PROGRAM

## 2022-11-17 PROCEDURE — 3078F PR MOST RECENT DIASTOLIC BLOOD PRESSURE < 80 MM HG: ICD-10-PCS | Mod: CPTII,S$GLB,, | Performed by: STUDENT IN AN ORGANIZED HEALTH CARE EDUCATION/TRAINING PROGRAM

## 2022-11-17 PROCEDURE — 3074F SYST BP LT 130 MM HG: CPT | Mod: CPTII,S$GLB,, | Performed by: STUDENT IN AN ORGANIZED HEALTH CARE EDUCATION/TRAINING PROGRAM

## 2022-11-17 PROCEDURE — 3288F PR FALLS RISK ASSESSMENT DOCUMENTED: ICD-10-PCS | Mod: CPTII,S$GLB,, | Performed by: STUDENT IN AN ORGANIZED HEALTH CARE EDUCATION/TRAINING PROGRAM

## 2022-11-17 PROCEDURE — 3074F PR MOST RECENT SYSTOLIC BLOOD PRESSURE < 130 MM HG: ICD-10-PCS | Mod: CPTII,S$GLB,, | Performed by: STUDENT IN AN ORGANIZED HEALTH CARE EDUCATION/TRAINING PROGRAM

## 2022-11-17 PROCEDURE — 1101F PT FALLS ASSESS-DOCD LE1/YR: CPT | Mod: CPTII,S$GLB,, | Performed by: STUDENT IN AN ORGANIZED HEALTH CARE EDUCATION/TRAINING PROGRAM

## 2022-11-17 PROCEDURE — 1160F PR REVIEW ALL MEDS BY PRESCRIBER/CLIN PHARMACIST DOCUMENTED: ICD-10-PCS | Mod: CPTII,S$GLB,, | Performed by: STUDENT IN AN ORGANIZED HEALTH CARE EDUCATION/TRAINING PROGRAM

## 2022-11-17 RX ORDER — SIROLIMUS 1 MG/1
10 TABLET, FILM COATED ORAL WEEKLY
COMMUNITY
Start: 2022-10-01

## 2022-11-17 RX ORDER — MIRABEGRON 50 MG/1
TABLET, FILM COATED, EXTENDED RELEASE ORAL
COMMUNITY
Start: 2022-09-19 | End: 2023-07-10

## 2022-11-26 DIAGNOSIS — Z12.11 ENCOUNTER FOR SCREENING COLONOSCOPY FOR NON-HIGH-RISK PATIENT: Primary | ICD-10-CM

## 2022-12-01 ENCOUNTER — HOSPITAL ENCOUNTER (OUTPATIENT)
Dept: RADIOLOGY | Facility: HOSPITAL | Age: 72
Discharge: HOME | End: 2022-12-01
Attending: STUDENT IN AN ORGANIZED HEALTH CARE EDUCATION/TRAINING PROGRAM
Payer: COMMERCIAL

## 2022-12-01 LAB
BASOPHILS # BLD AUTO: 29 CELLS/UL (ref 0–200)
BASOPHILS NFR BLD AUTO: 0.7 %
COPPER SERPL-MCNC: 106 MCG/DL (ref 70–175)
EOSINOPHIL # BLD AUTO: 50 CELLS/UL (ref 15–500)
EOSINOPHIL NFR BLD AUTO: 1.2 %
ERYTHROCYTE [DISTWIDTH] IN BLOOD BY AUTOMATED COUNT: 12.6 % (ref 11–15)
FOLATE SERPL-MCNC: >24 NG/ML
HCT VFR BLD AUTO: 42.8 % (ref 38.5–50)
HGB BLD-MCNC: 14.4 G/DL (ref 13.2–17.1)
LYMPHOCYTES # BLD AUTO: 668 CELLS/UL (ref 850–3900)
LYMPHOCYTES NFR BLD AUTO: 15.9 %
MCH RBC QN AUTO: 31.2 PG (ref 27–33)
MCHC RBC AUTO-ENTMCNC: 33.6 G/DL (ref 32–36)
MCV RBC AUTO: 92.8 FL (ref 80–100)
MONOCYTES # BLD AUTO: 340 CELLS/UL (ref 200–950)
MONOCYTES NFR BLD AUTO: 8.1 %
NEUTROPHILS # BLD AUTO: 3112 CELLS/UL (ref 1500–7800)
NEUTROPHILS NFR BLD AUTO: 74.1 %
PATH REPORT.COMMENTS IMP SPEC: NORMAL
PATH REPORT.FINAL DX SPEC: NORMAL
PATH REV BLD -IMP: NORMAL
PATHOLOGIST NAME: NORMAL
PLATELET # BLD AUTO: 172 THOUSAND/UL (ref 140–400)
PMV BLD REES-ECKER: 10.6 FL (ref 7.5–12.5)
RBC # BLD AUTO: 4.61 MILLION/UL (ref 4.2–5.8)
SPECIMEN SOURCE: NORMAL
VIT B12 SERPL-MCNC: 718 PG/ML (ref 200–1100)
WBC # BLD AUTO: 4.2 THOUSAND/UL (ref 3.8–10.8)

## 2022-12-01 PROCEDURE — 70553 MRI BRAIN W WO CONTRAST: ICD-10-PCS | Mod: 26,,, | Performed by: RADIOLOGY

## 2022-12-01 PROCEDURE — A9585 GADOBUTROL INJECTION: HCPCS | Performed by: STUDENT IN AN ORGANIZED HEALTH CARE EDUCATION/TRAINING PROGRAM

## 2022-12-01 PROCEDURE — 70553 MRI BRAIN STEM W/O & W/DYE: CPT | Mod: TC

## 2022-12-01 PROCEDURE — 25500020 PHARM REV CODE 255: Performed by: STUDENT IN AN ORGANIZED HEALTH CARE EDUCATION/TRAINING PROGRAM

## 2022-12-01 PROCEDURE — 70553 MRI BRAIN STEM W/O & W/DYE: CPT | Mod: 26,,, | Performed by: RADIOLOGY

## 2022-12-01 RX ORDER — GADOBUTROL 604.72 MG/ML
10 INJECTION INTRAVENOUS
Status: COMPLETED | OUTPATIENT
Start: 2022-12-01 | End: 2022-12-01

## 2022-12-01 RX ADMIN — GADOBUTROL 10 ML: 604.72 INJECTION INTRAVENOUS at 10:12

## 2023-01-30 ENCOUNTER — OFFICE VISIT (OUTPATIENT)
Dept: NEUROLOGY | Facility: CLINIC | Age: 73
End: 2023-01-30
Payer: COMMERCIAL

## 2023-01-30 VITALS
DIASTOLIC BLOOD PRESSURE: 90 MMHG | RESPIRATION RATE: 17 BRPM | HEIGHT: 71 IN | WEIGHT: 191.56 LBS | BODY MASS INDEX: 26.82 KG/M2 | HEART RATE: 71 BPM | SYSTOLIC BLOOD PRESSURE: 167 MMHG

## 2023-01-30 DIAGNOSIS — M79.18 CERVICAL MYOFASCIAL PAIN SYNDROME: Primary | ICD-10-CM

## 2023-01-30 DIAGNOSIS — R35.1 NOCTURIA: ICD-10-CM

## 2023-01-30 DIAGNOSIS — R06.83 SNORING: ICD-10-CM

## 2023-01-30 PROCEDURE — 3080F PR MOST RECENT DIASTOLIC BLOOD PRESSURE >= 90 MM HG: ICD-10-PCS | Mod: CPTII,S$GLB,, | Performed by: PSYCHIATRY & NEUROLOGY

## 2023-01-30 PROCEDURE — 1101F PR PT FALLS ASSESS DOC 0-1 FALLS W/OUT INJ PAST YR: ICD-10-PCS | Mod: CPTII,S$GLB,, | Performed by: PSYCHIATRY & NEUROLOGY

## 2023-01-30 PROCEDURE — 1160F PR REVIEW ALL MEDS BY PRESCRIBER/CLIN PHARMACIST DOCUMENTED: ICD-10-PCS | Mod: CPTII,S$GLB,, | Performed by: PSYCHIATRY & NEUROLOGY

## 2023-01-30 PROCEDURE — 1159F PR MEDICATION LIST DOCUMENTED IN MEDICAL RECORD: ICD-10-PCS | Mod: CPTII,S$GLB,, | Performed by: PSYCHIATRY & NEUROLOGY

## 2023-01-30 PROCEDURE — 3288F PR FALLS RISK ASSESSMENT DOCUMENTED: ICD-10-PCS | Mod: CPTII,S$GLB,, | Performed by: PSYCHIATRY & NEUROLOGY

## 2023-01-30 PROCEDURE — 99999 PR PBB SHADOW E&M-EST. PATIENT-LVL IV: CPT | Mod: PBBFAC,,, | Performed by: PSYCHIATRY & NEUROLOGY

## 2023-01-30 PROCEDURE — 1160F RVW MEDS BY RX/DR IN RCRD: CPT | Mod: CPTII,S$GLB,, | Performed by: PSYCHIATRY & NEUROLOGY

## 2023-01-30 PROCEDURE — 1101F PT FALLS ASSESS-DOCD LE1/YR: CPT | Mod: CPTII,S$GLB,, | Performed by: PSYCHIATRY & NEUROLOGY

## 2023-01-30 PROCEDURE — 3080F DIAST BP >= 90 MM HG: CPT | Mod: CPTII,S$GLB,, | Performed by: PSYCHIATRY & NEUROLOGY

## 2023-01-30 PROCEDURE — 3077F SYST BP >= 140 MM HG: CPT | Mod: CPTII,S$GLB,, | Performed by: PSYCHIATRY & NEUROLOGY

## 2023-01-30 PROCEDURE — 99215 PR OFFICE/OUTPT VISIT, EST, LEVL V, 40-54 MIN: ICD-10-PCS | Mod: S$GLB,,, | Performed by: PSYCHIATRY & NEUROLOGY

## 2023-01-30 PROCEDURE — 1159F MED LIST DOCD IN RCRD: CPT | Mod: CPTII,S$GLB,, | Performed by: PSYCHIATRY & NEUROLOGY

## 2023-01-30 PROCEDURE — 3288F FALL RISK ASSESSMENT DOCD: CPT | Mod: CPTII,S$GLB,, | Performed by: PSYCHIATRY & NEUROLOGY

## 2023-01-30 PROCEDURE — 99999 PR PBB SHADOW E&M-EST. PATIENT-LVL IV: ICD-10-PCS | Mod: PBBFAC,,, | Performed by: PSYCHIATRY & NEUROLOGY

## 2023-01-30 PROCEDURE — 1125F PR PAIN SEVERITY QUANTIFIED, PAIN PRESENT: ICD-10-PCS | Mod: CPTII,S$GLB,, | Performed by: PSYCHIATRY & NEUROLOGY

## 2023-01-30 PROCEDURE — 3008F PR BODY MASS INDEX (BMI) DOCUMENTED: ICD-10-PCS | Mod: CPTII,S$GLB,, | Performed by: PSYCHIATRY & NEUROLOGY

## 2023-01-30 PROCEDURE — 1125F AMNT PAIN NOTED PAIN PRSNT: CPT | Mod: CPTII,S$GLB,, | Performed by: PSYCHIATRY & NEUROLOGY

## 2023-01-30 PROCEDURE — 3008F BODY MASS INDEX DOCD: CPT | Mod: CPTII,S$GLB,, | Performed by: PSYCHIATRY & NEUROLOGY

## 2023-01-30 PROCEDURE — 99215 OFFICE O/P EST HI 40 MIN: CPT | Mod: S$GLB,,, | Performed by: PSYCHIATRY & NEUROLOGY

## 2023-01-30 PROCEDURE — 3077F PR MOST RECENT SYSTOLIC BLOOD PRESSURE >= 140 MM HG: ICD-10-PCS | Mod: CPTII,S$GLB,, | Performed by: PSYCHIATRY & NEUROLOGY

## 2023-01-30 NOTE — PROGRESS NOTES
[facility]    Chief complaint: headache   Referred by: No referring provider defined for this encounter.     Interval History 01/30/2023:  Diplopia has completely resolved. There are still headaches that rise up from shoulders to head, and will feel pain in the forward lean. Has a bit of headache now which has 3/10. Has headache daily, takes zanaflex 4mg 3x/week. Has a snoring eliana on phone and notes he does snore and sleep talks. He maintains daily exercise, mediterranean diet, and overall health conscious.      History of Present Illness:    73 Y RH M with HL, CAD s/p MI, hypothyroidism who presents for initial evaluation of headache. Of note patient is followed by ophthalmology Dr Addison per documentation for monocular diplopia of OD, Rt esophoria, and cataracts. He was sent to get MRI brain. His BP today is 152/83. He eats very strict mediterranean diet and also exercises daily. He is here as he is having the diplopia, but mentions it's binocular as if he covers and eye it resolves and also new neck stiffness/headache after exercising.    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 In spring 2022 he noticed while driving he is having double vision which if he covered any eye it would resolve. He was diagnosed with the cataracts and tells me that Dr. Addison felt diplopia was above that and not related to that. He was sent in 7/22 for MRI as he also was noted to have OD esotropia. He was unable to tolerate the MRI. He is having less diplopia recently, but still having it at some times.  He was on a clinical trial on rapamycin, but was on placebo arm through [facility] thus discontinued. He is currently getting  prescribed rapamycin 10mg weekly for prolonged longevity. The diplopia headache preceded the rapamycin.     He mentions that the headache began a few months ago as a stiffness in neck/occipital and will go into frontalis/temporalis area.  The stiffness/tight band feeling in the head 1-3/10 that will happen daily after he exercises and will go away by the afternoon on it's own about 8 hours later. He is doing weight training at home, he has been exercising for many years, this is not a new routine at home , he will notice the stiffness in neck first and then will to go up to the bitemporal/bifrontal area. Tight band sensation. He denies any migraine symptoms, no autonomic features, no radicular symptoms. There is worsening stiffness on moving the neck.     He mentions that he had headache 7 years ago when leaning forward and getting headache, increase protein intake and they would resolve. He mentions that he had the same recurrence and increased protein (shakes) and it has resolved.     He mentions that if lack of sleep or stress he will have visual aura without the headache - he mentions that he has had this for a long time about 20 years, and rarely happening. It's a visual scotoma.     Location: multiple sides, frontal when leaning forward   Character: tight band, stabbing   Intensity:  1-3/10 , can be 0/10, currently 1/10   Frequency: daily   Worse mid day   Duration: seconds   Aura:  none   Associated symptoms:DENIES photophobia, phonophobia, osmophobia, kinesiophobia, neck tightness/pain, nausea/vomiting  No vision loss  No jaw claudication   No positional component.   Other neurologic symptoms: + nasal congestion, nasal pressure  Precipitating factors: bending forward (this has now resolved)  Alleviating factors: sleep  Aggravating factors: bending over  Family history of headache: sister , father have migraine.   ER/UC visits: 0  Caffeine: 300 to 400mg /day   Sleep: good     Medication history:  Acute:  None      Prevention:   None     MIDAS: 0    Neuroimaging and other studies:   No results found for this or any previous visit.      ROS: The fourteen point review of system was performed.   Constitutional:  Denies fevers/cold or heat intolerance, weight loss/gain or fatigue.  Eyes:                        Resolved. No diplopia since late September; Denies ptosis, visual field defects or ocular disease   excepting any listed above.  ENT:   Rhinorrhea. Denies congestion.   Cardiovascular:  Denies stroke, CAD, arrhythmia, CHF or other disease excepting any listed above.  Pulmonary:  Denies dyspnea, COPD, RAD or infection or neoplasm excepting any listed above.  Gastrointestinal: Denies ulcer disease, liver or other disease excepting any listed above.  Skin:   Denies rash, skin cancer, or other cutaneous disorder excepting any listed above.  Neurological:  See HPI  Musculoskeletal: Improved chronic knee pain from torn cartilage in Rt knee   Heme/Lymphatic: Denies any blood or lymph system neoplasm or disorder excepting any listed above.  Endocrine:  Denies any thyroid or other disorders, excepting any listed above.  Allergic/Immuno: Denies any autoimmune disease or allergy excepting any listed above.  Psychiatric:  Denies any disorder or treatment excepting any listed above.  Urologic:  Denies any difficulties with the urinary system or sexual function except noted above.    Past Medical History:   Diagnosis Date    Coronary atherosclerosis     Headache     Hypercholesterolemia     Myocardial infarction 2011       Past Surgical History:   Procedure Laterality Date    BLEPHAROPLASTY      COMPLETE LASER PHOTOVAPORIZATION OF PROSTATE      CORONARY STENT PLACEMENT  2011    INGUINAL HERNIA REPAIR      vein           Family History   Problem Relation Age of Onset    Colon cancer Mother 80    Heart attack Father 90       Social history:  Occupation: PhD neuropsychologist   Social History     Tobacco Use    Smoking status: Never     Smokeless tobacco: Never   Substance Use Topics    Alcohol use: Not Currently    Drug use: Never     Review of patient's allergies indicates:   Allergen Reactions    Celebrex [celecoxib] Rash         Current Outpatient Medications:     aspirin (ECOTRIN) 81 MG EC tablet, Take 81 mg by mouth once daily., Disp: , Rfl:     atorvastatin (LIPITOR) 40 MG tablet, Take 1 tablet (40 mg total) by mouth once daily., Disp: 90 tablet, Rfl: 1    finasteride (PROSCAR) 5 mg tablet, Take 1 tablet (5 mg total) by mouth once daily., Disp: 90 tablet, Rfl: 1    GREEN TEA EXTRACT ORAL, Take 1 capsule by mouth once daily., Disp: , Rfl:     levothyroxine (SYNTHROID, LEVOTHROID) 175 MCG tablet, Take 1 tablet (175 mcg total) by mouth before breakfast., Disp: 30 tablet, Rfl: 6    mirabegron (MYRBETRIQ) 50 mg Tb24, , Disp: , Rfl:     multivitamin (THERAGRAN) per tablet, Take 1 tablet by mouth once daily., Disp: , Rfl:     omega-3/dha/epa/fish oil (OMEGA-3 FISH OIL ORAL), Take 4 g by mouth once daily., Disp: , Rfl:     QUERCETIN DIHYDRATE, BULK, MISC, by Misc.(Non-Drug; Combo Route) route. Quercetin, Disp: , Rfl:     RESVERATROL ORAL, Take by mouth. Resveratrol (Longevinex formulation), Disp: , Rfl:     rutin/quercetin/bioflav/bilber (BILBERRY EXTRACT ORAL), Take 1 capsule by mouth once daily., Disp: , Rfl:     sirolimus (RAPAMUNE) 1 MG Tab, Take 10 mg by mouth once a week., Disp: , Rfl:     tadalafiL (CIALIS) 5 MG tablet, Take 1 tablet (5 mg total) by mouth daily as needed for Erectile Dysfunction., Disp: 30 tablet, Rfl: 1    tiZANidine (ZANAFLEX) 4 MG tablet, Take 0.5-1 tablets (2-4 mg total) by mouth nightly as needed (muscular spasm)., Disp: 30 tablet, Rfl: 3    turmeric (CURCUMIN MISC), by Misc.(Non-Drug; Combo Route) route., Disp: , Rfl:     UNABLE TO FIND, Fisetin, Disp: , Rfl:     VITAMIN K2 ORAL, , Disp: , Rfl:     PHYSICAL EXAMINATION  Vitals:    01/30/23 0957   BP: (!) 167/90   Pulse: 71   Resp: 17   General: The patient is a  well-developed, well-nourished looking of stated age in no acute distress.  Head: Normocephalic, atraumatic  Eyes, ears, nose and throat: Bilateral cataracts R>L, normal. Mallampati 3+.  Neck: Supple  Cardiovascular: No carotid bruits.  Regular rate and rhythm.   Mild limited ROM in neck turning   Pain on palpation of cervical paraspinals and trapezius b/l  NEUROLOGIC EXAM:  MENTAL: The patient is awake, alert and oriented times to time, place, location and situation. Intact recent memory, attention, concentration. Language and speech are normal. No aphasia, no dysarthria  CRANIAL NERVES:   CN II: visual fields are intact to confrontation with no defects;   Pupils are equal, round and reactive to light and accommodation.    III, IV and VI: extraocular movements are intact to all directions of gaze with normal convergence.  V: facial sensation is intact to light touch in divisions V1 through V3.    VII: Facial muscle strength is intact to eyebrow raising, forceful eyelid closure, and cheek puffing.    VIII: Hearing acuity is intact to finger rub.  IX, X: palate rises symmetrically and uvula midline.    XI: Sternocleidomastoid and trapezius strength are 5/5 bilaterally.    XII: Tongue protrudes midline without atrophy or fasciculations.   MOTOR EXAM: No atrophy or fasciculations are present. No pronator drift,  shows normal strength ( 5/5 strength )in all 4 extremities. Tone is normal.   SENSORY EXAM: intact  light touch, vibration bilaterally.  CEREBELLAR EXAM: shows normal finger-to-nose and heel-to-shin.   DEEP TENDON REFLEXES:  +2 in brachioradialis, biceps, triceps, 2+ brisk knee jerks, no crossed adductors, 2+ ankle jerks, downgoing toes b/l. No Hinojosa's no ankle clonus  GAIT/STANCE: Romberg Negative.  Standard gait was normal with normal stride, arm swing and turning.  Normal  tandem gait.       Impression:    Cervical myofascial pain - patient has been having for months now mild nondisabling headache after  doing weight training in [place], stiffness that radiates to become bitemporal/bifrontal, on examination limited ROM with reproduction of symptoms on palpation of cervical/trapezius musculature.   Went to PT for strenghtening, stretching and consideration of future dry needling. Otherwise no signs of radiculopathy/myelopathy on exam. Wants to continue PRN tizanidine discussed side effect profile. He is allergic to celebrex.     Snoring - Patient endorses snoring and speaking in sleep. He also notes the at he has been having 6-8 nocturia episodes that are high volume. Mallampati 3+ on exam. Elevated BP today 167/90, but he reports this is unusual. He takes his pressure regularly and usually . Patient concerned about Microvascular ischemic changes noted on MRI Brain. We discussed the importance of controlling hypertension as that is one of the leading causes for these changes.    Diplopia binocular - Resolved that began in spring 2022 (he has known cataracts with blurry vision fromt hat on OD and great vision on OS), notest from ophthalmology report monocular diplopia on OD, but patient reports that covering one eye would resolve it, specifically discussed if covered only left eye with Rt eye vision would not have diplopia this was more noticeable with driving, this has slowly improved the last month and much better. He was sent for MRI brain w/wo contraset, but unable to tolerate procedure due to procedural anxiety. There is note of Rt esotropia that I did not appreciate, no cranial nerve palsies, pupils intact, VFFTC intact. Discussed unclear etiology , concern for an ischemic event given his known CAD and vascular risk factors. We need neuroimaging thus will give ativan so that he can tolerate procedure. He is in agreement    Comorbidities:  Hypothyroidism - on management  HL - on statin   CAD s/p MI- on ASA   BPH- managed by urology   Rt knee pain - has specialist and goes to PT for this.     Plan:   1-  Referral for sleep medicine given snoring and increased nocturia (high volume) lately and increased BP today 167/90    2- For cervical myofascial pain - discussed returning to physical therapy for stretching and dry needling and considering massage therapy    3 - Continue tizandine 2mg to 4 nightly as needed for spasms in the cervical musculature/trapezius.       RTC, if needed, in 6-12 months.   Forty minutes spent on the day of this encounter preparing, treating, and managing this patient

## 2023-03-14 ENCOUNTER — CLINICAL SUPPORT (OUTPATIENT)
Dept: ENDOSCOPY | Facility: HOSPITAL | Age: 73
End: 2023-03-14
Payer: COMMERCIAL

## 2023-03-14 ENCOUNTER — TELEPHONE (OUTPATIENT)
Dept: ENDOSCOPY | Facility: HOSPITAL | Age: 73
End: 2023-03-14

## 2023-03-14 VITALS — HEIGHT: 71 IN | WEIGHT: 180 LBS | BODY MASS INDEX: 25.2 KG/M2

## 2023-03-14 DIAGNOSIS — Z12.11 ENCOUNTER FOR SCREENING COLONOSCOPY FOR NON-HIGH-RISK PATIENT: ICD-10-CM

## 2023-03-14 RX ORDER — POLYETHYLENE GLYCOL 3350, SODIUM SULFATE ANHYDROUS, SODIUM BICARBONATE, SODIUM CHLORIDE, POTASSIUM CHLORIDE 236; 22.74; 6.74; 5.86; 2.97 G/4L; G/4L; G/4L; G/4L; G/4L
4 POWDER, FOR SOLUTION ORAL ONCE
Qty: 4000 ML | Refills: 0 | Status: SHIPPED | OUTPATIENT
Start: 2023-03-14 | End: 2023-03-14

## 2023-03-20 DIAGNOSIS — E03.9 HYPOTHYROIDISM, UNSPECIFIED TYPE: Primary | ICD-10-CM

## 2023-03-20 RX ORDER — LEVOTHYROXINE SODIUM 175 UG/1
175 TABLET ORAL
Qty: 30 TABLET | Refills: 0 | Status: SHIPPED | OUTPATIENT
Start: 2023-03-20 | End: 2023-03-27 | Stop reason: SDUPTHER

## 2023-03-24 ENCOUNTER — PATIENT MESSAGE (OUTPATIENT)
Dept: PRIMARY CARE CLINIC | Facility: CLINIC | Age: 73
End: 2023-03-24
Payer: MEDICARE

## 2023-03-24 DIAGNOSIS — N52.9 ERECTILE DYSFUNCTION, UNSPECIFIED ERECTILE DYSFUNCTION TYPE: ICD-10-CM

## 2023-03-24 DIAGNOSIS — R35.1 BENIGN PROSTATIC HYPERPLASIA WITH NOCTURIA: ICD-10-CM

## 2023-03-24 DIAGNOSIS — N40.1 BENIGN PROSTATIC HYPERPLASIA WITH NOCTURIA: ICD-10-CM

## 2023-03-27 RX ORDER — TADALAFIL 5 MG/1
5 TABLET ORAL DAILY PRN
Qty: 30 TABLET | Refills: 1 | Status: SHIPPED | OUTPATIENT
Start: 2023-03-27 | End: 2023-07-12 | Stop reason: SDUPTHER

## 2023-03-27 RX ORDER — LEVOTHYROXINE SODIUM 175 UG/1
175 TABLET ORAL
Qty: 90 TABLET | Refills: 1 | Status: SHIPPED | OUTPATIENT
Start: 2023-03-27 | End: 2023-07-12 | Stop reason: SDUPTHER

## 2023-03-27 RX ORDER — FINASTERIDE 5 MG/1
5 TABLET, FILM COATED ORAL DAILY
Qty: 90 TABLET | Refills: 1 | Status: SHIPPED | OUTPATIENT
Start: 2023-03-27 | End: 2023-07-12 | Stop reason: SDUPTHER

## 2023-03-27 RX ORDER — ATORVASTATIN CALCIUM 40 MG/1
40 TABLET, FILM COATED ORAL DAILY
Qty: 90 TABLET | Refills: 1 | Status: SHIPPED | OUTPATIENT
Start: 2023-03-27 | End: 2023-07-12 | Stop reason: SDUPTHER

## 2023-03-28 NOTE — TELEPHONE ENCOUNTER
[facility] Initial Evaluation -- Lumbar    Date: March 28, 2023  Moshe Reese is a 44 year old male with a lumbar condition.   Referral: Cassy FROST, CNP  Work mechanical stresses:  Computer, sitting/standing  Employment status:  Full time  Leisure mechanical stresses: golf, walking/hikingy, strength training (until last week)  Functional disability score (JULIA/STarT Back):  See flow sheet  VAS score (0-10): 6/10  Patient goals/expectations:  Pain relief, addressing weakness    HISTORY:  Answers for HPI/ROS submitted by the patient on 3/27/2023  Reason for Visit:: chronic low back pain  Number scale: 6/10  General health as reported by patient: excellent  Medical allergies: none  Surgeries: none  Medications you are currently taking: none  Occupation::   What are your primary job tasks: computer work, prolonged sitting  Present symptoms: R buttocks, R post thigh  Pain quality (sharp/shooting/stabbing/aching/burning/cramping):    Paresthesia (yes/no):  Yes R LE (NT)    Present since (onset date): March 1, 2023.     Symptoms (improving/unchanging/worsening):  worsening.     Symptoms commenced as a result of: no apparent reason   Condition occurred in the following environment:   home     Symptoms at onset (back/thigh/leg): right low back, right buttocks, R hip  Constant symptoms (back/thigh/leg): low back, R LE numbness (improving), weakness  Intermittent symptoms (back/thigh/leg):     Symptoms are made worse with the following: Always Bending, Always Sitting, Always Standing, Time of day - Always AM and Always When still   Symptoms are made better with the following: Always Walking and Always On the move    Disturbed sleep (yes/no):  no Sleeping postures (prone/sup/side R/L): sides, stomach    Previous episodes (0/1-5/6-10/11+): 11+ Year of first episode: 2000    Previous history: ongoing issue since late 20's,   Previous treatments: PT ([facility]), chiro ([facility])      Specific  No new care gaps identified.  [facility] Embedded Care Gaps. Reference number: [id number]. 9/03/2022   8:31:35 PM CDT   Questions:  Cough/Sneeze/Strain (pos/neg): positive, sneeze (ongoing)  Bowel/Bladder (normal/abnormal): normal  Gait (normal/abnormal): normal  Medications (nil/NSAIDS/analg/steroids/anticoag/other):  NSAIDS  Medical allergies:  See chart  General health (excellent/good/fair/poor):  good  Pertinent medical history:  None  Imaging (None/Xray/MRI/Other):  FINDINGS: There are 5 lumbar type vertebral bodies in normal  alignment. Normal vertebral body heights without compression fracture.  Mild disc space narrowing at L4-L5 and L5-S1. The disc spaces are  otherwise maintained. Minimal marginal osteophytes. Posterior element  unremarkable. The visualized sacrum and iliac wings are unremarkable  Recent or major surgery (yes/no):  no  Night pain (yes/no): no  Accidents (yes/no): no  Unexplained weight loss (yes/no): none  Barriers at home: none  Other red flags: none    EXAMINATION    Posture:   Sitting (good/fair/poor): fair  Standing (good/fair/poor):fair  Lordosis (red/acc/normal): red  Correction of posture (better/worse/no effect): no effect    Lateral Shift (right/left/nil): L  Relevant (yes/no):  yes  Other Observations:     Neurological:    Motor deficit:  Ant Tib=4-/5, EHL=3-/5, (Hamstring=4/5), Gastroc=4-/5  Reflexes:    Sensory deficit:  Diminished along L4/L5 dermatome  Dural signs:  +Slump R    Movement Loss:   Thomas Mod Min Nil Pain   Flexion   x     Extension  x      Side Gliding R  x x     Side Gliding L  x x       Test Movements:   During: produces, abolishes, increases, decreases, no effect, centralizing, peripheralizing   After: better, worse, no better, no worse, no effect, centralized, peripheralized    Pretest symptoms standing:    Symptoms During Symptoms After ROM increased ROM decreased No Effect   FIS        Rep FIS        EIS        Rep EIS          Pretest symptoms lying:     Symptoms During Symptoms After ROM increased ROM decreased No Effect   VIANNEY        Rep VIANNEY        EIL        Rep EIL           If required, pretest symptoms: R LE N, LBP   Symptoms During Symptoms After ROM increased ROM decreased No Effect   SGIS - R Increases    No Worse         Rep SGIS - R Increases    No Worse    X, NE on R LE myotomes, shift improved     SGIS - L        Rep SGIS - L          Static Tests:  Sitting slouched:     Sitting erect:    Standing slouched:   Standing erect:    Lying prone in extension:   Long sitting:      Other Tests:     Provisional Classification:  Inconclusive/Other - Mechanically Unresponsive Radiculopathy    Principle of Management:  Education:  Nature of condition, red flags, traffic light, centralization   Equipment provided:    Mechanical therapy (Y/N):  ?   Extension principle:    Lateral Principle:  Rep R SGIS x 10/2 hrs  Flexion principle:    Other:      ASSESSMENT/PLAN:    Patient is a 44 year old male with lumbar complaints.    Patient has the following significant findings with corresponding treatment plan.                Diagnosis 1:  Lumbar radiculopathy  Pain -  manual therapy, self management, education, directional preference exercise and home program  Decreased ROM/flexibility - manual therapy and therapeutic exercise  Decreased strength - therapeutic exercise and therapeutic activities  Decreased function - therapeutic activities  Impaired posture - neuro re-education    Therapy Evaluation Codes:   1) History comprised of:   Personal factors that impact the plan of care:      None.    Comorbidity factors that impact the plan of care are:      None.     Medications impacting care: None.  2) Examination of Body Systems comprised of:   Body structures and functions that impact the plan of care:      Lumbar spine.   Activity limitations that impact the plan of care are:      Bending, Lifting, Sitting and Standing.  3) Clinical presentation characteristics are:   Evolving/Changing.  4) Decision-Making    Low complexity using standardized patient assessment instrument and/or measureable  assessment of functional outcome.  Cumulative Therapy Evaluation is: Low complexity.    Previous and current functional limitations:  (See Goal Flow Sheet for this information)    Short term and Long term goals: (See Goal Flow Sheet for this information)     Communication ability:  Patient appears to be able to clearly communicate and understand verbal and written communication and follow directions correctly.  Treatment Explanation - The following has been discussed with the patient:   RX ordered/plan of care  Anticipated outcomes  Possible risks and side effects  This patient would benefit from PT intervention to resume normal activities.   Rehab potential is fair.    Frequency:  1 X week, once daily  Duration:  for 6 weeks  Discharge Plan:  Achieve all LTG.  Independent in home treatment program.  Reach maximal therapeutic benefit.    Please refer to the daily flowsheet for treatment today, total treatment time and time spent performing 1:1 timed codes.

## 2023-03-31 ENCOUNTER — OFFICE VISIT (OUTPATIENT)
Dept: OPHTHALMOLOGY | Facility: CLINIC | Age: 73
End: 2023-03-31
Payer: COMMERCIAL

## 2023-03-31 DIAGNOSIS — H25.813 COMBINED FORMS OF AGE-RELATED CATARACT OF BOTH EYES: Primary | ICD-10-CM

## 2023-03-31 DIAGNOSIS — H18.9 CORNEAL ABNORMALITY: ICD-10-CM

## 2023-03-31 PROCEDURE — 1160F RVW MEDS BY RX/DR IN RCRD: CPT | Mod: CPTII,S$GLB,, | Performed by: STUDENT IN AN ORGANIZED HEALTH CARE EDUCATION/TRAINING PROGRAM

## 2023-03-31 PROCEDURE — 1101F PT FALLS ASSESS-DOCD LE1/YR: CPT | Mod: CPTII,S$GLB,, | Performed by: STUDENT IN AN ORGANIZED HEALTH CARE EDUCATION/TRAINING PROGRAM

## 2023-03-31 PROCEDURE — 99999 PR PBB SHADOW E&M-EST. PATIENT-LVL II: CPT | Mod: PBBFAC,,, | Performed by: STUDENT IN AN ORGANIZED HEALTH CARE EDUCATION/TRAINING PROGRAM

## 2023-03-31 PROCEDURE — 99214 OFFICE O/P EST MOD 30 MIN: CPT | Mod: S$GLB,,, | Performed by: STUDENT IN AN ORGANIZED HEALTH CARE EDUCATION/TRAINING PROGRAM

## 2023-03-31 PROCEDURE — 99214 PR OFFICE/OUTPT VISIT, EST, LEVL IV, 30-39 MIN: ICD-10-PCS | Mod: S$GLB,,, | Performed by: STUDENT IN AN ORGANIZED HEALTH CARE EDUCATION/TRAINING PROGRAM

## 2023-03-31 PROCEDURE — 3288F PR FALLS RISK ASSESSMENT DOCUMENTED: ICD-10-PCS | Mod: CPTII,S$GLB,, | Performed by: STUDENT IN AN ORGANIZED HEALTH CARE EDUCATION/TRAINING PROGRAM

## 2023-03-31 PROCEDURE — 1159F PR MEDICATION LIST DOCUMENTED IN MEDICAL RECORD: ICD-10-PCS | Mod: CPTII,S$GLB,, | Performed by: STUDENT IN AN ORGANIZED HEALTH CARE EDUCATION/TRAINING PROGRAM

## 2023-03-31 PROCEDURE — 3288F FALL RISK ASSESSMENT DOCD: CPT | Mod: CPTII,S$GLB,, | Performed by: STUDENT IN AN ORGANIZED HEALTH CARE EDUCATION/TRAINING PROGRAM

## 2023-03-31 PROCEDURE — 1160F PR REVIEW ALL MEDS BY PRESCRIBER/CLIN PHARMACIST DOCUMENTED: ICD-10-PCS | Mod: CPTII,S$GLB,, | Performed by: STUDENT IN AN ORGANIZED HEALTH CARE EDUCATION/TRAINING PROGRAM

## 2023-03-31 PROCEDURE — 1101F PR PT FALLS ASSESS DOC 0-1 FALLS W/OUT INJ PAST YR: ICD-10-PCS | Mod: CPTII,S$GLB,, | Performed by: STUDENT IN AN ORGANIZED HEALTH CARE EDUCATION/TRAINING PROGRAM

## 2023-03-31 PROCEDURE — 99999 PR PBB SHADOW E&M-EST. PATIENT-LVL II: ICD-10-PCS | Mod: PBBFAC,,, | Performed by: STUDENT IN AN ORGANIZED HEALTH CARE EDUCATION/TRAINING PROGRAM

## 2023-03-31 PROCEDURE — 1159F MED LIST DOCD IN RCRD: CPT | Mod: CPTII,S$GLB,, | Performed by: STUDENT IN AN ORGANIZED HEALTH CARE EDUCATION/TRAINING PROGRAM

## 2023-03-31 NOTE — PROGRESS NOTES
Subjective:       Patient ID: Thiago Thacker is a 73 y.o. male.    Chief Complaint: Cataract     HPI    Pt states diplopia has improved   Here today to discuss cataract sx OD   States the cataract is effecting his failing life concerned he wont be   able to drive. Reports he previously saw a cornea specialist in [place] for   OS. Likes to wear glasses. Wants to be able to see without glasses to   read. Diplopia has resolved.     Systane gel QHS OU  At's PRN OU  Last edited by Carlyn Addison MD on 3/31/2023  4:53 PM.              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 Romo for evaluation and management of cataracts/cornea      High myopia OU  Takes glasses off to read    Right Esophoria -- resolved  Likely microvascular  MRI no acute abnormality    PLAN  Refer to Dr. Romo for IOL calcs, topography, cataract consultation     RTC PRN      Carlyn Addison M.D., M.S.  Department of Ophthalmology   Division of Glaucoma Surgery  [facility]

## 2023-04-04 ENCOUNTER — TELEPHONE (OUTPATIENT)
Dept: ENDOSCOPY | Facility: HOSPITAL | Age: 73
End: 2023-04-04
Payer: MEDICARE

## 2023-04-04 NOTE — TELEPHONE ENCOUNTER
----- Message from Treva Rivera CMA sent at 4/4/2023  1:35 PM CDT -----  Regarding: Arrival time  Contact: Patient  BLANCA CLAY calling regarding Appointment Access  (message) for #Pt requesting 4/10/23 procedure arrival time. Please call.    Thank you

## 2023-04-05 ENCOUNTER — PATIENT MESSAGE (OUTPATIENT)
Dept: ENDOSCOPY | Facility: HOSPITAL | Age: 73
End: 2023-04-05
Payer: MEDICARE

## 2023-04-07 ENCOUNTER — PATIENT MESSAGE (OUTPATIENT)
Dept: ENDOSCOPY | Facility: HOSPITAL | Age: 73
End: 2023-04-07
Payer: MEDICARE

## 2023-04-21 ENCOUNTER — OFFICE VISIT (OUTPATIENT)
Dept: CARDIOLOGY | Facility: CLINIC | Age: 73
End: 2023-04-21
Payer: COMMERCIAL

## 2023-04-21 VITALS
SYSTOLIC BLOOD PRESSURE: 136 MMHG | OXYGEN SATURATION: 97 % | WEIGHT: 186.06 LBS | BODY MASS INDEX: 25.95 KG/M2 | DIASTOLIC BLOOD PRESSURE: 74 MMHG | HEART RATE: 62 BPM

## 2023-04-21 DIAGNOSIS — E78.00 HYPERCHOLESTEROLEMIA: ICD-10-CM

## 2023-04-21 DIAGNOSIS — I25.10 ATHEROSCLEROSIS OF NATIVE CORONARY ARTERY OF NATIVE HEART WITHOUT ANGINA PECTORIS: Primary | ICD-10-CM

## 2023-04-21 PROCEDURE — 1101F PT FALLS ASSESS-DOCD LE1/YR: CPT | Mod: CPTII,S$GLB,, | Performed by: INTERNAL MEDICINE

## 2023-04-21 PROCEDURE — 99214 PR OFFICE/OUTPT VISIT, EST, LEVL IV, 30-39 MIN: ICD-10-PCS | Mod: S$GLB,,, | Performed by: INTERNAL MEDICINE

## 2023-04-21 PROCEDURE — 1160F PR REVIEW ALL MEDS BY PRESCRIBER/CLIN PHARMACIST DOCUMENTED: ICD-10-PCS | Mod: CPTII,S$GLB,, | Performed by: INTERNAL MEDICINE

## 2023-04-21 PROCEDURE — 1159F MED LIST DOCD IN RCRD: CPT | Mod: CPTII,S$GLB,, | Performed by: INTERNAL MEDICINE

## 2023-04-21 PROCEDURE — 1126F PR PAIN SEVERITY QUANTIFIED, NO PAIN PRESENT: ICD-10-PCS | Mod: CPTII,S$GLB,, | Performed by: INTERNAL MEDICINE

## 2023-04-21 PROCEDURE — 3008F PR BODY MASS INDEX (BMI) DOCUMENTED: ICD-10-PCS | Mod: CPTII,S$GLB,, | Performed by: INTERNAL MEDICINE

## 2023-04-21 PROCEDURE — 3008F BODY MASS INDEX DOCD: CPT | Mod: CPTII,S$GLB,, | Performed by: INTERNAL MEDICINE

## 2023-04-21 PROCEDURE — 1126F AMNT PAIN NOTED NONE PRSNT: CPT | Mod: CPTII,S$GLB,, | Performed by: INTERNAL MEDICINE

## 2023-04-21 PROCEDURE — 3078F DIAST BP <80 MM HG: CPT | Mod: CPTII,S$GLB,, | Performed by: INTERNAL MEDICINE

## 2023-04-21 PROCEDURE — 3075F PR MOST RECENT SYSTOLIC BLOOD PRESS GE 130-139MM HG: ICD-10-PCS | Mod: CPTII,S$GLB,, | Performed by: INTERNAL MEDICINE

## 2023-04-21 PROCEDURE — 3075F SYST BP GE 130 - 139MM HG: CPT | Mod: CPTII,S$GLB,, | Performed by: INTERNAL MEDICINE

## 2023-04-21 PROCEDURE — 99999 PR PBB SHADOW E&M-EST. PATIENT-LVL IV: ICD-10-PCS | Mod: PBBFAC,,, | Performed by: INTERNAL MEDICINE

## 2023-04-21 PROCEDURE — 3078F PR MOST RECENT DIASTOLIC BLOOD PRESSURE < 80 MM HG: ICD-10-PCS | Mod: CPTII,S$GLB,, | Performed by: INTERNAL MEDICINE

## 2023-04-21 PROCEDURE — 3288F PR FALLS RISK ASSESSMENT DOCUMENTED: ICD-10-PCS | Mod: CPTII,S$GLB,, | Performed by: INTERNAL MEDICINE

## 2023-04-21 PROCEDURE — 1160F RVW MEDS BY RX/DR IN RCRD: CPT | Mod: CPTII,S$GLB,, | Performed by: INTERNAL MEDICINE

## 2023-04-21 PROCEDURE — 99999 PR PBB SHADOW E&M-EST. PATIENT-LVL IV: CPT | Mod: PBBFAC,,, | Performed by: INTERNAL MEDICINE

## 2023-04-21 PROCEDURE — 3288F FALL RISK ASSESSMENT DOCD: CPT | Mod: CPTII,S$GLB,, | Performed by: INTERNAL MEDICINE

## 2023-04-21 PROCEDURE — 1101F PR PT FALLS ASSESS DOC 0-1 FALLS W/OUT INJ PAST YR: ICD-10-PCS | Mod: CPTII,S$GLB,, | Performed by: INTERNAL MEDICINE

## 2023-04-21 PROCEDURE — 99214 OFFICE O/P EST MOD 30 MIN: CPT | Mod: S$GLB,,, | Performed by: INTERNAL MEDICINE

## 2023-04-21 PROCEDURE — 1159F PR MEDICATION LIST DOCUMENTED IN MEDICAL RECORD: ICD-10-PCS | Mod: CPTII,S$GLB,, | Performed by: INTERNAL MEDICINE

## 2023-04-21 NOTE — PROGRESS NOTES
[facility]    Chief Complaint  Chief Complaint   Patient presents with    Coronary Artery Disease       HPI:    Presents for annual follow-up without complaints.  Brings a lipids subcortical profile.  Looks generally good.  Tolerating his medications.  Active and exercising regularly without exertional dyspnea or chest discomfort.    Medications  Current Outpatient Medications   Medication Sig Dispense Refill    aspirin (ECOTRIN) 81 MG EC tablet Take 81 mg by mouth once daily.      atorvastatin (LIPITOR) 40 MG tablet Take 1 tablet (40 mg total) by mouth once daily. 90 tablet 1    finasteride (PROSCAR) 5 mg tablet Take 1 tablet (5 mg total) by mouth once daily. 90 tablet 1    GREEN TEA EXTRACT ORAL Take 1 capsule by mouth once daily.      levothyroxine (SYNTHROID, LEVOTHROID) 175 MCG tablet Take 1 tablet (175 mcg total) by mouth before breakfast. 90 tablet 1    mirabegron (MYRBETRIQ) 50 mg Tb24       multivitamin (THERAGRAN) per tablet Take 1 tablet by mouth once daily.      omega-3/dha/epa/fish oil (OMEGA-3 FISH OIL ORAL) Take 4 g by mouth once daily.      QUERCETIN DIHYDRATE, BULK, MISC by Misc.(Non-Drug; Combo Route) route. Quercetin      RESVERATROL ORAL Take by mouth. Resveratrol (Longevinex formulation)      rutin/quercetin/bioflav/bilber (BILBERRY EXTRACT ORAL) Take 1 capsule by mouth once daily.      sirolimus (RAPAMUNE) 1 MG Tab Take 10 mg by mouth once a week.      tadalafiL (CIALIS) 5 MG tablet Take 1 tablet (5 mg total) by mouth daily as needed for Erectile Dysfunction. 30 tablet 1    tiZANidine (ZANAFLEX) 4 MG tablet Take 0.5-1 tablets (2-4 mg total) by mouth nightly as needed (muscular spasm). 30 tablet 3    turmeric (CURCUMIN MISC) by Misc.(Non-Drug; Combo Route) route.      UNABLE TO FIND Fisetin      VITAMIN K2 ORAL        No current facility-administered medications for this visit.        History  Past Medical History:   Diagnosis Date    Coronary atherosclerosis     Headache      Hypercholesterolemia     Myocardial infarction 2011     Past Surgical History:   Procedure Laterality Date    BLEPHAROPLASTY      COMPLETE LASER PHOTOVAPORIZATION OF PROSTATE      CORONARY STENT PLACEMENT  2011    INGUINAL HERNIA REPAIR      vein       Social History     Socioeconomic History    Marital status: Single   Tobacco Use    Smoking status: Never    Smokeless tobacco: Never   Substance and Sexual Activity    Alcohol use: Not Currently    Drug use: Never    Sexual activity: Not Currently     Partners: Female     Family History   Problem Relation Age of Onset    Colon cancer Mother 80    Heart attack Father 90        Allergies  Review of patient's allergies indicates:   Allergen Reactions    Celebrex [celecoxib] Rash       Review of Systems   Review of Systems   Constitutional: Negative for malaise/fatigue, weight gain and weight loss.   Eyes:  Negative for visual disturbance.   Cardiovascular:  Negative for chest pain, claudication, cyanosis, dyspnea on exertion, irregular heartbeat, leg swelling, near-syncope, orthopnea, palpitations, paroxysmal nocturnal dyspnea and syncope.   Respiratory:  Negative for cough, hemoptysis, shortness of breath, sleep disturbances due to breathing and wheezing.    Hematologic/Lymphatic: Negative for bleeding problem. Does not bruise/bleed easily.   Skin:  Negative for poor wound healing.   Musculoskeletal:  Negative for muscle cramps and myalgias.   Gastrointestinal:  Negative for abdominal pain, anorexia, diarrhea, heartburn, hematemesis, hematochezia, melena, nausea and vomiting.   Genitourinary:  Negative for hematuria and nocturia.   Neurological:  Negative for excessive daytime sleepiness, dizziness, focal weakness, light-headedness and weakness.     Physical Exam  Vitals:    04/21/23 0905   BP: 136/74   Pulse: 62     Wt Readings from Last 1 Encounters:   04/21/23 84.4 kg (186 lb 1.1 oz)     Physical Exam  Vitals and nursing note reviewed.   Constitutional:        General: He is not in acute distress.     Appearance: He is not toxic-appearing or diaphoretic.   HENT:      Head: Normocephalic and atraumatic.      Mouth/Throat:      Lips: Pink.      Mouth: Mucous membranes are moist.   Eyes:      General: No scleral icterus.     Conjunctiva/sclera: Conjunctivae normal.   Neck:      Thyroid: No thyromegaly.      Vascular: No carotid bruit, hepatojugular reflux or JVD.      Trachea: Trachea normal.   Cardiovascular:      Rate and Rhythm: Normal rate and regular rhythm. No extrasystoles are present.     Chest Wall: PMI is not displaced.      Pulses:           Carotid pulses are 2+ on the right side and 2+ on the left side.       Radial pulses are 2+ on the right side and 2+ on the left side.        Dorsalis pedis pulses are 2+ on the right side and 2+ on the left side.        Posterior tibial pulses are 2+ on the right side and 2+ on the left side.      Heart sounds: S1 normal and S2 normal. No murmur heard.    No friction rub. No S3 or S4 sounds.   Pulmonary:      Effort: Pulmonary effort is normal. No tachypnea, bradypnea, accessory muscle usage or respiratory distress.      Breath sounds: Normal breath sounds and air entry. No decreased breath sounds, wheezing, rhonchi or rales.   Abdominal:      General: Bowel sounds are normal. There is no distension or abdominal bruit.      Palpations: Abdomen is soft. There is no hepatomegaly, splenomegaly or pulsatile mass.      Tenderness: There is no abdominal tenderness.   Musculoskeletal:         General: No tenderness or deformity.      Right lower leg: No edema.      Left lower leg: No edema.   Skin:     General: Skin is warm and dry.      Capillary Refill: Capillary refill takes less than 2 seconds.      Coloration: Skin is not cyanotic or pale.      Nails: There is no clubbing.   Neurological:      General: No focal deficit present.      Mental Status: He is alert and oriented to person, place, and time.   Psychiatric:          Attention and Perception: Attention normal.         Mood and Affect: Mood normal.         Speech: Speech normal.         Behavior: Behavior normal. Behavior is cooperative.       Labs  Office Visit on 11/17/2022   Component Date Value Ref Range Status    WBC 11/25/2022 4.2  3.8 - 10.8 Thousand/uL Final    RBC 11/25/2022 4.61  4.20 - 5.80 Million/uL Final    Hemoglobin 11/25/2022 14.4  13.2 - 17.1 g/dL Final    Hematocrit 11/25/2022 42.8  38.5 - 50.0 % Final    MCV 11/25/2022 92.8  80.0 - 100.0 fL Final    MCH 11/25/2022 31.2  27.0 - 33.0 pg Final    MCHC 11/25/2022 33.6  32.0 - 36.0 g/dL Final    RDW 11/25/2022 12.6  11.0 - 15.0 % Final    Platelets 11/25/2022 172  140 - 400 Thousand/uL Final    MPV 11/25/2022 10.6  7.5 - 12.5 fL Final    Neutrophils, Abs 11/25/2022 3,112  1,500 - 7,800 cells/uL Final    Lymph # 11/25/2022 668 (L)  850 - 3,900 cells/uL Final    Mono # 11/25/2022 340  200 - 950 cells/uL Final    Eos # 11/25/2022 50  15 - 500 cells/uL Final    Baso # 11/25/2022 29  0 - 200 cells/uL Final    Neutrophils Relative 11/25/2022 74.1  % Final    Lymph % 11/25/2022 15.9  % Final    Mono % 11/25/2022 8.1  % Final    Eosinophil % 11/25/2022 1.2  % Final    Basophil % 11/25/2022 0.7  % Final    Copper 11/25/2022 106  70 - 175 mcg/dL Final    Comment:    This test was developed and its analytical performance   characteristics have been determined by [facility]. It has not been cleared or approved by the  FDA. This assay has been validated pursuant to the CLIA   regulations and is used for clinical purposes.      Folate 11/25/2022 >24.0  ng/mL Final    Comment:                            Reference Range                             Low:           <3.4                             Borderline:    3.4-5.4                             Normal:        >5.4         Vitamin B-12 11/25/2022 718  200 - 1,100 pg/mL Final    Pathologist Review Peripheral Smear 11/25/2022 PATHOLOGIST REVIEW WILL REPORT UNDER SEPARATE  COVER.   Final    Pathologist Name 11/25/2022 Terri Hutchinson MD, Board Certified in Anatomic Pathology, Clinical Pathology and Hematopathology [phone]  (electronic signature)   Final    Source (Spec A) 11/25/2022 PATHOLOGIST REVIEW OF PERIPHERAL SMEAR   Final    Diagnosis (Spec A) 11/25/2022 NO SPECIFIC MORPHOLOGIC ABNORMALITIES   Final    Comment (Spec A) 11/25/2022 The WBC morphology is unremarkable. There is no significant left shift, and no blasts or abnormal lymphoid cells are identified. There is no increase in schistocytes or microspherocytes. Platelets are unremarkable.   Final       Imaging  No results found.    Assessment  1. Atherosclerosis of native coronary artery of native heart without angina pectoris  Stable and free of angina.  Risk factors well managed.    2. Hypercholesterolemia  Controlled and tolerating atorvastatin      Plan and Discussion    Continue current guideline directed medical therapy.    The ASCVD Risk score (Kacie DK, et al., 2019) failed to calculate for the following reasons:    The patient has a prior MI or stroke diagnosis     Follow Up  Follow up in about 1 year (around 4/21/2024).      Todd Montenegro MD

## 2023-04-28 ENCOUNTER — OFFICE VISIT (OUTPATIENT)
Dept: OPHTHALMOLOGY | Facility: CLINIC | Age: 73
End: 2023-04-28
Payer: COMMERCIAL

## 2023-04-28 DIAGNOSIS — H25.12 NUCLEAR SCLEROTIC CATARACT OF LEFT EYE: ICD-10-CM

## 2023-04-28 DIAGNOSIS — H25.11 NUCLEAR SCLEROTIC CATARACT OF RIGHT EYE: Primary | ICD-10-CM

## 2023-04-28 DIAGNOSIS — H18.9 CORNEAL ABNORMALITY: ICD-10-CM

## 2023-04-28 DIAGNOSIS — H18.452 SALZMANN'S NODULAR DEGENERATION OF CORNEA OF LEFT EYE: ICD-10-CM

## 2023-04-28 DIAGNOSIS — H16.223 KERATOCONJUNCTIVITIS SICCA NOT SPECIFIED AS SJOGREN'S, BILATERAL: ICD-10-CM

## 2023-04-28 PROCEDURE — 1101F PR PT FALLS ASSESS DOC 0-1 FALLS W/OUT INJ PAST YR: ICD-10-PCS | Mod: CPTII,S$GLB,, | Performed by: OPHTHALMOLOGY

## 2023-04-28 PROCEDURE — 99999 PR PBB SHADOW E&M-EST. PATIENT-LVL III: ICD-10-PCS | Mod: PBBFAC,,, | Performed by: OPHTHALMOLOGY

## 2023-04-28 PROCEDURE — 1101F PT FALLS ASSESS-DOCD LE1/YR: CPT | Mod: CPTII,S$GLB,, | Performed by: OPHTHALMOLOGY

## 2023-04-28 PROCEDURE — 99214 OFFICE O/P EST MOD 30 MIN: CPT | Mod: S$GLB,,, | Performed by: OPHTHALMOLOGY

## 2023-04-28 PROCEDURE — 3288F FALL RISK ASSESSMENT DOCD: CPT | Mod: CPTII,S$GLB,, | Performed by: OPHTHALMOLOGY

## 2023-04-28 PROCEDURE — 99999 PR PBB SHADOW E&M-EST. PATIENT-LVL III: CPT | Mod: PBBFAC,,, | Performed by: OPHTHALMOLOGY

## 2023-04-28 PROCEDURE — 1159F PR MEDICATION LIST DOCUMENTED IN MEDICAL RECORD: ICD-10-PCS | Mod: CPTII,S$GLB,, | Performed by: OPHTHALMOLOGY

## 2023-04-28 PROCEDURE — 1126F PR PAIN SEVERITY QUANTIFIED, NO PAIN PRESENT: ICD-10-PCS | Mod: CPTII,S$GLB,, | Performed by: OPHTHALMOLOGY

## 2023-04-28 PROCEDURE — 92136 IOL MASTER - OD - RIGHT EYE: ICD-10-PCS | Mod: RT,S$GLB,, | Performed by: OPHTHALMOLOGY

## 2023-04-28 PROCEDURE — 92136 OPHTHALMIC BIOMETRY: CPT | Mod: RT,S$GLB,, | Performed by: OPHTHALMOLOGY

## 2023-04-28 PROCEDURE — 99214 PR OFFICE/OUTPT VISIT, EST, LEVL IV, 30-39 MIN: ICD-10-PCS | Mod: S$GLB,,, | Performed by: OPHTHALMOLOGY

## 2023-04-28 PROCEDURE — 92025 COMPUTERIZED CORNEAL TOPOGRAPHY: ICD-10-PCS | Mod: S$GLB,,, | Performed by: OPHTHALMOLOGY

## 2023-04-28 PROCEDURE — 1159F MED LIST DOCD IN RCRD: CPT | Mod: CPTII,S$GLB,, | Performed by: OPHTHALMOLOGY

## 2023-04-28 PROCEDURE — 1126F AMNT PAIN NOTED NONE PRSNT: CPT | Mod: CPTII,S$GLB,, | Performed by: OPHTHALMOLOGY

## 2023-04-28 PROCEDURE — 92025 CPTRIZED CORNEAL TOPOGRAPHY: CPT | Mod: S$GLB,,, | Performed by: OPHTHALMOLOGY

## 2023-04-28 PROCEDURE — 3288F PR FALLS RISK ASSESSMENT DOCUMENTED: ICD-10-PCS | Mod: CPTII,S$GLB,, | Performed by: OPHTHALMOLOGY

## 2023-04-28 NOTE — PROGRESS NOTES
HPI     - neuropsychology    Systane Gel Qhs OU   At's Prn OU     High Myopia   S/p lower lid bleph OU  NSC OU   Corneal Abnormality OS     Dr. Thacker    Pt states he is here for Cataract Eval and to find out what is wrong with   his OS.   Wants to talk about Lens options Possibly wanting to not have to wear   glasses.       Last edited by Nessa Romo MD on 4/28/2023  1:11 PM.            Assessment /Plan     For exam results, see Encounter Report.    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  Pt desires cataract surgery for [facility] rehabilitation.   - R/B/A discussed and pt agrees to proceed with surgery.   - IOL options discussed according to patient's goals and concomitant ocular pathology; and pt content with monofocal lens.    - Target: near.    Diboo 13.5 or 14.0 OD   Ora - no charge  Near    (Diboo 15.0 OS vs. Toric)

## 2023-05-02 ENCOUNTER — TELEPHONE (OUTPATIENT)
Dept: OPHTHALMOLOGY | Facility: CLINIC | Age: 73
End: 2023-05-02
Payer: MEDICARE

## 2023-05-04 ENCOUNTER — TELEPHONE (OUTPATIENT)
Dept: OPHTHALMOLOGY | Facility: CLINIC | Age: 73
End: 2023-05-04
Payer: MEDICARE

## 2023-05-06 ENCOUNTER — TELEPHONE (OUTPATIENT)
Dept: ENDOSCOPY | Facility: HOSPITAL | Age: 73
End: 2023-05-06
Payer: MEDICARE

## 2023-05-07 ENCOUNTER — TELEPHONE (OUTPATIENT)
Dept: OPHTHALMOLOGY | Facility: CLINIC | Age: 73
End: 2023-05-07
Payer: MEDICARE

## 2023-05-07 DIAGNOSIS — H25.11 NUCLEAR SCLEROTIC CATARACT OF RIGHT EYE: Primary | ICD-10-CM

## 2023-05-07 NOTE — TELEPHONE ENCOUNTER
Left  for patient to return call to reschedule appointment with Dr. King on Tuesday morning due to him being in surgery. We can get him rescheduled to the afternoon or different day. Callback number provided.  
[phone]

## 2023-05-09 ENCOUNTER — TELEPHONE (OUTPATIENT)
Dept: PRIMARY CARE CLINIC | Facility: CLINIC | Age: 73
End: 2023-05-09
Payer: MEDICARE

## 2023-05-09 DIAGNOSIS — Z12.12 ENCOUNTER FOR COLORECTAL CANCER SCREENING: Primary | ICD-10-CM

## 2023-05-09 DIAGNOSIS — Z12.11 ENCOUNTER FOR COLORECTAL CANCER SCREENING: Primary | ICD-10-CM

## 2023-05-10 DIAGNOSIS — H25.11 NUCLEAR SCLEROTIC CATARACT OF RIGHT EYE: Primary | ICD-10-CM

## 2023-05-16 ENCOUNTER — TELEPHONE (OUTPATIENT)
Dept: PRIMARY CARE CLINIC | Facility: CLINIC | Age: 73
End: 2023-05-16
Payer: MEDICARE

## 2023-05-17 ENCOUNTER — PES CALL (OUTPATIENT)
Dept: ADMINISTRATIVE | Facility: CLINIC | Age: 73
End: 2023-05-17
Payer: MEDICARE

## 2023-05-29 NOTE — H&P
History    Chief complaint:  Painless progressive vision loss    Present Ilness/Diagnosis: Nuclear sclerotic Cataract    Past Medical History:  has a past medical history of Coronary atherosclerosis, Headache, Hypercholesterolemia, and Myocardial infarction (2011).    Family History/Social History: refer to chart    Allergies:   Review of patient's allergies indicates:   Allergen Reactions    Celebrex [celecoxib] Rash       Current Medications: No current facility-administered medications for this encounter.    Current Outpatient Medications:     aspirin (ECOTRIN) 81 MG EC tablet, Take 81 mg by mouth once daily., Disp: , Rfl:     atorvastatin (LIPITOR) 40 MG tablet, Take 1 tablet (40 mg total) by mouth once daily., Disp: 90 tablet, Rfl: 1    finasteride (PROSCAR) 5 mg tablet, Take 1 tablet (5 mg total) by mouth once daily., Disp: 90 tablet, Rfl: 1    GREEN TEA EXTRACT ORAL, Take 1 capsule by mouth once daily., Disp: , Rfl:     levothyroxine (SYNTHROID, LEVOTHROID) 175 MCG tablet, Take 1 tablet (175 mcg total) by mouth before breakfast., Disp: 90 tablet, Rfl: 1    mirabegron (MYRBETRIQ) 50 mg Tb24, , Disp: , Rfl:     multivitamin (THERAGRAN) per tablet, Take 1 tablet by mouth once daily., Disp: , Rfl:     omega-3/dha/epa/fish oil (OMEGA-3 FISH OIL ORAL), Take 4 g by mouth once daily., Disp: , Rfl:     prednisolon/gatiflox/bromfenac (PREDNISOLONE-GATIFLOX-BROMFENAC) 1-0.5-0.09 % ophthalmic solution, Place 1 drop into the right eye 3 (three) times daily., Disp: 5 mL, Rfl: 3    QUERCETIN DIHYDRATE, BULK, MISC, by Misc.(Non-Drug; Combo Route) route. Quercetin, Disp: , Rfl:     RESVERATROL ORAL, Take by mouth. Resveratrol (Longevinex formulation), Disp: , Rfl:     rutin/quercetin/bioflav/bilber (BILBERRY EXTRACT ORAL), Take 1 capsule by mouth once daily., Disp: , Rfl:     sirolimus (RAPAMUNE) 1 MG Tab, Take 10 mg by mouth once a week., Disp: , Rfl:     tadalafiL (CIALIS) 5 MG tablet, Take 1 tablet (5 mg total) by mouth  daily as needed for Erectile Dysfunction., Disp: 30 tablet, Rfl: 1    tiZANidine (ZANAFLEX) 4 MG tablet, Take 0.5-1 tablets (2-4 mg total) by mouth nightly as needed (muscular spasm)., Disp: 30 tablet, Rfl: 3    turmeric (CURCUMIN MISC), by Misc.(Non-Drug; Combo Route) route., Disp: , Rfl:     UNABLE TO FIND, Fisetin, Disp: , Rfl:     VITAMIN K2 ORAL, , Disp: , Rfl:     Physical Exam    BP: Vital signs stable  General: No apparent distress  HEENT: nuclear sclerotic cataract  Lungs: adequate respirations  Heart: + pulses  Abdomen: soft  Rectal/pelvic: deferred    Impression: Visually significant Cataract.    See previous clinic notes for surgical indications.    Plan: Phacoemulsification with implantation of Intraocular lens

## 2023-05-31 ENCOUNTER — TELEPHONE (OUTPATIENT)
Dept: OPHTHALMOLOGY | Facility: CLINIC | Age: 73
End: 2023-05-31
Payer: MEDICARE

## 2023-05-31 ENCOUNTER — ANESTHESIA EVENT (OUTPATIENT)
Dept: SURGERY | Facility: HOSPITAL | Age: 73
End: 2023-05-31
Payer: COMMERCIAL

## 2023-05-31 ENCOUNTER — PATIENT MESSAGE (OUTPATIENT)
Dept: PREADMISSION TESTING | Facility: HOSPITAL | Age: 73
End: 2023-05-31
Payer: MEDICARE

## 2023-05-31 NOTE — PRE-PROCEDURE INSTRUCTIONS
- Nothing to eat or drink after midinight, except AM meds with small sips of water  - No Diabetic meds or Fluid pills  - Hold All non-insulin shots until after surgery (7 days)  - All B/P meds are ok to take, except those that contain a fluid pill  - Hold all vits and herbal meds AM of surgery  - Use inhalers as needed and bring AM of surgery  - Use eye drops as directed  - Shower and wash face with dial soap for 3 mins PM prior and AM of surgery  - No powder, lotions, creams, (makeup),  or jewelry    - Wear comfortable clothing (button up shirt)    (Patients ride may not leave while patient is in surgery)    -- 2nd floor surgery ctr at [facility] @ [address] [place] [address]       Pt voiced understanding

## 2023-05-31 NOTE — TELEPHONE ENCOUNTER
Patient instructed to follow up with his regular Primary Care Physician or Walk-in Care if his symptoms fail to improve as anticipated, worsen, or new symptoms develop.  Please proceed to the nearest hospital Emergency Room or call 911 for medical emergencies.    Additional Educational Resources:  For additional resources regarding your symptoms, diagnosis, or further health information, please visit the Health Resources section on [email or web address] or the Online Health Resources section in [email or web address].     Spoke with pt provided arrival time of 8:00 for sx on 6/1/23 with Dr. Romo @ [facility]

## 2023-06-01 ENCOUNTER — HOSPITAL ENCOUNTER (OUTPATIENT)
Facility: HOSPITAL | Age: 73
Discharge: HOME | End: 2023-06-01
Attending: OPHTHALMOLOGY | Admitting: OPHTHALMOLOGY
Payer: COMMERCIAL

## 2023-06-01 ENCOUNTER — ANESTHESIA (OUTPATIENT)
Dept: SURGERY | Facility: HOSPITAL | Age: 73
End: 2023-06-01
Payer: COMMERCIAL

## 2023-06-01 VITALS
TEMPERATURE: 99 F | DIASTOLIC BLOOD PRESSURE: 79 MMHG | HEIGHT: 71 IN | RESPIRATION RATE: 16 BRPM | WEIGHT: 180 LBS | OXYGEN SATURATION: 98 % | BODY MASS INDEX: 25.2 KG/M2 | HEART RATE: 56 BPM | SYSTOLIC BLOOD PRESSURE: 115 MMHG

## 2023-06-01 DIAGNOSIS — H25.11 NUCLEAR SCLEROTIC CATARACT OF RIGHT EYE: Primary | ICD-10-CM

## 2023-06-01 DIAGNOSIS — H25.10 AGE-RELATED NUCLEAR CATARACT: ICD-10-CM

## 2023-06-01 PROCEDURE — 36000706: Performed by: OPHTHALMOLOGY

## 2023-06-01 PROCEDURE — 36000707: Performed by: OPHTHALMOLOGY

## 2023-06-01 PROCEDURE — V2632 POST CHMBR INTRAOCULAR LENS: HCPCS | Performed by: OPHTHALMOLOGY

## 2023-06-01 PROCEDURE — 71000015 HC POSTOP RECOV 1ST HR: Performed by: OPHTHALMOLOGY

## 2023-06-01 PROCEDURE — 25000003 PHARM REV CODE 250: Performed by: OPHTHALMOLOGY

## 2023-06-01 PROCEDURE — 37000009 HC ANESTHESIA EA ADD 15 MINS: Performed by: OPHTHALMOLOGY

## 2023-06-01 PROCEDURE — 66984 XCAPSL CTRC RMVL W/O ECP: CPT | Mod: RT,,, | Performed by: OPHTHALMOLOGY

## 2023-06-01 PROCEDURE — 37000008 HC ANESTHESIA 1ST 15 MINUTES: Performed by: OPHTHALMOLOGY

## 2023-06-01 PROCEDURE — 63600175 PHARM REV CODE 636 W HCPCS: Performed by: NURSE ANESTHETIST, CERTIFIED REGISTERED

## 2023-06-01 PROCEDURE — D9220A PRA ANESTHESIA: Mod: ,,, | Performed by: NURSE ANESTHETIST, CERTIFIED REGISTERED

## 2023-06-01 PROCEDURE — 99900035 HC TECH TIME PER 15 MIN (STAT)

## 2023-06-01 PROCEDURE — 25000003 PHARM REV CODE 250: Performed by: NURSE ANESTHETIST, CERTIFIED REGISTERED

## 2023-06-01 PROCEDURE — D9220A PRA ANESTHESIA: ICD-10-PCS | Mod: ,,, | Performed by: NURSE ANESTHETIST, CERTIFIED REGISTERED

## 2023-06-01 PROCEDURE — 66984 PR REMOVAL, CATARACT, W/INSRT INTRAOC LENS, W/O ENDO CYCLO: ICD-10-PCS | Mod: RT,,, | Performed by: OPHTHALMOLOGY

## 2023-06-01 DEVICE — LENS EYHANCE +14.0D: Type: IMPLANTABLE DEVICE | Site: EYE | Status: FUNCTIONAL

## 2023-06-01 RX ORDER — SODIUM CHLORIDE 0.9 % (FLUSH) 0.9 %
2 SYRINGE (ML) INJECTION
Status: ACTIVE | OUTPATIENT
Start: 2023-06-01

## 2023-06-01 RX ORDER — ACETAMINOPHEN 325 MG/1
650 TABLET ORAL EVERY 4 HOURS PRN
Status: DISCONTINUED | OUTPATIENT
Start: 2023-06-01 | End: 2023-06-01 | Stop reason: HOSPADM

## 2023-06-01 RX ORDER — DEXMEDETOMIDINE HYDROCHLORIDE 100 UG/ML
INJECTION, SOLUTION INTRAVENOUS
Status: DISCONTINUED | OUTPATIENT
Start: 2023-06-01 | End: 2023-06-01

## 2023-06-01 RX ORDER — PHENYLEPHRINE HYDROCHLORIDE 100 MG/ML
1 SOLUTION/ DROPS OPHTHALMIC
Status: ACTIVE | OUTPATIENT
Start: 2023-06-01

## 2023-06-01 RX ORDER — MIDAZOLAM HYDROCHLORIDE 1 MG/ML
INJECTION, SOLUTION INTRAMUSCULAR; INTRAVENOUS
Status: DISCONTINUED | OUTPATIENT
Start: 2023-06-01 | End: 2023-06-01

## 2023-06-01 RX ORDER — PREDNISOLONE ACETATE 10 MG/ML
SUSPENSION/ DROPS OPHTHALMIC
Status: DISCONTINUED | OUTPATIENT
Start: 2023-06-01 | End: 2023-06-01 | Stop reason: HOSPADM

## 2023-06-01 RX ORDER — TETRACAINE HYDROCHLORIDE 5 MG/ML
SOLUTION OPHTHALMIC
Status: DISCONTINUED | OUTPATIENT
Start: 2023-06-01 | End: 2023-06-01 | Stop reason: HOSPADM

## 2023-06-01 RX ORDER — ERGOCALCIFEROL 1.25 MG/1
50000 CAPSULE ORAL DAILY
COMMUNITY
End: 2023-07-25

## 2023-06-01 RX ORDER — LIDOCAINE HYDROCHLORIDE 40 MG/ML
INJECTION, SOLUTION RETROBULBAR
Status: DISCONTINUED | OUTPATIENT
Start: 2023-06-01 | End: 2023-06-01 | Stop reason: HOSPADM

## 2023-06-01 RX ORDER — MOXIFLOXACIN 5 MG/ML
1 SOLUTION/ DROPS OPHTHALMIC
Status: COMPLETED | OUTPATIENT
Start: 2023-06-01 | End: 2023-06-01

## 2023-06-01 RX ORDER — PROPARACAINE HYDROCHLORIDE 5 MG/ML
1 SOLUTION/ DROPS OPHTHALMIC
Status: DISCONTINUED | OUTPATIENT
Start: 2023-06-01 | End: 2023-06-01 | Stop reason: HOSPADM

## 2023-06-01 RX ORDER — CYCLOP/TROP/PROPA/PHEN/KET/WAT 1-1-0.1%
1 DROPS (EA) OPHTHALMIC (EYE)
Status: COMPLETED | OUTPATIENT
Start: 2023-06-01 | End: 2023-06-01

## 2023-06-01 RX ORDER — LIDOCAINE HYDROCHLORIDE 10 MG/ML
INJECTION, SOLUTION EPIDURAL; INFILTRATION; INTRACAUDAL; PERINEURAL
Status: DISCONTINUED | OUTPATIENT
Start: 2023-06-01 | End: 2023-06-01 | Stop reason: HOSPADM

## 2023-06-01 RX ORDER — MOXIFLOXACIN 5 MG/ML
SOLUTION/ DROPS OPHTHALMIC
Status: DISCONTINUED | OUTPATIENT
Start: 2023-06-01 | End: 2023-06-01 | Stop reason: HOSPADM

## 2023-06-01 RX ADMIN — MOXIFLOXACIN OPHTHALMIC 1 DROP: 5 SOLUTION/ DROPS OPHTHALMIC at 08:06

## 2023-06-01 RX ADMIN — MIDAZOLAM 1 MG: 1 INJECTION INTRAMUSCULAR; INTRAVENOUS at 08:06

## 2023-06-01 RX ADMIN — Medication 1 DROP: at 08:06

## 2023-06-01 RX ADMIN — MOXIFLOXACIN 1 DROP: 5 SOLUTION/ DROPS OPHTHALMIC at 09:06

## 2023-06-01 RX ADMIN — DEXMEDETOMIDINE 8 MCG: 100 INJECTION, SOLUTION INTRAVENOUS at 08:06

## 2023-06-01 NOTE — PROGRESS NOTES
Pt is steady on feet. No distress noted. Pt has return to baseline. Dr hope came to evaluate pt. Instructed pt that he cannot drive or operate any heavy machinery. Pt express understanding.

## 2023-06-01 NOTE — DISCHARGE INSTRUCTIONS
Dr. Romo     Cataract Post-Operative Instructions       Day of surgery:     -Resume drops THREE times daily into the operative eye.     -Do not rub your eye     -Wear protective sunglasses during the day.     -Resume moderate activity.     -Bathe/shower/wash face normally     -Do not apply makeup around the operative eye for 1 week.     -You should expect:     Blurry vision and halos for 24-48 hours     Dilated pupil for 24-48 hours     Scratchy feeling in the eye for 1-2 days     Curved shadow in your peripheral vision for 2-3 weeks     Occasional flickering of lights for up to 1 week     -If you experience severe pain or nausea, call Dr. Romo or the on-call doctor at [phone].       Plan to see Dr. Romo tomorrow at:     [facility] [address]     10TH FLOOR     [facility] [id number]     **Most patients can drive the next morning.  If you do not feel comfortable driving, please arrange for transportation. **

## 2023-06-01 NOTE — PLAN OF CARE
"I have evaluated this patient after "MAC" anesthesia care.  It has been at least 2 hours since MAC.  He is neurologically intact and appropriately conversing.  His decision making abilities seem to be at functional baseline.  He is cleared for medical discharge at this time.  I have discussed with this patient that he is not to operate a motorized vehicle today and have recommended means of alternative transport.  "

## 2023-06-01 NOTE — ANESTHESIA PREPROCEDURE EVALUATION
05/31/2023  Thiago Thacker is a 73 y.o., male.      Pre-op Assessment    I have reviewed the Patient Summary Reports.    I have reviewed the NPO Status.   I have reviewed the Medications.     Review of Systems  Anesthesia Hx:  No problems with previous Anesthesia  Denies Family Hx of Anesthesia complications.   Denies Personal Hx of Anesthesia complications.   Cardiovascular:   Past MI CAD   hyperlipidemia NYHA Classification II    Musculoskeletal:   Arthritis     Neurological:   Neuromuscular Disease, Headaches    Endocrine:   Hypothyroidism       Coronary atherosclerosis      Headache      Hypercholesterolemia      Myocardial infarction 2011            Past Surgical History:   Procedure Laterality Date    BLEPHAROPLASTY        COMPLETE LASER PHOTOVAPORIZATION OF PROSTATE        CORONARY STENT PLACEMENT   2011    INGUINAL HERNIA REPAIR           Physical Exam  General: Well nourished, Cooperative, Alert and Oriented    Airway:  Mallampati: II   Mouth Opening: Normal  TM Distance: Normal  Tongue: Normal  Neck ROM: Normal ROM    Dental:  Caps / Implants        Anesthesia Plan  Type of Anesthesia, risks & benefits discussed:    Anesthesia Type: MAC  Intra-op Monitoring Plan: Standard ASA Monitors  Post Op Pain Control Plan: multimodal analgesia and IV/PO Opioids PRN  Induction:  IV  Informed Consent: Informed consent signed with the Patient and all parties understand the risks and agree with anesthesia plan.  All questions answered.   ASA Score: 2  Day of Surgery Review of History & Physical: H&P Update referred to the surgeon/provider.I have interviewed and examined the patient. I have reviewed the patient's H&P dated: There are no significant changes. H&P completed by Anesthesiologist.    Ready For Surgery From Anesthesia Perspective.     .      
11-Jan-2021 21:35

## 2023-06-01 NOTE — TRANSFER OF CARE
"Anesthesia Transfer of Care Note    Patient: Thiago Thacker    Procedure(s) Performed: Procedure(s) (LRB):  EXTRACTION, CATARACT, WITH IOL INSERTION (Right)    Patient location: PACU    Anesthesia Type: MAC    Transport from OR: Transported from OR on room air with adequate spontaneous ventilation    Post pain: adequate analgesia    Post assessment: no apparent anesthetic complications    Post vital signs: stable    Level of consciousness: awake    Nausea/Vomiting: no nausea/vomiting    Transfer of care protocol was followed      Last vitals:   Visit Vitals  BP (!) 160/82 (BP Location: Left arm, Patient Position: Lying)   Pulse 71   Temp 37.2 °C (99 °F) (Temporal)   Resp 16   Ht 5' 11" (1.803 m)   Wt 81.6 kg (180 lb)   SpO2 100%   BMI 25.10 kg/m²     "

## 2023-06-01 NOTE — PROGRESS NOTES
Pt has met discharge criteria but ride is not available. Pt in bay with call light available. Instructed pt to call if he needs anything. No distress noted.

## 2023-06-01 NOTE — ANESTHESIA POSTPROCEDURE EVALUATION
Anesthesia Post Evaluation    Patient: Thiago Thacker    Procedure(s) Performed: Procedure(s) (LRB):  EXTRACTION, CATARACT, WITH IOL INSERTION (Right)    Final Anesthesia Type: MAC      Patient location during evaluation: PACU  Patient participation: Yes- Able to Participate  Level of consciousness: awake and alert  Post-procedure vital signs: reviewed and stable  Pain management: adequate  Airway patency: patent    PONV status at discharge: No PONV  Anesthetic complications: no      Cardiovascular status: blood pressure returned to baseline  Respiratory status: unassisted, spontaneous ventilation and room air  Hydration status: euvolemic  Follow-up not needed.          Vitals Value Taken Time   /79 06/01/23 0942   Temp 37.4 °C (99.3 °F) 06/01/23 0913   Pulse 59 06/01/23 0941   Resp 16 06/01/23 0940   SpO2 98 % 06/01/23 0941   Vitals shown include unvalidated device data.      No case tracking events are documented in the log.      Pain/Yesenia Score: Eysenia Score: 8 (6/1/2023  9:15 AM)

## 2023-06-01 NOTE — OP NOTE
DATE OF PROCEDURE: 06/01/2023    SURGEON: ISELA AQUINO MD    PREOPERATIVE DIAGNOSIS:  Senile nuclear sclerotic cataract right eye.     POSTOPERATIVE DIAGNOSIS: Senile nuclear sclerotic cataract right eye.     PROCEDURE PERFORMED:  Phacoemulsification with placement of intraocular lens, right eye.    IMPLANT:  DIBOO  14.0    ANESTHESIA:  Topical and MAC    COMPLICATIONS: none    ESTIMATED BLOOD LOSS: <1cc    SPECIMENS: none    INDICATIONS FOR PROCEDURE:   The patient has a history of painless progressive vision loss.  The patient has described difficulties with activities of daily living, which specifically include driving, which is secondary to cataract formation and progression. After we had a thorough discussion about risks, benefits, and alternatives to cataract surgery, the patient agreed to proceed with phacoemulsification and implantation of a lens in the right eye.  These risks include, but are not limited to, hemorrhage, pain, infection, need for additional surgery, need for glasses or contacts, loss of vision, or even loss of the eye.    PROCEDURE IN DETAIL:  The patient was met in the preop holding area.  Consent was confirmed to be signed.  The operative site was marked.  The patient was brought into the operating room by the anesthesia team and placed under monitored anesthesia care.  The right eye was prepped and draped in a sterile ophthalmic fashion.  A Phong speculum was placed into the right eye.   A paracentesis site was made and 1% preservative-free lidocaine was injected into the anterior chamber.  Viscoelastic  material was injected into the anterior chamber.  A keratome blade was used to make a clear corneal incision.  A cystotome was used to initiate the continuous curvilinear capsulorrhexis which was completed with Utrata forceps.  BSS on a hardwick cannula was used to perform hydrodissection.  The phacoemulsification tip was introduced into the eye and the nucleus was removed in a  standard divide-and-conquer fashion.  Remaining cortical material was removed from the eye using irrigation-aspiration.  The capsular bag was filled with viscoelastic material and the intraocular lens was injected and positioned into place. Remaining viscoelastic material was removed from the eye using irrigation and aspiration.  The corneal wounds were hydrated.  The eye was filled to physiologic pressure. The wounds were found to be watertight. Drops of Vigamox and prednisilone were placed into the eye.  The eye was washed, dried, and shielded.  The patient tolerated the procedure well and knows to follow up with me tomorrow morning, sooner if needed.

## 2023-06-01 NOTE — DISCHARGE SUMMARY
[facility]  Discharge Note  Short Stay    Procedure(s) (LRB):  EXTRACTION, CATARACT, WITH IOL INSERTION (Right)  BRIEF DISCHARGE NOTE:    Date of discharge: 06/01/2023    Reason for hospitalization -  Cataract surgery     Final Diagnosis - Visually significant Cataract    Procedures and treatment provided - Status post phacoemulsification with placement of intraocular lens     Diet - Advance to regular as tolerated    Activity - as tolerated    Disposition at the end of the case - Good.    Discharge: to home    The patient tolerated the procedure well and knows to follow up with me tomorrow morning in the eye clinic, sooner if needed.    Patient and family instructions (as appropriate) - Given to patient on discharge    Nessa Romo MD

## 2023-06-02 ENCOUNTER — OFFICE VISIT (OUTPATIENT)
Dept: OPHTHALMOLOGY | Facility: CLINIC | Age: 73
End: 2023-06-02
Payer: COMMERCIAL

## 2023-06-02 DIAGNOSIS — Z98.41 STATUS POST CATARACT EXTRACTION AND INSERTION OF INTRAOCULAR LENS, RIGHT: Primary | ICD-10-CM

## 2023-06-02 DIAGNOSIS — Z96.1 STATUS POST CATARACT EXTRACTION AND INSERTION OF INTRAOCULAR LENS, RIGHT: Primary | ICD-10-CM

## 2023-06-02 PROCEDURE — 1101F PT FALLS ASSESS-DOCD LE1/YR: CPT | Mod: CPTII,S$GLB,, | Performed by: OPHTHALMOLOGY

## 2023-06-02 PROCEDURE — 1126F AMNT PAIN NOTED NONE PRSNT: CPT | Mod: CPTII,S$GLB,, | Performed by: OPHTHALMOLOGY

## 2023-06-02 PROCEDURE — 99999 PR PBB SHADOW E&M-EST. PATIENT-LVL III: ICD-10-PCS | Mod: PBBFAC,,, | Performed by: OPHTHALMOLOGY

## 2023-06-02 PROCEDURE — 1159F MED LIST DOCD IN RCRD: CPT | Mod: CPTII,S$GLB,, | Performed by: OPHTHALMOLOGY

## 2023-06-02 PROCEDURE — 1159F PR MEDICATION LIST DOCUMENTED IN MEDICAL RECORD: ICD-10-PCS | Mod: CPTII,S$GLB,, | Performed by: OPHTHALMOLOGY

## 2023-06-02 PROCEDURE — 99999 PR PBB SHADOW E&M-EST. PATIENT-LVL III: CPT | Mod: PBBFAC,,, | Performed by: OPHTHALMOLOGY

## 2023-06-02 PROCEDURE — 1101F PR PT FALLS ASSESS DOC 0-1 FALLS W/OUT INJ PAST YR: ICD-10-PCS | Mod: CPTII,S$GLB,, | Performed by: OPHTHALMOLOGY

## 2023-06-02 PROCEDURE — 99024 POSTOP FOLLOW-UP VISIT: CPT | Mod: S$GLB,,, | Performed by: OPHTHALMOLOGY

## 2023-06-02 PROCEDURE — 1126F PR PAIN SEVERITY QUANTIFIED, NO PAIN PRESENT: ICD-10-PCS | Mod: CPTII,S$GLB,, | Performed by: OPHTHALMOLOGY

## 2023-06-02 PROCEDURE — 3288F FALL RISK ASSESSMENT DOCD: CPT | Mod: CPTII,S$GLB,, | Performed by: OPHTHALMOLOGY

## 2023-06-02 PROCEDURE — 99024 PR POST-OP FOLLOW-UP VISIT: ICD-10-PCS | Mod: S$GLB,,, | Performed by: OPHTHALMOLOGY

## 2023-06-02 PROCEDURE — 3288F PR FALLS RISK ASSESSMENT DOCUMENTED: ICD-10-PCS | Mod: CPTII,S$GLB,, | Performed by: OPHTHALMOLOGY

## 2023-06-02 NOTE — PROGRESS NOTES
HPI     - neuropsychology    S/p phaco with IOL OD 6/1/2023 - near  High Myopia   S/p lower lid bleph OU  NSC OS  Corneal Abnormality OS     GTTS:  AT's PRN OU  Systane gel qhs OU  Combo TID OD    Pt is here today for 1 day po cat sx OD. Pt states that OD vision is clear   but still blurry. Pt denies eye pain.       Last edited by Nessa Romo MD on 6/2/2023  3:00 PM.            Assessment /Plan     For exam results, see Encounter Report.    Status post cataract extraction and insertion of intraocular lens, right      Slit Lamp Exam  L/L - normal  C/s - quiet  Cornea - clear  A/C - 1+ cell  Lens - PCIOL    POD #1 s/p phaco/IOL  - doing well  - continue the following drops:    vigamox or ocuflox TID x 1 wk then stop  Pred forte TID x  4 wks  Ketorolac TID until runs out    Versus:    Combination drop - 1 drop TID x total of 1 month    Appropriate precautions and post op medications reviewed.  Patient instructed to call or come in if symptoms of redness, decreased vision, or pain are experienced.    -f/up 1-2 wks, sooner PRN. Or 4 wks with optom for mrx if needed.    Near    Cat OS - can sign consent next if ready to proceed.  If needed. Date?

## 2023-06-09 ENCOUNTER — OFFICE VISIT (OUTPATIENT)
Dept: OPHTHALMOLOGY | Facility: CLINIC | Age: 73
End: 2023-06-09
Payer: COMMERCIAL

## 2023-06-09 ENCOUNTER — PATIENT OUTREACH (OUTPATIENT)
Dept: ADMINISTRATIVE | Facility: HOSPITAL | Age: 73
End: 2023-06-09
Payer: COMMERCIAL

## 2023-06-09 DIAGNOSIS — Z96.1 STATUS POST CATARACT EXTRACTION AND INSERTION OF INTRAOCULAR LENS, RIGHT: Primary | ICD-10-CM

## 2023-06-09 DIAGNOSIS — Z98.41 STATUS POST CATARACT EXTRACTION AND INSERTION OF INTRAOCULAR LENS, RIGHT: Primary | ICD-10-CM

## 2023-06-09 DIAGNOSIS — H25.12 NUCLEAR SCLEROTIC CATARACT OF LEFT EYE: ICD-10-CM

## 2023-06-09 PROCEDURE — 3288F PR FALLS RISK ASSESSMENT DOCUMENTED: ICD-10-PCS | Mod: CPTII,S$GLB,, | Performed by: OPHTHALMOLOGY

## 2023-06-09 PROCEDURE — 99024 PR POST-OP FOLLOW-UP VISIT: ICD-10-PCS | Mod: S$GLB,,, | Performed by: OPHTHALMOLOGY

## 2023-06-09 PROCEDURE — 3288F FALL RISK ASSESSMENT DOCD: CPT | Mod: CPTII,S$GLB,, | Performed by: OPHTHALMOLOGY

## 2023-06-09 PROCEDURE — 1159F MED LIST DOCD IN RCRD: CPT | Mod: CPTII,S$GLB,, | Performed by: OPHTHALMOLOGY

## 2023-06-09 PROCEDURE — 92136 OPHTHALMIC BIOMETRY: CPT | Mod: LT,S$GLB,, | Performed by: OPHTHALMOLOGY

## 2023-06-09 PROCEDURE — 1159F PR MEDICATION LIST DOCUMENTED IN MEDICAL RECORD: ICD-10-PCS | Mod: CPTII,S$GLB,, | Performed by: OPHTHALMOLOGY

## 2023-06-09 PROCEDURE — 99999 PR PBB SHADOW E&M-EST. PATIENT-LVL III: CPT | Mod: PBBFAC,,, | Performed by: OPHTHALMOLOGY

## 2023-06-09 PROCEDURE — 1101F PT FALLS ASSESS-DOCD LE1/YR: CPT | Mod: CPTII,S$GLB,, | Performed by: OPHTHALMOLOGY

## 2023-06-09 PROCEDURE — 1126F PR PAIN SEVERITY QUANTIFIED, NO PAIN PRESENT: ICD-10-PCS | Mod: CPTII,S$GLB,, | Performed by: OPHTHALMOLOGY

## 2023-06-09 PROCEDURE — 99024 POSTOP FOLLOW-UP VISIT: CPT | Mod: S$GLB,,, | Performed by: OPHTHALMOLOGY

## 2023-06-09 PROCEDURE — 1101F PR PT FALLS ASSESS DOC 0-1 FALLS W/OUT INJ PAST YR: ICD-10-PCS | Mod: CPTII,S$GLB,, | Performed by: OPHTHALMOLOGY

## 2023-06-09 PROCEDURE — 99999 PR PBB SHADOW E&M-EST. PATIENT-LVL III: ICD-10-PCS | Mod: PBBFAC,,, | Performed by: OPHTHALMOLOGY

## 2023-06-09 PROCEDURE — 92136 IOL MASTER - OU - BOTH EYES: ICD-10-PCS | Mod: LT,S$GLB,, | Performed by: OPHTHALMOLOGY

## 2023-06-09 PROCEDURE — 1126F AMNT PAIN NOTED NONE PRSNT: CPT | Mod: CPTII,S$GLB,, | Performed by: OPHTHALMOLOGY

## 2023-06-09 NOTE — PROGRESS NOTES
Patient due for the following    TETANUS VACCINE     Shingles Vaccine (1 of 2)      Immunizations: reviewed and updated  Care Everywhere: triggered  Care Teams: up to date  Outreach: none needed    MSSP not seen outreach completed  Appointment completed 11/2023

## 2023-06-09 NOTE — PROGRESS NOTES
HPI     - neuropsychology    S/p phaco with IOL OD 6/1/2023 - near  High Myopia   S/p lower lid bleph OU  NSC OS  Corneal Abnormality OS     GTTS:  AT's PRN OU  Systane gel qhs OU  Combo TID OD    Pt is here today for 1 week po. Pt states that distance vision in a little   blurry. Pt denies eye pain.       Last edited by Carol Molina MA on 6/9/2023  3:32 PM.            Assessment /Plan     For exam results, see Encounter Report.    Status post cataract extraction and insertion of intraocular lens, right    Nuclear sclerotic cataract of left eye  -     IOL Master - OU - Both Eyes      PO week #1 s/p phaco/IOL -    - doing well, no issues    Continue combo drops for a total of 1 month versus: d/c abx gtt, continue PF/ketorolocTID for total of 1 month    - f/up 3-4 wks for MRx, DFE with optom, sooner PRN.      High Myopia     S/p lower lid bleph OU    Salzmanns nodules OS  - may be related to lower eyelid sx OS  - not VS, observe for now    Visually significant nuclear sclerotic cataract   - Interfering with activities of daily living.  Pt desires cataract surgery for [facility].   - R/B/A discussed and pt agrees to proceed with surgery.   - IOL options discussed according to patient's goals and concomitant ocular pathology; and pt content with monofocal lens.    - Target: near.       (Diboo 15.0 OS = near

## 2023-06-12 DIAGNOSIS — M25.562 CHRONIC PAIN OF LEFT KNEE: Primary | ICD-10-CM

## 2023-06-12 DIAGNOSIS — G89.29 CHRONIC PAIN OF LEFT KNEE: Primary | ICD-10-CM

## 2023-06-16 ENCOUNTER — PATIENT MESSAGE (OUTPATIENT)
Dept: ADMINISTRATIVE | Facility: OTHER | Age: 73
End: 2023-06-16
Payer: COMMERCIAL

## 2023-06-26 ENCOUNTER — HOSPITAL ENCOUNTER (OUTPATIENT)
Dept: RADIOLOGY | Facility: HOSPITAL | Age: 73
Discharge: HOME | End: 2023-06-26
Attending: ORTHOPAEDIC SURGERY
Payer: COMMERCIAL

## 2023-06-26 ENCOUNTER — TELEPHONE (OUTPATIENT)
Dept: SPORTS MEDICINE | Facility: CLINIC | Age: 73
End: 2023-06-26
Payer: COMMERCIAL

## 2023-06-26 ENCOUNTER — OFFICE VISIT (OUTPATIENT)
Dept: SPORTS MEDICINE | Facility: CLINIC | Age: 73
End: 2023-06-26
Payer: COMMERCIAL

## 2023-06-26 VITALS
WEIGHT: 189 LBS | DIASTOLIC BLOOD PRESSURE: 76 MMHG | BODY MASS INDEX: 26.46 KG/M2 | HEIGHT: 71 IN | HEART RATE: 78 BPM | SYSTOLIC BLOOD PRESSURE: 119 MMHG

## 2023-06-26 DIAGNOSIS — M17.12 PRIMARY OSTEOARTHRITIS OF LEFT KNEE: Primary | ICD-10-CM

## 2023-06-26 DIAGNOSIS — M25.562 LEFT KNEE PAIN, UNSPECIFIED CHRONICITY: ICD-10-CM

## 2023-06-26 DIAGNOSIS — M25.562 CHRONIC PAIN OF LEFT KNEE: ICD-10-CM

## 2023-06-26 DIAGNOSIS — G89.29 CHRONIC PAIN OF LEFT KNEE: ICD-10-CM

## 2023-06-26 PROCEDURE — 3074F SYST BP LT 130 MM HG: CPT | Mod: CPTII,S$GLB,, | Performed by: ORTHOPAEDIC SURGERY

## 2023-06-26 PROCEDURE — 99214 PR OFFICE/OUTPT VISIT, EST, LEVL IV, 30-39 MIN: ICD-10-PCS | Mod: S$GLB,,, | Performed by: ORTHOPAEDIC SURGERY

## 2023-06-26 PROCEDURE — 73564 X-RAY EXAM KNEE 4 OR MORE: CPT | Mod: TC,50

## 2023-06-26 PROCEDURE — 3288F FALL RISK ASSESSMENT DOCD: CPT | Mod: CPTII,S$GLB,, | Performed by: ORTHOPAEDIC SURGERY

## 2023-06-26 PROCEDURE — 73564 X-RAY EXAM KNEE 4 OR MORE: CPT | Mod: 26,,, | Performed by: RADIOLOGY

## 2023-06-26 PROCEDURE — 73564 XR KNEE ORTHO BILAT WITH FLEXION: ICD-10-PCS | Mod: 26,,, | Performed by: RADIOLOGY

## 2023-06-26 PROCEDURE — 3288F PR FALLS RISK ASSESSMENT DOCUMENTED: ICD-10-PCS | Mod: CPTII,S$GLB,, | Performed by: ORTHOPAEDIC SURGERY

## 2023-06-26 PROCEDURE — 3074F PR MOST RECENT SYSTOLIC BLOOD PRESSURE < 130 MM HG: ICD-10-PCS | Mod: CPTII,S$GLB,, | Performed by: ORTHOPAEDIC SURGERY

## 2023-06-26 PROCEDURE — 1101F PR PT FALLS ASSESS DOC 0-1 FALLS W/OUT INJ PAST YR: ICD-10-PCS | Mod: CPTII,S$GLB,, | Performed by: ORTHOPAEDIC SURGERY

## 2023-06-26 PROCEDURE — 3078F PR MOST RECENT DIASTOLIC BLOOD PRESSURE < 80 MM HG: ICD-10-PCS | Mod: CPTII,S$GLB,, | Performed by: ORTHOPAEDIC SURGERY

## 2023-06-26 PROCEDURE — 99214 OFFICE O/P EST MOD 30 MIN: CPT | Mod: S$GLB,,, | Performed by: ORTHOPAEDIC SURGERY

## 2023-06-26 PROCEDURE — 3008F BODY MASS INDEX DOCD: CPT | Mod: CPTII,S$GLB,, | Performed by: ORTHOPAEDIC SURGERY

## 2023-06-26 PROCEDURE — 3008F PR BODY MASS INDEX (BMI) DOCUMENTED: ICD-10-PCS | Mod: CPTII,S$GLB,, | Performed by: ORTHOPAEDIC SURGERY

## 2023-06-26 PROCEDURE — 1101F PT FALLS ASSESS-DOCD LE1/YR: CPT | Mod: CPTII,S$GLB,, | Performed by: ORTHOPAEDIC SURGERY

## 2023-06-26 PROCEDURE — 1159F PR MEDICATION LIST DOCUMENTED IN MEDICAL RECORD: ICD-10-PCS | Mod: CPTII,S$GLB,, | Performed by: ORTHOPAEDIC SURGERY

## 2023-06-26 PROCEDURE — 99999 PR PBB SHADOW E&M-EST. PATIENT-LVL IV: ICD-10-PCS | Mod: PBBFAC,,, | Performed by: ORTHOPAEDIC SURGERY

## 2023-06-26 PROCEDURE — 1125F PR PAIN SEVERITY QUANTIFIED, PAIN PRESENT: ICD-10-PCS | Mod: CPTII,S$GLB,, | Performed by: ORTHOPAEDIC SURGERY

## 2023-06-26 PROCEDURE — 1125F AMNT PAIN NOTED PAIN PRSNT: CPT | Mod: CPTII,S$GLB,, | Performed by: ORTHOPAEDIC SURGERY

## 2023-06-26 PROCEDURE — 99999 PR PBB SHADOW E&M-EST. PATIENT-LVL IV: CPT | Mod: PBBFAC,,, | Performed by: ORTHOPAEDIC SURGERY

## 2023-06-26 PROCEDURE — 1159F MED LIST DOCD IN RCRD: CPT | Mod: CPTII,S$GLB,, | Performed by: ORTHOPAEDIC SURGERY

## 2023-06-26 PROCEDURE — 3078F DIAST BP <80 MM HG: CPT | Mod: CPTII,S$GLB,, | Performed by: ORTHOPAEDIC SURGERY

## 2023-06-26 NOTE — PROGRESS NOTES
CC: Left knee pain    73 y.o. Patient returns with a new complaint of left knee pain.  Localized over the medial compartment primarily.  Bothers him intermittently with activity.  He begin running again on a treadmill and noticed some intermittent mechanical symptoms with pain.  Similar to what he experienced on the right side previously.  I saw him previously for right knee DJD.  Responded very well to prior Visco Euflexxa series.  He does report that he is taking a few medications designed for longevity.  These have been ordered online and not prescribed by a physician from what I can tell.  No prior injections or surgery for the left knee.  Treatment to this point has included oral medication, self-directed therapy, and rest.  No ipsilateral groin or hip pain.  No back or radicular symptoms.  States the right knee is doing well at this time.    REVIEW OF SYSTEMS:   Constitution: Negative. Negative for chills, fever and night sweats.    Hematologic/Lymphatic: Negative for bleeding problem. Does not bruise/bleed easily.   Skin: Negative for dry skin, itching and rash.   Musculoskeletal: Negative for falls. Positive for left knee pain and muscle weakness.     All other review of symptoms were reviewed and found to be noncontributory.     PAST MEDICAL HISTORY:   Past Medical History:   Diagnosis Date    Coronary atherosclerosis     Headache     Hypercholesterolemia     Myocardial infarction 2011     PAST SURGICAL HISTORY:   Past Surgical History:   Procedure Laterality Date    BLEPHAROPLASTY      CATARACT EXTRACTION W/  INTRAOCULAR LENS IMPLANT Right 6/1/2023    Procedure: EXTRACTION, CATARACT, WITH IOL INSERTION;  Surgeon: Nessa Romo MD;  Location: [facility];  Service: Ophthalmology;  Laterality: Right;    COMPLETE LASER PHOTOVAPORIZATION OF PROSTATE      CORONARY STENT PLACEMENT  2011    INGUINAL HERNIA REPAIR      vein       FAMILY HISTORY:   Family History   Problem Relation Age of Onset    Colon cancer  Mother 80    Heart attack Father 90     SOCIAL HISTORY:   Social History     Socioeconomic History    Marital status: Single   Tobacco Use    Smoking status: Never    Smokeless tobacco: Never   Substance and Sexual Activity    Alcohol use: Not Currently    Drug use: Never    Sexual activity: Not Currently     Partners: Female     MEDICATIONS:     Current Outpatient Medications:     aspirin (ECOTRIN) 81 MG EC tablet, Take 81 mg by mouth once daily., Disp: , Rfl:     atorvastatin (LIPITOR) 40 MG tablet, Take 1 tablet (40 mg total) by mouth once daily., Disp: 90 tablet, Rfl: 1    ergocalciferol (ERGOCALCIFEROL) 50,000 unit Cap, Take 50,000 Units by mouth once daily., Disp: , Rfl:     finasteride (PROSCAR) 5 mg tablet, Take 1 tablet (5 mg total) by mouth once daily., Disp: 90 tablet, Rfl: 1    GREEN TEA EXTRACT ORAL, Take 1 capsule by mouth once daily., Disp: , Rfl:     levothyroxine (SYNTHROID, LEVOTHROID) 175 MCG tablet, Take 1 tablet (175 mcg total) by mouth before breakfast., Disp: 90 tablet, Rfl: 1    multivitamin (THERAGRAN) per tablet, Take 1 tablet by mouth once daily., Disp: , Rfl:     omega-3/dha/epa/fish oil (OMEGA-3 FISH OIL ORAL), Take 4 g by mouth once daily., Disp: , Rfl:     prednisolon/gatiflox/bromfenac (PREDNISOLONE-GATIFLOX-BROMFENAC) 1-0.5-0.09 % ophthalmic solution, Place 1 drop into the right eye 3 (three) times daily., Disp: 5 mL, Rfl: 3    QUERCETIN DIHYDRATE, BULK, MISC, by Misc.(Non-Drug; Combo Route) route. Quercetin, Disp: , Rfl:     RESVERATROL ORAL, Take by mouth. Resveratrol (Longevinex formulation), Disp: , Rfl:     sirolimus (RAPAMUNE) 1 MG Tab, Take 10 mg by mouth once a week., Disp: , Rfl:     tadalafiL (CIALIS) 5 MG tablet, Take 1 tablet (5 mg total) by mouth daily as needed for Erectile Dysfunction., Disp: 30 tablet, Rfl: 1    turmeric (CURCUMIN MISC), by Misc.(Non-Drug; Combo Route) route., Disp: , Rfl:     mirabegron (MYRBETRIQ) 50 mg Tb24, , Disp: , Rfl:      "rutin/quercetin/bioflav/bilber (BILBERRY EXTRACT ORAL), Take 1 capsule by mouth once daily., Disp: , Rfl:     tiZANidine (ZANAFLEX) 4 MG tablet, Take 0.5-1 tablets (2-4 mg total) by mouth nightly as needed (muscular spasm)., Disp: 30 tablet, Rfl: 3    UNABLE TO FIND, Fisetin, Disp: , Rfl:     VITAMIN K2 ORAL, , Disp: , Rfl:   No current facility-administered medications for this visit.    Facility-Administered Medications Ordered in Other Visits:     balanced salt irrigation intra-ocular solution 1 drop, 1 drop, Right Eye, On Call Procedure, Nessa Romo MD    phenylephrine HCL 10% ophthalmic solution 1 drop, 1 drop, Right Eye, PRN, Nessa Romo MD    sodium chloride 0.9% flush 2 mL, 2 mL, Intravenous, PRN, Nessa Romo MD    ALLERGIES:   Review of patient's allergies indicates:   Allergen Reactions    Celebrex [celecoxib] Rash      PHYSICAL EXAMINATION:  /76 (BP Location: Right arm, Patient Position: Sitting, BP Method: Medium (Automatic))   Pulse 78   Ht 5' 11" (1.803 m)   Wt 85.7 kg (189 lb)   BMI 26.36 kg/m²   General: Well-developed well-nourished 73 y.o. malein no acute distress   Cardiovascular: Regular rhythm by palpation of distal pulse, normal color and temperature, no concerning varicosities on symptomatic side   Lungs: No labored breathing or wheezing appreciated   Neuro: Alert and oriented ×3   Psychiatric: well oriented to person, place and time, demonstrates normal mood and affect   Skin: No rashes, lesions or ulcers, normal temperature, turgor, and texture on involved extremity    Ortho/SPM Exam  Examination of the left knee demonstrates standing varus alignment.  Pain over the medial joint line to direct palpation.  Pain medially with varus load.  Mildly positive patellar crepitus and grind.  Stable to varus and valgus stress.  No effusion.  No significant peripheral vascular disease.  2+ dorsalis pedis pulse.  No pain with passive internal and external rotation of the " hip.    IMAGING:  X-rays including standing, weight bearing AP and flexion bilateral knees, LEFT knee lateral and sunrise views ordered and images reviewed by me show:    Degenerative changes with some early possible medial joint space narrowing and marginal osteophyte formation. KL2    ASSESSMENT:      ICD-10-CM ICD-9-CM   1. Primary osteoarthritis of left knee  M17.12 715.16   2. Chronic pain of left knee  M25.562 719.46    G89.29 338.29       PLAN:     Findings discussed with the patient.  Most consistent with symptomatic degenerative changes with underlying osteoarthritis and likely meniscus tear.  He did very well with Euflexxa series in the right knee and we plan to do that for the left knee as well at this time.  Otherwise we have discussed low-impact cardio exercise.  Modulating activities.  Return to clinic for the Visco series.    Procedures

## 2023-06-26 NOTE — TELEPHONE ENCOUNTER
----- Message from Norma Nicole sent at 6/26/2023  8:36 AM CDT -----  Regarding: canceled x-ray  Contact: pt  Pt calling in regards to his x-ray appt that was canceled , please call       Confirmed patient's contact info below:  Contact Name: Thiago Thacker  Phone Number: [phone]

## 2023-06-27 ENCOUNTER — TELEPHONE (OUTPATIENT)
Dept: SPORTS MEDICINE | Facility: CLINIC | Age: 73
End: 2023-06-27
Payer: COMMERCIAL

## 2023-06-29 ENCOUNTER — TELEPHONE (OUTPATIENT)
Dept: OPHTHALMOLOGY | Facility: CLINIC | Age: 73
End: 2023-06-29
Payer: COMMERCIAL

## 2023-06-29 DIAGNOSIS — H25.12 NUCLEAR SCLEROTIC CATARACT OF LEFT EYE: Primary | ICD-10-CM

## 2023-06-29 RX ORDER — PREDNISOLONE ACETATE-GATIFLOXACIN-BROMFENAC .75; 5; 1 MG/ML; MG/ML; MG/ML
1 SUSPENSION/ DROPS OPHTHALMIC 3 TIMES DAILY
Qty: 5 ML | Refills: 3 | Status: SHIPPED | OUTPATIENT
Start: 2023-06-29 | End: 2023-07-10

## 2023-07-06 NOTE — PROGRESS NOTES
CC: Right knee pain; Left knee injection    Thiago for a scheduled left knee Euflexxa injection.  He also reports interested in repeating a right knee Euflexxa series.  This was done about a year ago.  He has known DJD in the right knee as well.    Prior History 5/2022:  72 y.o. Male who returns to me for a follow up of right knee pain.   I saw him previously for this.  Prior MRI showed meniscus pathology in addition to primary patellofemoral osteoarthritis and chondral wear.  There is subchondral bone marrow edema present. Had some relief with PT, Celebrex and CSI on 2/7/2022.  He continues to have some intermittent activity-related pain, worse with going up and down inclines, hiking, deep squats.  Better with rest.  He is frustrated with his symptoms.  He also describes subjective stiffness.  Denies any recent effusions.  No prominent mechanical complaints.    He is originally from [place] and has read about a meniscus transplant option.  He wants to discuss that today.    REVIEW OF SYSTEMS:   Constitution: Negative. Negative for chills, fever and night sweats.    Hematologic/Lymphatic: Negative for bleeding problem. Does not bruise/bleed easily.   Skin: Negative for dry skin, itching and rash.   Musculoskeletal: Negative for falls. Positive for right and left knee pain and muscle weakness.     All other review of symptoms were reviewed and found to be noncontributory.     PAST MEDICAL HISTORY:   History reviewed. No pertinent past medical history.    PAST SURGICAL HISTORY:   Past Surgical History:   Procedure Laterality Date    BLEPHAROPLASTY      COMPLETE LASER PHOTOVAPORIZATION OF PROSTATE      INGUINAL HERNIA REPAIR      vein       FAMILY HISTORY:   Family History   Problem Relation Age of Onset    Colon cancer Mother 80     SOCIAL HISTORY:   Social History     Socioeconomic History    Marital status: Single   Tobacco Use    Smoking status: Never Smoker    Smokeless tobacco: Never Used   Substance and  Sexual Activity    Alcohol use: Yes     Alcohol/week: 1.0 standard drink     Types: 1 Glasses of wine per week    Drug use: Never    Sexual activity: Not Currently     Partners: Female     MEDICATIONS:     Current Outpatient Medications:     arginine/ornithine/lysine (ARGININE-LYSINE-ORNITHINE ORAL), Take 1 capsule by mouth once daily., Disp: , Rfl:     ascorbate calcium (RUPERT-C ORAL), Take 1 capsule by mouth once daily., Disp: , Rfl:     aspirin (ECOTRIN) 81 MG EC tablet, Take 81 mg by mouth once daily., Disp: , Rfl:     atorvastatin (LIPITOR) 40 MG tablet, Take 1 tablet (40 mg total) by mouth once daily., Disp: 90 tablet, Rfl: 1    finasteride (PROSCAR) 5 mg tablet, Take 1 tablet (5 mg total) by mouth once daily., Disp: 90 tablet, Rfl: 1    glucos-msm-collagen-C-Mn-hrb21 500-333-5 mg Cap, Take 1 capsule by mouth once daily., Disp: , Rfl:     GLYCINE ORAL, Take 1 capsule by mouth once daily. Glycine + N-Acetyl-Cysteine, Disp: , Rfl:     GREEN TEA EXTRACT ORAL, Take 1 capsule by mouth once daily., Disp: , Rfl:     levothyroxine (SYNTHROID, LEVOTHROID) 175 MCG tablet, Take 1 tablet (175 mcg total) by mouth before breakfast., Disp: 30 tablet, Rfl: 11    multivitamin (THERAGRAN) per tablet, Take 1 tablet by mouth once daily., Disp: , Rfl:     omega-3/dha/epa/fish oil (OMEGA-3 FISH OIL ORAL), Take 4 g by mouth once daily., Disp: , Rfl:     QUERCETIN DIHYDRATE, BULK, MISC, by Misc.(Non-Drug; Combo Route) route. Quercetin, Disp: , Rfl:     RESVERATROL ORAL, Take by mouth. Resveratrol (Longevinex formulation), Disp: , Rfl:     rutin/quercetin/bioflav/bilber (BILBERRY EXTRACT ORAL), Take 1 capsule by mouth once daily., Disp: , Rfl:     tadalafiL (CIALIS) 5 MG tablet, Take 1 tablet (5 mg total) by mouth daily as needed for Erectile Dysfunction., Disp: 90 tablet, Rfl: 1    turmeric (CURCUMIN MISC), by Misc.(Non-Drug; Combo Route) route., Disp: , Rfl:     ubidecarenone (COQ-10 ORAL), Take 1 capsule by  "mouth once daily., Disp: , Rfl:     vit C/E/Zn/coppr/lutein/zeaxan (PRESERVISION AREDS-2 ORAL), Take by mouth. AREDS 2 (B & L, Disp: , Rfl:     celecoxib (CELEBREX) 200 MG capsule, Take 1 capsule (200 mg total) by mouth once daily., Disp: 60 capsule, Rfl: 2    levothyroxine 175 mcg Cap, Take 175 mcg by mouth once daily., Disp: 90 capsule, Rfl: 1    ALLERGIES:   Review of patient's allergies indicates:  No Known Allergies     PHYSICAL EXAMINATION:  /87   Pulse 63   Ht 5' 11" (1.803 m)   Wt 85.7 kg (189 lb)   BMI 26.36 kg/m²   General: Well-developed well-nourished 72 y.o. malein no acute distress   Cardiovascular: Regular rhythm by palpation of distal pulse, normal color and temperature, no concerning varicosities on symptomatic side   Lungs: No labored breathing or wheezing appreciated   Neuro: Alert and oriented ×3   Psychiatric: well oriented to person, place and time, demonstrates normal mood and affect   Skin: No rashes, lesions or ulcers, normal temperature, turgor, and texture on involved extremity    Ortho/SPM Exam  Examination of the right knee again demonstrates positive patellar crepitus and grind.  Positive peripatellar soft tissue tenderness and pain over the patellar facets.  Minimal tenderness over the medial joint line.  No pain specifically with Jose Manuel's testing.  No pain medially with forced flexion or extension.  5° short of full extension, flexion to 135°.  Ligamentously stable.    IMAGING:  Prior X-rays including standing, weight bearing AP and flexion bilateral knees, RIGHT knee lateral and sunrise views ordered and images reviewed by me show:     No fracture or dislocation.  No joint effusion.  Patellofemoral joint space narrowing predominantly medially with medial and lateral joint space narrowing. KL3.      MRI right knee (1/2022) reviewed by me and discussed with patient. Study shows:  1. Complex and horizontal tears body segment/posterior horn medial meniscus with surrounding " synovitis.  2. Edema signal about the superficial and deep MCL, likely reactive to aforementioned medial meniscus tear.  Sequela of a sprain can present with similar findings but is felt to be less likely given absent history of trauma.  3. Tricompartmental osteoarthritis most pronounced within the patellofemoral and medial tibiofemoral compartments.  4. Proximal patellar tendinosis.  5. Quadriceps fat pad edema with a posterior convex margin, findings that can be seen in the setting of quadriceps fat pad impingement.  6. Small joint effusion with synovitis.     ASSESSMENT:    Right knee osteoarthritis, predominant patellofemoral    PLAN:     We will go ahead and set him up to have a Euflexxa series as well on the right side.  When he returns for the 2nd injection of his left knee we will begin the right knee Euflexxa series.  Diagnosis of symptomatic DJD bilaterally.    Patient is here for follow up of his knee left chondromalacia and arthritis. He is here today for a planned Euflexxa Injection (1/3) (LOT #[id number], exp 5/26/2024). He understands potential risks and benefits of the procedure which includes pseudoseptic reaction and pain. He has failed conservative management to this point for his knee pain including NSAIDS.    Exam findings remain unchanged from most recent visit. No erythema, warmth, or signs of infection.     The patient tolerated the procedure well. We discussed post-injection care of the knee. Plan for continued conservative management as discussed before. All questions were answered.       Large Joint Aspiration/Injection: L knee    Date/Time: 7/10/2023 2:00 PM  Performed by: JEREMI King MD  Authorized by: JEREMI King MD     Consent Done?:  Yes (Verbal)  Indications:  Pain  Site marked: the procedure site was marked    Timeout: prior to procedure the correct patient, procedure, and site was verified    Prep: patient was prepped and draped in usual sterile fashion      Local  anesthesia used?: Yes    Local anesthetic:  Co-phenylcaine spray    Details:  Needle Size:  22 G  Ultrasonic Guidance for needle placement?: No    Approach:  Lateral  Location:  Knee  Site:  L knee  Medications:  20 mg sodium hyaluronate (EUFLEXXA) 10 mg/mL(mw 2.4 -3.6 million)  Patient tolerance:  Patient tolerated the procedure well with no immediate complications

## 2023-07-07 NOTE — PROGRESS NOTES
Thiago Thacker  1950        Subjective     Chief Complaint: F/u    History of Present Illness:  Mr. Thiago Thacker is a 73 y.o. male who presents to clinic for f/u.    Has two inguinal hernia repairs. One was in [place] in 2015. Laparoscopically. On left. The one on the right side was done but open, in [place] in 2018. Feels like the one on the right lower is painful/burning, feels like mesh is having problem. Scheduled to see General Surgery 7/26.    Mom had colon cancer. Had to cancel colonoscopy due to interview. Needs to re-schedule. Last one was done in [place]. [address]. In 2018. No change in bowels or blood in stool.     Cataracts- had one done, second one scheduled in August.    Knee Pain- bilateral, gets injections, seeing Sports Medicine.    Still on sirolimus for clinical trial for aging. Understands this is not studied.    Platelets normal on last check, can re-check.     On baby Aspirin and statin.     Review of Systems   Constitutional:  Positive for malaise/fatigue. Negative for fever and weight loss.   Respiratory:  Negative for shortness of breath.    Cardiovascular:  Negative for chest pain.   Gastrointestinal:  Positive for abdominal pain. Negative for blood in stool, constipation, diarrhea, melena, nausea and vomiting.   Genitourinary:  Negative for dysuria, frequency and hematuria.   Musculoskeletal:  Positive for joint pain. Negative for myalgias.   Neurological:  Negative for speech change, focal weakness and headaches.      PAST HISTORY:     Past Medical History:   Diagnosis Date    Coronary atherosclerosis     Headache     Hypercholesterolemia     Myocardial infarction 2011       Past Surgical History:   Procedure Laterality Date    BLEPHAROPLASTY      CATARACT EXTRACTION W/  INTRAOCULAR LENS IMPLANT Right 6/1/2023    Procedure: EXTRACTION, CATARACT, WITH IOL INSERTION;  Surgeon: Nessa Romo MD;  Location: [facility];  Service: Ophthalmology;  Laterality: Right;    COMPLETE LASER  PHOTOVAPORIZATION OF PROSTATE      CORONARY STENT PLACEMENT  2011    INGUINAL HERNIA REPAIR      vein         Family History   Problem Relation Age of Onset    Colon cancer Mother 80    Heart attack Father 90       Social History     Socioeconomic History    Marital status: Single   Tobacco Use    Smoking status: Never    Smokeless tobacco: Never   Substance and Sexual Activity    Alcohol use: Not Currently    Drug use: Never    Sexual activity: Not Currently     Partners: Female       MEDICATIONS & ALLERGIES:     Current Outpatient Medications on File Prior to Visit   Medication Sig    acarbose (PRECOSE) 25 MG Tab     alpha lipoic acid 200 mg Tab     aspirin (ECOTRIN) 81 MG EC tablet Take 81 mg by mouth once daily.    atorvastatin (LIPITOR) 40 MG tablet Take 1 tablet (40 mg total) by mouth once daily.    ergocalciferol (ERGOCALCIFEROL) 50,000 unit Cap Take 50,000 Units by mouth once daily.    finasteride (PROSCAR) 5 mg tablet Take 1 tablet (5 mg total) by mouth once daily.    GREEN TEA EXTRACT ORAL Take 1 capsule by mouth once daily.    levothyroxine (SYNTHROID, LEVOTHROID) 175 MCG tablet Take 1 tablet (175 mcg total) by mouth before breakfast.    magnesium 250 mg Tab     multivitamin (THERAGRAN) per tablet Take 1 tablet by mouth once daily.    OMEGA-3-DHA-EPA-FISH OIL ORAL Take 4 g by mouth.    omega-3/dha/epa/fish oil (OMEGA-3 FISH OIL ORAL) Take 4 g by mouth once daily.    pumpkin seed oil/saw palmetto (SAW PALMETTO-PUMPKIN SEED OIL ORAL)     QUERCETIN DIHYDRATE, BULK, MISC by Misc.(Non-Drug; Combo Route) route. Quercetin    RESVERATROL ORAL Take by mouth. Resveratrol (Longevinex formulation)    sirolimus (RAPAMUNE) 1 MG Tab Take 10 mg by mouth once a week.    tadalafiL (CIALIS) 5 MG tablet Take 1 tablet (5 mg total) by mouth daily as needed for Erectile Dysfunction.    taurine 1,000 mg Cap     turmeric (CURCUMIN MISC) by Misc.(Non-Drug; Combo Route) route.    [DISCONTINUED] mirabegron (MYRBETRIQ) 50 mg Tb24      "[DISCONTINUED] prednisolon/gatiflox/bromfenac (PREDNISOL ACE-GATIFLOX-BROMFEN) 1-0.5-0.075 % DrpS Apply 1 drop to eye 3 (three) times daily.    [DISCONTINUED] prednisolon/gatiflox/bromfenac (PREDNISOLONE-GATIFLOX-BROMFENAC) 1-0.5-0.09 % ophthalmic solution Place 1 drop into the right eye 3 (three) times daily.    [DISCONTINUED] rutin/quercetin/bioflav/bilber (BILBERRY EXTRACT ORAL) Take 1 capsule by mouth once daily.    [DISCONTINUED] tiZANidine (ZANAFLEX) 4 MG tablet Take 0.5-1 tablets (2-4 mg total) by mouth nightly as needed (muscular spasm).    [DISCONTINUED] UNABLE TO FIND Fisetin    [DISCONTINUED] VITAMIN K2 ORAL      Current Facility-Administered Medications on File Prior to Visit   Medication    balanced salt irrigation intra-ocular solution 1 drop    phenylephrine HCL 10% ophthalmic solution 1 drop    sodium chloride 0.9% flush 2 mL       Review of patient's allergies indicates:   Allergen Reactions    Celebrex [celecoxib] Rash       OBJECTIVE:     Vital Signs:  Vitals:    07/10/23 0933   BP: 118/72   BP Location: Right arm   Patient Position: Sitting   BP Method: Medium (Manual)   Pulse: (!) 58   SpO2: 98%   Weight: 85.6 kg (188 lb 11.4 oz)   Height: 5' 11" (1.803 m)       Body mass index is 26.32 kg/m².     Physical Exam:  Physical Exam  Vitals and nursing note reviewed.   Constitutional:       General: He is not in acute distress.     Appearance: Normal appearance. He is not ill-appearing, toxic-appearing or diaphoretic.   HENT:      Head: Normocephalic and atraumatic.        Comments: Small, flat, scattered, red bumps on area of shaved hairline  Eyes:      General: No scleral icterus.     Conjunctiva/sclera: Conjunctivae normal.   Cardiovascular:      Rate and Rhythm: Regular rhythm.      Heart sounds: No murmur heard.  Pulmonary:      Effort: Pulmonary effort is normal. No respiratory distress.      Breath sounds: Normal breath sounds. No wheezing.   Abdominal:      General: There is no distension.     "  Palpations: Abdomen is soft. There is mass.      Tenderness: There is no abdominal tenderness. There is no guarding or rebound.          Comments: Soft area of outpouching, non tender, not stuck   Musculoskeletal:      Right lower leg: No edema.      Left lower leg: No edema.   Skin:     General: Skin is warm and dry.   Neurological:      Mental Status: He is alert and oriented to person, place, and time.   Psychiatric:         Mood and Affect: Affect normal.          Laboratory  Lab Results   Component Value Date    WBC 4.2 11/25/2022    HGB 14.4 11/25/2022    HCT 42.8 11/25/2022    MCV 92.8 11/25/2022     11/25/2022     Lab Results   Component Value Date    GLU 98 01/25/2022     01/25/2022    K 4.1 01/25/2022     01/25/2022    CO2 26 01/25/2022    BUN 25 (H) 01/25/2022    CREATININE 0.9 07/13/2022    CALCIUM 9.5 01/25/2022     No results found for: INR, PROTIME  No results found for: HGBA1C        Health Maintenance         Date Due Completion Date    TETANUS VACCINE Never done ---    Shingles Vaccine (1 of 2) Never done ---    Influenza Vaccine (1) 09/01/2023 9/14/2022    COVID-19 Vaccine (6 - Pfizer series) 09/01/2023 5/1/2023    Aspirin/Antiplatelet Therapy 07/10/2024 7/10/2023    Lipid Panel 01/25/2027 1/25/2022    Colorectal Cancer Screening 01/09/2028 1/9/2018              ASSESSMENT & PLAN:   Mr. Thiago Thacker is a 73 y.o. male who was seen today in clinic for concern for mesh issue from prior hernia surgery.      1. Hernia of abdominal cavity  -     Cancel: US Abdomen Complete; Future; Expected date: 07/10/2023  -     US Soft Tissue, Groin Left; Future; Expected date: 07/10/2023    2. Encounter for colorectal cancer screening  -     Ambulatory referral/consult to Endo Procedure ; Future; Expected date: 07/11/2023    3. Hypothyroidism, unspecified type    4. Family history of colon cancer  -     Ambulatory referral/consult to Endo Procedure ; Future; Expected date:  07/11/2023    5. Thrombocytopenia  -     CBC Auto Differential; Future; Expected date: 07/10/2023    6. Hx of myocardial infarction    7. Rash of neck  -     Ambulatory referral/consult to Dermatology; Future; Expected date: 07/17/2023  -     Discontinue: triamcinolone acetonide 0.1% (KENALOG) 0.1 % ointment; Apply topically 2 (two) times daily.  Dispense: 15 g; Refill: 0  -     triamcinolone acetonide 0.1% (KENALOG) 0.1 % ointment; Apply topically 2 (two) times daily.  Dispense: 15 g; Refill: 0           Kristyn Reyes MD  Internal Medicine         Portions of this note may have been generated using voice recognition software.  Please excuse any spelling/grammatical errors. Occasional wrong-word or sound-a-like substitutions may have also occurred due to the inherent limitations of voice recognition software. Please read the chart carefully and recognize, using context, where substitutions have occurred.    Answers submitted by the patient for this visit:  Abdominal Pain Questionnaire (Submitted on 7/3/2023)  Chief Complaint: Abdominal pain  Chronicity: new  Onset: more than 1 month ago  Onset quality: gradual  Frequency: daily  Episode duration: 1 Hours  Pain location: right flank  Pain - numeric: 2/10  Pain quality: burning  Radiates to: RUQ  anorexia: No  arthralgias: No  belching: No  flatus: No  hematochezia: No  Aggravated by: certain positions  Relieved by: certain positions  abdominal surgery: Yes  colon cancer: No  Crohn's disease: No  gallstones: No  GERD: No  irritable bowel syndrome: No  kidney stones: No  pancreatitis: No  PUD: No  ulcerative colitis: No  UTI: Yes

## 2023-07-10 ENCOUNTER — LAB VISIT (OUTPATIENT)
Dept: LAB | Facility: HOSPITAL | Age: 73
End: 2023-07-10
Attending: STUDENT IN AN ORGANIZED HEALTH CARE EDUCATION/TRAINING PROGRAM
Payer: COMMERCIAL

## 2023-07-10 ENCOUNTER — OFFICE VISIT (OUTPATIENT)
Dept: SPORTS MEDICINE | Facility: CLINIC | Age: 73
End: 2023-07-10
Payer: COMMERCIAL

## 2023-07-10 ENCOUNTER — OFFICE VISIT (OUTPATIENT)
Dept: PRIMARY CARE CLINIC | Facility: CLINIC | Age: 73
End: 2023-07-10
Payer: COMMERCIAL

## 2023-07-10 ENCOUNTER — OFFICE VISIT (OUTPATIENT)
Dept: DERMATOLOGY | Facility: CLINIC | Age: 73
End: 2023-07-10
Payer: COMMERCIAL

## 2023-07-10 VITALS
DIASTOLIC BLOOD PRESSURE: 80 MMHG | HEIGHT: 71 IN | BODY MASS INDEX: 26.41 KG/M2 | HEART RATE: 57 BPM | WEIGHT: 188.63 LBS | SYSTOLIC BLOOD PRESSURE: 135 MMHG

## 2023-07-10 VITALS
BODY MASS INDEX: 26.42 KG/M2 | WEIGHT: 188.69 LBS | HEIGHT: 71 IN | DIASTOLIC BLOOD PRESSURE: 72 MMHG | HEART RATE: 58 BPM | OXYGEN SATURATION: 98 % | SYSTOLIC BLOOD PRESSURE: 118 MMHG

## 2023-07-10 DIAGNOSIS — M17.12 PRIMARY OSTEOARTHRITIS OF LEFT KNEE: ICD-10-CM

## 2023-07-10 DIAGNOSIS — L73.9 FOLLICULITIS: ICD-10-CM

## 2023-07-10 DIAGNOSIS — D69.6 THROMBOCYTOPENIA: ICD-10-CM

## 2023-07-10 DIAGNOSIS — E03.9 HYPOTHYROIDISM, UNSPECIFIED TYPE: ICD-10-CM

## 2023-07-10 DIAGNOSIS — Z12.11 ENCOUNTER FOR COLORECTAL CANCER SCREENING: ICD-10-CM

## 2023-07-10 DIAGNOSIS — E78.49 OTHER HYPERLIPIDEMIA: ICD-10-CM

## 2023-07-10 DIAGNOSIS — R21 RASH OF NECK: ICD-10-CM

## 2023-07-10 DIAGNOSIS — I25.2 HX OF MYOCARDIAL INFARCTION: ICD-10-CM

## 2023-07-10 DIAGNOSIS — Z76.89 ENCOUNTER FOR SKIN CARE: ICD-10-CM

## 2023-07-10 DIAGNOSIS — Z12.12 ENCOUNTER FOR COLORECTAL CANCER SCREENING: ICD-10-CM

## 2023-07-10 DIAGNOSIS — M17.11 PRIMARY OSTEOARTHRITIS OF RIGHT KNEE: Primary | ICD-10-CM

## 2023-07-10 DIAGNOSIS — L53.9 ERYTHEMA: ICD-10-CM

## 2023-07-10 DIAGNOSIS — L80 VITILIGO: Primary | ICD-10-CM

## 2023-07-10 DIAGNOSIS — Z80.0 FAMILY HISTORY OF COLON CANCER: ICD-10-CM

## 2023-07-10 DIAGNOSIS — K46.9 HERNIA OF ABDOMINAL CAVITY: Primary | ICD-10-CM

## 2023-07-10 PROBLEM — R06.83 SNORING: Status: RESOLVED | Noted: 2023-01-30 | Resolved: 2023-07-10

## 2023-07-10 PROBLEM — M25.561 ACUTE PAIN OF RIGHT KNEE: Status: RESOLVED | Noted: 2022-02-08 | Resolved: 2023-07-10

## 2023-07-10 LAB
BASOPHILS # BLD AUTO: 0.02 K/UL (ref 0–0.2)
BASOPHILS NFR BLD: 0.7 % (ref 0–1.9)
DIFFERENTIAL METHOD: ABNORMAL
EOSINOPHIL # BLD AUTO: 0 K/UL (ref 0–0.5)
EOSINOPHIL NFR BLD: 1 % (ref 0–8)
ERYTHROCYTE [DISTWIDTH] IN BLOOD BY AUTOMATED COUNT: 12.2 % (ref 11.5–14.5)
HCT VFR BLD AUTO: 43.8 % (ref 40–54)
HGB BLD-MCNC: 14.4 G/DL (ref 14–18)
IMM GRANULOCYTES # BLD AUTO: 0 K/UL (ref 0–0.04)
IMM GRANULOCYTES NFR BLD AUTO: 0 % (ref 0–0.5)
LYMPHOCYTES # BLD AUTO: 0.7 K/UL (ref 1–4.8)
LYMPHOCYTES NFR BLD: 23 % (ref 18–48)
MCH RBC QN AUTO: 30.8 PG (ref 27–31)
MCHC RBC AUTO-ENTMCNC: 32.9 G/DL (ref 32–36)
MCV RBC AUTO: 94 FL (ref 82–98)
MONOCYTES # BLD AUTO: 0.4 K/UL (ref 0.3–1)
MONOCYTES NFR BLD: 11.5 % (ref 4–15)
NEUTROPHILS # BLD AUTO: 1.9 K/UL (ref 1.8–7.7)
NEUTROPHILS NFR BLD: 63.8 % (ref 38–73)
NRBC BLD-RTO: 0 /100 WBC
PLATELET # BLD AUTO: 103 K/UL (ref 150–450)
PMV BLD AUTO: 11.8 FL (ref 9.2–12.9)
RBC # BLD AUTO: 4.68 M/UL (ref 4.6–6.2)
WBC # BLD AUTO: 3.04 K/UL (ref 3.9–12.7)

## 2023-07-10 PROCEDURE — 99999 PR PBB SHADOW E&M-EST. PATIENT-LVL III: CPT | Mod: PBBFAC,,, | Performed by: DERMATOLOGY

## 2023-07-10 PROCEDURE — 99999 PR PBB SHADOW E&M-EST. PATIENT-LVL III: ICD-10-PCS | Mod: PBBFAC,,, | Performed by: ORTHOPAEDIC SURGERY

## 2023-07-10 PROCEDURE — 1159F MED LIST DOCD IN RCRD: CPT | Mod: CPTII,S$GLB,, | Performed by: DERMATOLOGY

## 2023-07-10 PROCEDURE — 36415 COLL VENOUS BLD VENIPUNCTURE: CPT | Mod: PN | Performed by: STUDENT IN AN ORGANIZED HEALTH CARE EDUCATION/TRAINING PROGRAM

## 2023-07-10 PROCEDURE — 99214 OFFICE O/P EST MOD 30 MIN: CPT | Mod: S$GLB,,, | Performed by: STUDENT IN AN ORGANIZED HEALTH CARE EDUCATION/TRAINING PROGRAM

## 2023-07-10 PROCEDURE — 3008F BODY MASS INDEX DOCD: CPT | Mod: CPTII,S$GLB,, | Performed by: STUDENT IN AN ORGANIZED HEALTH CARE EDUCATION/TRAINING PROGRAM

## 2023-07-10 PROCEDURE — 99999 PR PBB SHADOW E&M-EST. PATIENT-LVL V: ICD-10-PCS | Mod: PBBFAC,,, | Performed by: STUDENT IN AN ORGANIZED HEALTH CARE EDUCATION/TRAINING PROGRAM

## 2023-07-10 PROCEDURE — 1160F RVW MEDS BY RX/DR IN RCRD: CPT | Mod: CPTII,S$GLB,, | Performed by: DERMATOLOGY

## 2023-07-10 PROCEDURE — 80061 LIPID PANEL: CPT | Performed by: STUDENT IN AN ORGANIZED HEALTH CARE EDUCATION/TRAINING PROGRAM

## 2023-07-10 PROCEDURE — 20610 LARGE JOINT ASPIRATION/INJECTION: L KNEE: ICD-10-PCS | Mod: LT,S$GLB,, | Performed by: ORTHOPAEDIC SURGERY

## 2023-07-10 PROCEDURE — 20610 DRAIN/INJ JOINT/BURSA W/O US: CPT | Mod: PBBFAC | Performed by: ORTHOPAEDIC SURGERY

## 2023-07-10 PROCEDURE — 3288F FALL RISK ASSESSMENT DOCD: CPT | Mod: CPTII,S$GLB,, | Performed by: DERMATOLOGY

## 2023-07-10 PROCEDURE — 99999 PR PBB SHADOW E&M-EST. PATIENT-LVL III: CPT | Mod: PBBFAC,,, | Performed by: ORTHOPAEDIC SURGERY

## 2023-07-10 PROCEDURE — 3288F PR FALLS RISK ASSESSMENT DOCUMENTED: ICD-10-PCS | Mod: CPTII,S$GLB,, | Performed by: STUDENT IN AN ORGANIZED HEALTH CARE EDUCATION/TRAINING PROGRAM

## 2023-07-10 PROCEDURE — 1126F PR PAIN SEVERITY QUANTIFIED, NO PAIN PRESENT: ICD-10-PCS | Mod: CPTII,S$GLB,, | Performed by: DERMATOLOGY

## 2023-07-10 PROCEDURE — 3288F FALL RISK ASSESSMENT DOCD: CPT | Mod: CPTII,S$GLB,, | Performed by: STUDENT IN AN ORGANIZED HEALTH CARE EDUCATION/TRAINING PROGRAM

## 2023-07-10 PROCEDURE — 1101F PR PT FALLS ASSESS DOC 0-1 FALLS W/OUT INJ PAST YR: ICD-10-PCS | Mod: CPTII,S$GLB,, | Performed by: DERMATOLOGY

## 2023-07-10 PROCEDURE — 99213 OFFICE O/P EST LOW 20 MIN: CPT | Mod: PBBFAC,25 | Performed by: ORTHOPAEDIC SURGERY

## 2023-07-10 PROCEDURE — 99204 PR OFFICE/OUTPT VISIT, NEW, LEVL IV, 45-59 MIN: ICD-10-PCS | Mod: S$GLB,,, | Performed by: DERMATOLOGY

## 2023-07-10 PROCEDURE — 99204 OFFICE O/P NEW MOD 45 MIN: CPT | Mod: S$GLB,,, | Performed by: DERMATOLOGY

## 2023-07-10 PROCEDURE — 1101F PT FALLS ASSESS-DOCD LE1/YR: CPT | Mod: CPTII,S$GLB,, | Performed by: STUDENT IN AN ORGANIZED HEALTH CARE EDUCATION/TRAINING PROGRAM

## 2023-07-10 PROCEDURE — 20610 DRAIN/INJ JOINT/BURSA W/O US: CPT | Mod: PBBFAC,LT | Performed by: ORTHOPAEDIC SURGERY

## 2023-07-10 PROCEDURE — 3078F PR MOST RECENT DIASTOLIC BLOOD PRESSURE < 80 MM HG: ICD-10-PCS | Mod: CPTII,S$GLB,, | Performed by: STUDENT IN AN ORGANIZED HEALTH CARE EDUCATION/TRAINING PROGRAM

## 2023-07-10 PROCEDURE — 85025 COMPLETE CBC W/AUTO DIFF WBC: CPT | Performed by: STUDENT IN AN ORGANIZED HEALTH CARE EDUCATION/TRAINING PROGRAM

## 2023-07-10 PROCEDURE — 99999 PR PBB SHADOW E&M-EST. PATIENT-LVL III: ICD-10-PCS | Mod: PBBFAC,,, | Performed by: DERMATOLOGY

## 2023-07-10 PROCEDURE — 99213 OFFICE O/P EST LOW 20 MIN: CPT | Mod: 25,S$GLB,, | Performed by: ORTHOPAEDIC SURGERY

## 2023-07-10 PROCEDURE — 84443 ASSAY THYROID STIM HORMONE: CPT | Performed by: STUDENT IN AN ORGANIZED HEALTH CARE EDUCATION/TRAINING PROGRAM

## 2023-07-10 PROCEDURE — 99214 PR OFFICE/OUTPT VISIT, EST, LEVL IV, 30-39 MIN: ICD-10-PCS | Mod: S$GLB,,, | Performed by: STUDENT IN AN ORGANIZED HEALTH CARE EDUCATION/TRAINING PROGRAM

## 2023-07-10 PROCEDURE — 80053 COMPREHEN METABOLIC PANEL: CPT | Performed by: STUDENT IN AN ORGANIZED HEALTH CARE EDUCATION/TRAINING PROGRAM

## 2023-07-10 PROCEDURE — 3074F PR MOST RECENT SYSTOLIC BLOOD PRESSURE < 130 MM HG: ICD-10-PCS | Mod: CPTII,S$GLB,, | Performed by: STUDENT IN AN ORGANIZED HEALTH CARE EDUCATION/TRAINING PROGRAM

## 2023-07-10 PROCEDURE — 3008F PR BODY MASS INDEX (BMI) DOCUMENTED: ICD-10-PCS | Mod: CPTII,S$GLB,, | Performed by: STUDENT IN AN ORGANIZED HEALTH CARE EDUCATION/TRAINING PROGRAM

## 2023-07-10 PROCEDURE — 1126F AMNT PAIN NOTED NONE PRSNT: CPT | Mod: CPTII,S$GLB,, | Performed by: DERMATOLOGY

## 2023-07-10 PROCEDURE — 1126F AMNT PAIN NOTED NONE PRSNT: CPT | Mod: CPTII,S$GLB,, | Performed by: STUDENT IN AN ORGANIZED HEALTH CARE EDUCATION/TRAINING PROGRAM

## 2023-07-10 PROCEDURE — 3074F SYST BP LT 130 MM HG: CPT | Mod: CPTII,S$GLB,, | Performed by: STUDENT IN AN ORGANIZED HEALTH CARE EDUCATION/TRAINING PROGRAM

## 2023-07-10 PROCEDURE — 1160F PR REVIEW ALL MEDS BY PRESCRIBER/CLIN PHARMACIST DOCUMENTED: ICD-10-PCS | Mod: CPTII,S$GLB,, | Performed by: DERMATOLOGY

## 2023-07-10 PROCEDURE — 99213 PR OFFICE/OUTPT VISIT, EST, LEVL III, 20-29 MIN: ICD-10-PCS | Mod: 25,S$GLB,, | Performed by: ORTHOPAEDIC SURGERY

## 2023-07-10 PROCEDURE — 1126F PR PAIN SEVERITY QUANTIFIED, NO PAIN PRESENT: ICD-10-PCS | Mod: CPTII,S$GLB,, | Performed by: STUDENT IN AN ORGANIZED HEALTH CARE EDUCATION/TRAINING PROGRAM

## 2023-07-10 PROCEDURE — 1159F PR MEDICATION LIST DOCUMENTED IN MEDICAL RECORD: ICD-10-PCS | Mod: CPTII,S$GLB,, | Performed by: DERMATOLOGY

## 2023-07-10 PROCEDURE — 1101F PT FALLS ASSESS-DOCD LE1/YR: CPT | Mod: CPTII,S$GLB,, | Performed by: DERMATOLOGY

## 2023-07-10 PROCEDURE — 1101F PR PT FALLS ASSESS DOC 0-1 FALLS W/OUT INJ PAST YR: ICD-10-PCS | Mod: CPTII,S$GLB,, | Performed by: STUDENT IN AN ORGANIZED HEALTH CARE EDUCATION/TRAINING PROGRAM

## 2023-07-10 PROCEDURE — 3288F PR FALLS RISK ASSESSMENT DOCUMENTED: ICD-10-PCS | Mod: CPTII,S$GLB,, | Performed by: DERMATOLOGY

## 2023-07-10 PROCEDURE — 99999 PR PBB SHADOW E&M-EST. PATIENT-LVL V: CPT | Mod: PBBFAC,,, | Performed by: STUDENT IN AN ORGANIZED HEALTH CARE EDUCATION/TRAINING PROGRAM

## 2023-07-10 PROCEDURE — 3078F DIAST BP <80 MM HG: CPT | Mod: CPTII,S$GLB,, | Performed by: STUDENT IN AN ORGANIZED HEALTH CARE EDUCATION/TRAINING PROGRAM

## 2023-07-10 RX ORDER — MAGNESIUM 250 MG
TABLET ORAL
COMMUNITY
Start: 2023-05-29 | End: 2023-07-25

## 2023-07-10 RX ORDER — GLUCOSAMINE HCL 500 MG
TABLET ORAL
COMMUNITY
Start: 2023-05-29 | End: 2023-11-15

## 2023-07-10 RX ORDER — ACARBOSE 25 MG/1
TABLET ORAL
COMMUNITY
Start: 2023-06-24

## 2023-07-10 RX ORDER — CYSTEINE HCL 500 MG
6 CAPSULE ORAL DAILY
COMMUNITY
Start: 2023-05-29 | End: 2023-11-15

## 2023-07-10 RX ORDER — TRIAMCINOLONE ACETONIDE 1 MG/G
OINTMENT TOPICAL 2 TIMES DAILY
Qty: 15 G | Refills: 0 | Status: SHIPPED | OUTPATIENT
Start: 2023-07-10 | End: 2023-11-15

## 2023-07-10 RX ORDER — ERYTHROMYCIN 20 MG/G
GEL TOPICAL 2 TIMES DAILY
Qty: 30 G | Refills: 3 | Status: SHIPPED | OUTPATIENT
Start: 2023-07-10 | End: 2023-11-15

## 2023-07-10 RX ORDER — TRIAMCINOLONE ACETONIDE 1 MG/G
OINTMENT TOPICAL 2 TIMES DAILY
Qty: 15 G | Refills: 0 | Status: SHIPPED | OUTPATIENT
Start: 2023-07-10 | End: 2023-07-10

## 2023-07-10 RX ADMIN — Medication 20 MG: at 02:07

## 2023-07-10 NOTE — PATIENT INSTRUCTIONS
No hot water bathing reviewed.    Patient instructed in importance in daily sun protection. Sun avoidance and topical protection discussed.     Patient encouraged to wear hat for all outdoor exposure.     Also discussed sun protective clothing.    Patient to start using sulfur bar soap for the face or affected area 1-2 x day.  For scalp usage lather from the soap to be applied to the roots of the scalp and allow to sit for 3-5 minutes regularly.  Same instructions for other affected areas.

## 2023-07-10 NOTE — PROGRESS NOTES
Subjective:      Patient ID:  Thiago Thacker is a 73 y.o. male who presents for   Chief Complaint   Patient presents with    Rash     Face, neck     Rash - Initial  Affected locations: face and neck  Duration: 3 months  Signs / symptoms: irritated and redness  Severity: mild  Timing: constant  Aggravated by: shaving  Relieving factors/Treatments tried: nothing    Review of Systems   Constitutional:  Negative for fever and chills.   HENT:  Negative for sore throat.    Respiratory:  Negative for cough.    Skin:  Positive for rash.     Objective:   Physical Exam   Constitutional: He appears well-developed and well-nourished.   Neurological: He is alert and oriented to person, place, and time.   Psychiatric: He has a normal mood and affect.      Diagram Legend     Erythematous scaling macule/papule c/w actinic keratosis       Vascular papule c/w angioma      Pigmented verrucoid papule/plaque c/w seborrheic keratosis      Yellow umbilicated papule c/w sebaceous hyperplasia      Irregularly shaped tan macule c/w lentigo     1-2 mm smooth white papules consistent with Milia      Movable subcutaneous cyst with punctum c/w epidermal inclusion cyst      Subcutaneous movable cyst c/w pilar cyst      Firm pink to brown papule c/w dermatofibroma      Pedunculated fleshy papule(s) c/w skin tag(s)      Evenly pigmented macule c/w junctional nevus     Mildly variegated pigmented, slightly irregular-bordered macule c/w mildly atypical nevus      Flesh colored to evenly pigmented papule c/w intradermal nevus       Pink pearly papule/plaque c/w basal cell carcinoma      Erythematous hyperkeratotic cursted plaque c/w SCC      Surgical scar with no sign of skin cancer recurrence      Open and closed comedones      Inflammatory papules and pustules      Verrucoid papule consistent consistent with wart     Erythematous eczematous patches and plaques     Dystrophic onycholytic nail with subungual debris c/w onychomycosis      Umbilicated papule    Erythematous-base heme-crusted tan verrucoid plaque consistent with inflamed seborrheic keratosis     Erythematous Silvery Scaling Plaque c/w Psoriasis     See annotation      Assessment / Plan:        Vitiligo  25 hx.  Pt reports some improvement with b12.  Can cont taking b12 but debatable if full color will come back, jamar with ho hashimotos.  Discussed with patient the etiology and pathogenesis of the disease or skin lesion(s) and possible treatments and aggravators.      Rash of neck  -     Ambulatory referral/consult to Dermatology  Previous [facility] labs and or records and notes reviewed and considered for their impact on our clinical decision making today.  None noted today.    Encounter for skin care  Patient instructed in importance in daily sun protection. Sun avoidance and topical protection discussed.     Patient encouraged to wear hat for all outdoor exposure.     Also discussed sun protective clothing.  No hot water bathing reviewed.    Folliculitis  -     erythromycin with ethanoL (EMGEL) 2 % gel; Apply topically 2 (two) times daily. Prn breakouts of posterior neck  Dispense: 30 g; Refill: 3  Discussed with patient the etiology and pathogenesis of the disease or skin lesion(s) and possible treatments and aggravators.    Reviewed with patient different treatment options and associated risks.  Proper application of medications and or care for affected area(s) and condition(s) reviewed.  Patient to start using sulfur bar soap for the face or affected area 1-2 x day.  For scalp usage lather from the soap to be applied to the roots of the scalp and allow to sit for 3-5 minutes regularly.  Same instructions for other affected areas.    Erythema  Discussed with patient the etiology and pathogenesis of the disease or skin lesion(s) and possible treatments and aggravators.    Chronic nature of this condition discussed with patient.  Watch for stork bite changes.           Follow up in  about 1 year (around 7/10/2024).

## 2023-07-11 ENCOUNTER — PES CALL (OUTPATIENT)
Dept: ADMINISTRATIVE | Facility: CLINIC | Age: 73
End: 2023-07-11
Payer: COMMERCIAL

## 2023-07-11 ENCOUNTER — PATIENT MESSAGE (OUTPATIENT)
Dept: PRIMARY CARE CLINIC | Facility: CLINIC | Age: 73
End: 2023-07-11
Payer: COMMERCIAL

## 2023-07-11 LAB
ALBUMIN SERPL BCP-MCNC: 3.9 G/DL (ref 3.5–5.2)
ALP SERPL-CCNC: 81 U/L (ref 55–135)
ALT SERPL W/O P-5'-P-CCNC: 27 U/L (ref 10–44)
ANION GAP SERPL CALC-SCNC: 12 MMOL/L (ref 8–16)
AST SERPL-CCNC: 34 U/L (ref 10–40)
BILIRUB SERPL-MCNC: 0.7 MG/DL (ref 0.1–1)
BUN SERPL-MCNC: 19 MG/DL (ref 8–23)
CALCIUM SERPL-MCNC: 9.1 MG/DL (ref 8.7–10.5)
CHLORIDE SERPL-SCNC: 104 MMOL/L (ref 95–110)
CHOLEST SERPL-MCNC: 135 MG/DL (ref 120–199)
CHOLEST/HDLC SERPL: 2.9 {RATIO} (ref 2–5)
CO2 SERPL-SCNC: 25 MMOL/L (ref 23–29)
CREAT SERPL-MCNC: 1.1 MG/DL (ref 0.5–1.4)
EST. GFR  (NO RACE VARIABLE): >60 ML/MIN/1.73 M^2
GLUCOSE SERPL-MCNC: 90 MG/DL (ref 70–110)
HDLC SERPL-MCNC: 46 MG/DL (ref 40–75)
HDLC SERPL: 34.1 % (ref 20–50)
LDLC SERPL CALC-MCNC: 76.4 MG/DL (ref 63–159)
NONHDLC SERPL-MCNC: 89 MG/DL
POTASSIUM SERPL-SCNC: 4.2 MMOL/L (ref 3.5–5.1)
PROT SERPL-MCNC: 6.8 G/DL (ref 6–8.4)
SODIUM SERPL-SCNC: 141 MMOL/L (ref 136–145)
TRIGL SERPL-MCNC: 63 MG/DL (ref 30–150)
TSH SERPL DL<=0.005 MIU/L-ACNC: 2.81 UIU/ML (ref 0.4–4)

## 2023-07-12 ENCOUNTER — PES CALL (OUTPATIENT)
Dept: ADMINISTRATIVE | Facility: CLINIC | Age: 73
End: 2023-07-12
Payer: COMMERCIAL

## 2023-07-12 DIAGNOSIS — N40.1 BENIGN PROSTATIC HYPERPLASIA WITH NOCTURIA: ICD-10-CM

## 2023-07-12 DIAGNOSIS — E03.9 HYPOTHYROIDISM, UNSPECIFIED TYPE: Primary | ICD-10-CM

## 2023-07-12 DIAGNOSIS — I25.2 HX OF MYOCARDIAL INFARCTION: ICD-10-CM

## 2023-07-12 DIAGNOSIS — N52.9 ERECTILE DYSFUNCTION, UNSPECIFIED ERECTILE DYSFUNCTION TYPE: ICD-10-CM

## 2023-07-12 DIAGNOSIS — R35.1 BENIGN PROSTATIC HYPERPLASIA WITH NOCTURIA: ICD-10-CM

## 2023-07-12 DIAGNOSIS — D69.6 THROMBOCYTOPENIA: Primary | ICD-10-CM

## 2023-07-12 RX ORDER — TADALAFIL 5 MG/1
5 TABLET ORAL DAILY PRN
Qty: 90 TABLET | Refills: 0 | Status: SHIPPED | OUTPATIENT
Start: 2023-07-12 | End: 2023-12-31 | Stop reason: SDUPTHER

## 2023-07-12 RX ORDER — FINASTERIDE 5 MG/1
5 TABLET, FILM COATED ORAL DAILY
Qty: 90 TABLET | Refills: 1 | Status: SHIPPED | OUTPATIENT
Start: 2023-07-12

## 2023-07-12 RX ORDER — LEVOTHYROXINE SODIUM 175 UG/1
175 TABLET ORAL
Qty: 90 TABLET | Refills: 1 | Status: SHIPPED | OUTPATIENT
Start: 2023-07-12 | End: 2023-10-01

## 2023-07-12 RX ORDER — ATORVASTATIN CALCIUM 40 MG/1
40 TABLET, FILM COATED ORAL DAILY
Qty: 90 TABLET | Refills: 1 | Status: SHIPPED | OUTPATIENT
Start: 2023-07-12

## 2023-07-13 ENCOUNTER — TELEPHONE (OUTPATIENT)
Dept: OPTOMETRY | Facility: CLINIC | Age: 73
End: 2023-07-13
Payer: COMMERCIAL

## 2023-07-13 DIAGNOSIS — H25.12 NUCLEAR SCLEROTIC CATARACT OF LEFT EYE: Primary | ICD-10-CM

## 2023-07-13 RX ORDER — PREDNISOLONE ACETATE-GATIFLOXACIN-BROMFENAC .75; 5; 1 MG/ML; MG/ML; MG/ML
1 SUSPENSION/ DROPS OPHTHALMIC 3 TIMES DAILY
Qty: 5 ML | Refills: 3 | Status: SHIPPED | OUTPATIENT
Start: 2023-07-13 | End: 2023-11-15

## 2023-07-14 ENCOUNTER — HOSPITAL ENCOUNTER (OUTPATIENT)
Dept: RADIOLOGY | Facility: HOSPITAL | Age: 73
Discharge: HOME | End: 2023-07-14
Attending: STUDENT IN AN ORGANIZED HEALTH CARE EDUCATION/TRAINING PROGRAM
Payer: COMMERCIAL

## 2023-07-14 DIAGNOSIS — K46.9 HERNIA OF ABDOMINAL CAVITY: ICD-10-CM

## 2023-07-14 PROCEDURE — 76705 US ABDOMEN LIMITED_HERNIA: ICD-10-PCS | Mod: 26,,, | Performed by: INTERNAL MEDICINE

## 2023-07-14 PROCEDURE — 76705 ECHO EXAM OF ABDOMEN: CPT | Mod: 26,,, | Performed by: INTERNAL MEDICINE

## 2023-07-14 PROCEDURE — 76705 ECHO EXAM OF ABDOMEN: CPT | Mod: TC

## 2023-07-17 ENCOUNTER — OFFICE VISIT (OUTPATIENT)
Dept: SPORTS MEDICINE | Facility: CLINIC | Age: 73
End: 2023-07-17
Payer: COMMERCIAL

## 2023-07-17 VITALS
SYSTOLIC BLOOD PRESSURE: 133 MMHG | HEIGHT: 71 IN | WEIGHT: 188 LBS | DIASTOLIC BLOOD PRESSURE: 75 MMHG | BODY MASS INDEX: 26.32 KG/M2 | HEART RATE: 61 BPM

## 2023-07-17 DIAGNOSIS — M17.11 PRIMARY OSTEOARTHRITIS OF RIGHT KNEE: Primary | ICD-10-CM

## 2023-07-17 DIAGNOSIS — M17.12 PRIMARY OSTEOARTHRITIS OF LEFT KNEE: ICD-10-CM

## 2023-07-17 PROCEDURE — 20610 LARGE JOINT ASPIRATION/INJECTION: R KNEE: ICD-10-PCS | Mod: 50,S$GLB,, | Performed by: ORTHOPAEDIC SURGERY

## 2023-07-17 PROCEDURE — 20610 DRAIN/INJ JOINT/BURSA W/O US: CPT | Mod: PBBFAC,50 | Performed by: ORTHOPAEDIC SURGERY

## 2023-07-17 PROCEDURE — 99499 NO LOS: ICD-10-PCS | Mod: S$GLB,,, | Performed by: ORTHOPAEDIC SURGERY

## 2023-07-17 PROCEDURE — 20610 DRAIN/INJ JOINT/BURSA W/O US: CPT | Mod: PBBFAC | Performed by: ORTHOPAEDIC SURGERY

## 2023-07-17 PROCEDURE — 99999 PR PBB SHADOW E&M-EST. PATIENT-LVL III: CPT | Mod: PBBFAC,,, | Performed by: ORTHOPAEDIC SURGERY

## 2023-07-17 PROCEDURE — 99213 OFFICE O/P EST LOW 20 MIN: CPT | Mod: PBBFAC | Performed by: ORTHOPAEDIC SURGERY

## 2023-07-17 PROCEDURE — 99499 UNLISTED E&M SERVICE: CPT | Mod: S$GLB,,, | Performed by: ORTHOPAEDIC SURGERY

## 2023-07-17 PROCEDURE — 99999 PR PBB SHADOW E&M-EST. PATIENT-LVL III: ICD-10-PCS | Mod: PBBFAC,,, | Performed by: ORTHOPAEDIC SURGERY

## 2023-07-17 RX ADMIN — Medication 20 MG: at 02:07

## 2023-07-17 NOTE — PROGRESS NOTES
Patient is here for follow up of his knee left chondromalacia and arthritis. He is here today for a planned Euflexxa Injection (2/3) (LOT #[id number], exp 5/26/24). He understands potential risks and benefits of the procedure which includes pseudoseptic reaction and pain. He has failed conservative management to this point for his knee pain including NSAIDS.    Patient is here for follow up of his knee right chondromalacia and arthritis. He is here today for a planned Euflexxa Injection (1/3) (LOT #[id number], exp 5/26/24). He understands potential risks and benefits of the procedure which includes pseudoseptic reaction and pain. He has failed conservative management to this point for his knee pain including NSAIDS.    Exam findings remain unchanged from most recent visit. No erythema, warmth, or signs of infection.     The patient tolerated the procedure well. We discussed post-injection care of the knee. Plan for continued conservative management as discussed before. All questions were answered.     Large Joint Aspiration/Injection: R knee    Date/Time: 7/17/2023 2:00 PM  Performed by: JEREMI King MD  Authorized by: JEREMI King MD     Consent Done?:  Yes (Verbal)  Indications:  Pain  Site marked: the procedure site was marked    Timeout: prior to procedure the correct patient, procedure, and site was verified    Prep: patient was prepped and draped in usual sterile fashion      Details:  Needle Size:  22 G  Ultrasonic Guidance for needle placement?: No    Approach:  Lateral  Location:  Knee  Site:  R knee  Medications:  20 mg sodium hyaluronate (EUFLEXXA) 10 mg/mL(mw 2.4 -3.6 million)  Patient tolerance:  Patient tolerated the procedure well with no immediate complications  Large Joint Aspiration/Injection: L knee    Date/Time: 7/17/2023 2:00 PM  Performed by: JEREMI King MD  Authorized by: JEREMI King MD     Consent Done?:  Yes (Verbal)  Indications:  Pain  Site marked: the procedure site was  marked    Timeout: prior to procedure the correct patient, procedure, and site was verified    Prep: patient was prepped and draped in usual sterile fashion      Local anesthesia used?: Yes    Local anesthetic:  Co-phenylcaine spray    Details:  Needle Size:  22 G  Ultrasonic Guidance for needle placement?: No    Approach:  Lateral  Location:  Knee  Site:  L knee  Medications:  20 mg sodium hyaluronate (EUFLEXXA) 10 mg/mL(mw 2.4 -3.6 million)  Patient tolerance:  Patient tolerated the procedure well with no immediate complications

## 2023-07-24 ENCOUNTER — OFFICE VISIT (OUTPATIENT)
Dept: SPORTS MEDICINE | Facility: CLINIC | Age: 73
End: 2023-07-24
Payer: COMMERCIAL

## 2023-07-24 VITALS
BODY MASS INDEX: 26.2 KG/M2 | WEIGHT: 187.81 LBS | HEART RATE: 68 BPM | DIASTOLIC BLOOD PRESSURE: 70 MMHG | SYSTOLIC BLOOD PRESSURE: 116 MMHG

## 2023-07-24 DIAGNOSIS — M17.12 PRIMARY OSTEOARTHRITIS OF LEFT KNEE: ICD-10-CM

## 2023-07-24 DIAGNOSIS — M17.11 PRIMARY OSTEOARTHRITIS OF RIGHT KNEE: Primary | ICD-10-CM

## 2023-07-24 PROCEDURE — 99499 UNLISTED E&M SERVICE: CPT | Mod: S$PBB,,, | Performed by: ORTHOPAEDIC SURGERY

## 2023-07-24 PROCEDURE — 99999 PR PBB SHADOW E&M-EST. PATIENT-LVL III: ICD-10-PCS | Mod: PBBFAC,,, | Performed by: ORTHOPAEDIC SURGERY

## 2023-07-24 PROCEDURE — 20610 LARGE JOINT ASPIRATION/INJECTION: R KNEE: ICD-10-PCS | Mod: S$PBB,50,, | Performed by: ORTHOPAEDIC SURGERY

## 2023-07-24 PROCEDURE — 99999 PR PBB SHADOW E&M-EST. PATIENT-LVL III: CPT | Mod: PBBFAC,,, | Performed by: ORTHOPAEDIC SURGERY

## 2023-07-24 PROCEDURE — 20610 DRAIN/INJ JOINT/BURSA W/O US: CPT | Mod: S$PBB,50,, | Performed by: ORTHOPAEDIC SURGERY

## 2023-07-24 PROCEDURE — 99213 OFFICE O/P EST LOW 20 MIN: CPT | Mod: PBBFAC | Performed by: ORTHOPAEDIC SURGERY

## 2023-07-24 PROCEDURE — 99499 NO LOS: ICD-10-PCS | Mod: S$PBB,,, | Performed by: ORTHOPAEDIC SURGERY

## 2023-07-24 NOTE — PROGRESS NOTES
Patient is here for follow up of his knee right and left chondromalacia and arthritis. He is here today for a planned Left Euflexxa Injection (3/3) (LOT #[id number]) and Right Euflexxa Injection (2/3) (LOT #[id number]). He understands potential risks and benefits of the procedure which includes pseudoseptic reaction and pain. He has failed conservative management to this point for his knee pain including NSAIDS.    Exam findings remain unchanged from most recent visit. No erythema, warmth, or signs of infection.     The patient tolerated the procedure well. We discussed post-injection care of the knee. Plan for continued conservative management as discussed before. All questions were answered.     Large Joint Aspiration/Injection: R knee    Date/Time: 7/24/2023 2:00 PM  Performed by: JEREMI King MD  Authorized by: JEREMI King MD     Consent Done?:  Yes (Verbal)  Indications:  Pain  Site marked: the procedure site was marked    Timeout: prior to procedure the correct patient, procedure, and site was verified    Prep: patient was prepped and draped in usual sterile fashion      Details:  Needle Size:  22 G  Ultrasonic Guidance for needle placement?: No    Approach:  Lateral  Location:  Knee  Site:  R knee  Medications:  20 mg sodium hyaluronate (EUFLEXXA) 10 mg/mL(mw 2.4 -3.6 million)  Patient tolerance:  Patient tolerated the procedure well with no immediate complications  Large Joint Aspiration/Injection: L knee    Date/Time: 7/24/2023 2:00 PM  Performed by: JEREMI King MD  Authorized by: JEREMI King MD     Consent Done?:  Yes (Verbal)  Indications:  Pain  Site marked: the procedure site was marked    Timeout: prior to procedure the correct patient, procedure, and site was verified    Prep: patient was prepped and draped in usual sterile fashion      Local anesthesia used?: Yes    Local anesthetic:  Co-phenylcaine spray    Details:  Needle Size:  22 G  Ultrasonic Guidance for needle placement?: No     Approach:  Lateral  Location:  Knee  Site:  L knee  Medications:  20 mg sodium hyaluronate (EUFLEXXA) 10 mg/mL(mw 2.4 -3.6 million)  Patient tolerance:  Patient tolerated the procedure well with no immediate complications

## 2023-07-25 NOTE — PROGRESS NOTES
Patient is here for follow up of his knee right chondromalacia and arthritis. He is here today for a planned Euflexxa Injection (3/3) (LOT #[id number], Exp 5-). He understands potential risks and benefits of the procedure which includes pseudoseptic reaction and pain. He has failed conservative management to this point for his knee pain including NSAIDS.    Exam findings remain unchanged from most recent visit. No erythema, warmth, or signs of infection.     The patient tolerated the procedure well. We discussed post-injection care of the knee. Plan for continued conservative management as discussed before. All questions were answered.     We did discuss various options in regards to ortho biologic injections.  Perhaps interested and a candidate for PRP down the road.  We will see how he does with the bilateral knee Visco injections.  Return to clinic as needed.    Large Joint Aspiration/Injection: R knee    Date/Time: 7/31/2023 2:00 PM    Performed by: JEREMI King MD  Authorized by: JEREMI King MD    Consent Done?:  Yes (Verbal)  Indications:  Pain  Site marked: the procedure site was marked    Timeout: prior to procedure the correct patient, procedure, and site was verified    Prep: patient was prepped and draped in usual sterile fashion      Details:  Needle Size:  22 G  Ultrasonic Guidance for needle placement?: No    Approach:  Lateral  Location:  Knee  Site:  R knee  Medications:  20 mg sodium hyaluronate (EUFLEXXA) 10 mg/mL(mw 2.4 -3.6 million)  Patient tolerance:  Patient tolerated the procedure well with no immediate complications

## 2023-07-26 ENCOUNTER — OFFICE VISIT (OUTPATIENT)
Dept: SURGERY | Facility: CLINIC | Age: 73
End: 2023-07-26
Payer: COMMERCIAL

## 2023-07-26 VITALS
BODY MASS INDEX: 26.15 KG/M2 | HEART RATE: 68 BPM | HEIGHT: 71 IN | WEIGHT: 186.81 LBS | DIASTOLIC BLOOD PRESSURE: 80 MMHG | SYSTOLIC BLOOD PRESSURE: 135 MMHG

## 2023-07-26 DIAGNOSIS — K40.91 RECURRENT RIGHT INGUINAL HERNIA: Primary | ICD-10-CM

## 2023-07-26 PROCEDURE — 99999 PR PBB SHADOW E&M-EST. PATIENT-LVL IV: ICD-10-PCS | Mod: PBBFAC,,, | Performed by: SURGERY

## 2023-07-26 PROCEDURE — 99204 PR OFFICE/OUTPT VISIT, NEW, LEVL IV, 45-59 MIN: ICD-10-PCS | Mod: S$GLB,,, | Performed by: SURGERY

## 2023-07-26 PROCEDURE — 99999 PR PBB SHADOW E&M-EST. PATIENT-LVL IV: CPT | Mod: PBBFAC,,, | Performed by: SURGERY

## 2023-07-26 PROCEDURE — 99214 OFFICE O/P EST MOD 30 MIN: CPT | Mod: PBBFAC | Performed by: SURGERY

## 2023-07-26 PROCEDURE — 99204 OFFICE O/P NEW MOD 45 MIN: CPT | Mod: S$GLB,,, | Performed by: SURGERY

## 2023-07-26 RX ORDER — CEFAZOLIN SODIUM 2 G/50ML
2 SOLUTION INTRAVENOUS
Status: CANCELLED | OUTPATIENT
Start: 2023-07-26

## 2023-07-26 NOTE — PROGRESS NOTES
Surgery Clinic Note - H and P    Subjective:     Thiago Thacker is a 73 y.o. male with h/o CAD, MI, BPH osteoarthritis who presents to clinic for evaluation of right recurrent inguinal hernia. Has had right and left inguinal hernia repairs right inguinal repair in 2018 open done at [facility] and left inguinal repair in 2015 laparoscopically done at [facility] in [place]. Pt reports that he began to have pain and noticing a bulge for the past few months. Pt reports if he leans over, it will begin to be painful. He also states that he is able to push hernia back in without any problem, and states that increase in size over last couple of months. Denies any nausea or vomiting. Denies any heavy lifting worsening the hernia. Denies any urination issue with the hernia. Past surgical history includes right and left inguinal repair, urolift, laser of prostate, bladder surgery for bladder stones. Denies any cough increasing hernia size. Takes sirolimus for experimental reasons, from clinical trial at [facility].    Review of Systems   Constitutional:  Negative for chills and fever.   Respiratory:  Negative for cough.    Cardiovascular:  Negative for chest pain and palpitations.   Gastrointestinal:  Positive for abdominal pain. Negative for constipation, diarrhea, heartburn, nausea and vomiting.   Genitourinary:  Negative for dysuria, frequency and urgency.        PMH:   Past Medical History:   Diagnosis Date    Coronary atherosclerosis     Headache     Hypercholesterolemia     Myocardial infarction 2011       Past Surgical History:   Past Surgical History:   Procedure Laterality Date    BLEPHAROPLASTY      CATARACT EXTRACTION W/  INTRAOCULAR LENS IMPLANT Right 6/1/2023    Procedure: EXTRACTION, CATARACT, WITH IOL INSERTION;  Surgeon: Nessa Romo MD;  Location: [facility];  Service: Ophthalmology;  Laterality: Right;    COMPLETE LASER PHOTOVAPORIZATION OF PROSTATE      CORONARY STENT PLACEMENT  2011    INGUINAL  HERNIA REPAIR      vein         Social History:  Social History     Socioeconomic History    Marital status: Single   Tobacco Use    Smoking status: Never    Smokeless tobacco: Never   Substance and Sexual Activity    Alcohol use: Not Currently    Drug use: Never    Sexual activity: Not Currently     Partners: Female       Allergies:   Review of patient's allergies indicates:   Allergen Reactions    Celebrex [celecoxib] Rash       Medications:  Current Outpatient Medications:     acarbose (PRECOSE) 25 MG Tab, , Disp: , Rfl:     alpha lipoic acid 200 mg Tab, , Disp: , Rfl:     aspirin (ECOTRIN) 81 MG EC tablet, Take 81 mg by mouth once daily., Disp: , Rfl:     atorvastatin (LIPITOR) 40 MG tablet, Take 1 tablet (40 mg total) by mouth once daily., Disp: 90 tablet, Rfl: 1    erythromycin with ethanoL (EMGEL) 2 % gel, Apply topically 2 (two) times daily. Prn breakouts of posterior neck, Disp: 30 g, Rfl: 3    finasteride (PROSCAR) 5 mg tablet, Take 1 tablet (5 mg total) by mouth once daily., Disp: 90 tablet, Rfl: 1    GREEN TEA EXTRACT ORAL, Take 1 capsule by mouth once daily., Disp: , Rfl:     levothyroxine (SYNTHROID, LEVOTHROID) 175 MCG tablet, Take 1 tablet (175 mcg total) by mouth before breakfast., Disp: 90 tablet, Rfl: 1    multivitamin (THERAGRAN) per tablet, Take 1 tablet by mouth once daily., Disp: , Rfl:     OMEGA-3-DHA-EPA-FISH OIL ORAL, Take 4 g by mouth., Disp: , Rfl:     omega-3/dha/epa/fish oil (OMEGA-3 FISH OIL ORAL), Take 4 g by mouth once daily., Disp: , Rfl:     prednisolon/gatiflox/bromfenac (PREDNISOL ACE-GATIFLOX-BROMFEN) 1-0.5-0.075 % DrpS, Apply 1 drop to eye 3 (three) times daily., Disp: 5 mL, Rfl: 3    QUERCETIN DIHYDRATE, BULK, MISC, by Misc.(Non-Drug; Combo Route) route. Quercetin, Disp: , Rfl:     RESVERATROL ORAL, Take by mouth. Resveratrol (Longevinex formulation), Disp: , Rfl:     sirolimus (RAPAMUNE) 1 MG Tab, Take 10 mg by mouth once a week., Disp: , Rfl:     tadalafiL (CIALIS) 5 MG  tablet, Take 1 tablet (5 mg total) by mouth daily as needed for Erectile Dysfunction., Disp: 90 tablet, Rfl: 0    taurine 1,000 mg Cap, , Disp: , Rfl:     triamcinolone acetonide 0.1% (KENALOG) 0.1 % ointment, Apply topically 2 (two) times daily., Disp: 15 g, Rfl: 0    turmeric (CURCUMIN MISC), by Misc.(Non-Drug; Combo Route) route., Disp: , Rfl:   No current facility-administered medications for this visit.    Facility-Administered Medications Ordered in Other Visits:     balanced salt irrigation intra-ocular solution 1 drop, 1 drop, Right Eye, On Call Procedure, Nessa Romo MD    phenylephrine HCL 10% ophthalmic solution 1 drop, 1 drop, Right Eye, PRN, Nessa Romo MD    sodium chloride 0.9% flush 2 mL, 2 mL, Intravenous, PRN, Nessa Romo MD    Current Outpatient Medications on File Prior to Visit   Medication Sig Dispense Refill    acarbose (PRECOSE) 25 MG Tab       alpha lipoic acid 200 mg Tab       aspirin (ECOTRIN) 81 MG EC tablet Take 81 mg by mouth once daily.      atorvastatin (LIPITOR) 40 MG tablet Take 1 tablet (40 mg total) by mouth once daily. 90 tablet 1    erythromycin with ethanoL (EMGEL) 2 % gel Apply topically 2 (two) times daily. Prn breakouts of posterior neck 30 g 3    finasteride (PROSCAR) 5 mg tablet Take 1 tablet (5 mg total) by mouth once daily. 90 tablet 1    GREEN TEA EXTRACT ORAL Take 1 capsule by mouth once daily.      levothyroxine (SYNTHROID, LEVOTHROID) 175 MCG tablet Take 1 tablet (175 mcg total) by mouth before breakfast. 90 tablet 1    multivitamin (THERAGRAN) per tablet Take 1 tablet by mouth once daily.      OMEGA-3-DHA-EPA-FISH OIL ORAL Take 4 g by mouth.      omega-3/dha/epa/fish oil (OMEGA-3 FISH OIL ORAL) Take 4 g by mouth once daily.      prednisolon/gatiflox/bromfenac (PREDNISOL ACE-GATIFLOX-BROMFEN) 1-0.5-0.075 % DrpS Apply 1 drop to eye 3 (three) times daily. 5 mL 3    QUERCETIN DIHYDRATE, BULK, MISC by Misc.(Non-Drug; Combo Route) route. Quercetin       RESVERATROL ORAL Take by mouth. Resveratrol (Longevinex formulation)      sirolimus (RAPAMUNE) 1 MG Tab Take 10 mg by mouth once a week.      tadalafiL (CIALIS) 5 MG tablet Take 1 tablet (5 mg total) by mouth daily as needed for Erectile Dysfunction. 90 tablet 0    taurine 1,000 mg Cap       triamcinolone acetonide 0.1% (KENALOG) 0.1 % ointment Apply topically 2 (two) times daily. 15 g 0    turmeric (CURCUMIN MISC) by Misc.(Non-Drug; Combo Route) route.       Current Facility-Administered Medications on File Prior to Visit   Medication Dose Route Frequency Provider Last Rate Last Admin    balanced salt irrigation intra-ocular solution 1 drop  1 drop Right Eye On Call Procedure Nessa Romo MD        phenylephrine HCL 10% ophthalmic solution 1 drop  1 drop Right Eye PRN Nessa Romo MD        sodium chloride 0.9% flush 2 mL  2 mL Intravenous PRN Nessa Romo MD             Objective:     PHYSICAL EXAM:  Vital Signs (Most Recent)         Physical Exam  Constitutional:       Appearance: Normal appearance.   Cardiovascular:      Rate and Rhythm: Normal rate and regular rhythm.   Pulmonary:      Effort: Pulmonary effort is normal.   Abdominal:      General: Bowel sounds are normal.      Palpations: Abdomen is soft.      Hernia: A hernia is present.      Comments: Right inguinal hernia, palpable defect   Skin:     Capillary Refill: Capillary refill takes less than 2 seconds.             Pertinent imaging/labs:   US 7/2023-Images demonstrate evidence of a right inguinal hernia.      Assessment:     73 y.o. male with h/o CAD, MI, BPH osteoarthritis who presents to clinic for evaluation of recurrent right inguinal hernia.    Plan:     - plan for robotic recurrent right inguinal hernia repair  - cardiology clearance  - consent obtained  - needs to stop taking sirolimus before surgery, contact [facility] clinical trial, or whoever is prescribing sirolimus

## 2023-07-26 NOTE — H&P (VIEW-ONLY)
Surgery Clinic Note - H and P    Subjective:     Thiago Thacker is a 73 y.o. male with h/o CAD, MI, BPH osteoarthritis who presents to clinic for evaluation of right recurrent inguinal hernia. Has had right and left inguinal hernia repairs right inguinal repair in 2018 open done at [facility] and left inguinal repair in 2015 laparoscopically done at [facility] in [place]. Pt reports that he began to have pain and noticing a bulge for the past few months. Pt reports if he leans over, it will begin to be painful. He also states that he is able to push hernia back in without any problem, and states that increase in size over last couple of months. Denies any nausea or vomiting. Denies any heavy lifting worsening the hernia. Denies any urination issue with the hernia. Past surgical history includes right and left inguinal repair, urolift, laser of prostate, bladder surgery for bladder stones. Denies any cough increasing hernia size. Takes sirolimus for experimental reasons, from clinical trial at [facility].    Review of Systems   Constitutional:  Negative for chills and fever.   Respiratory:  Negative for cough.    Cardiovascular:  Negative for chest pain and palpitations.   Gastrointestinal:  Positive for abdominal pain. Negative for constipation, diarrhea, heartburn, nausea and vomiting.   Genitourinary:  Negative for dysuria, frequency and urgency.        PMH:   Past Medical History:   Diagnosis Date    Coronary atherosclerosis     Headache     Hypercholesterolemia     Myocardial infarction 2011       Past Surgical History:   Past Surgical History:   Procedure Laterality Date    BLEPHAROPLASTY      CATARACT EXTRACTION W/  INTRAOCULAR LENS IMPLANT Right 6/1/2023    Procedure: EXTRACTION, CATARACT, WITH IOL INSERTION;  Surgeon: Nessa Romo MD;  Location: [facility];  Service: Ophthalmology;  Laterality: Right;    COMPLETE LASER PHOTOVAPORIZATION OF PROSTATE      CORONARY STENT PLACEMENT  2011    INGUINAL  HERNIA REPAIR      vein         Social History:  Social History     Socioeconomic History    Marital status: Single   Tobacco Use    Smoking status: Never    Smokeless tobacco: Never   Substance and Sexual Activity    Alcohol use: Not Currently    Drug use: Never    Sexual activity: Not Currently     Partners: Female       Allergies:   Review of patient's allergies indicates:   Allergen Reactions    Celebrex [celecoxib] Rash       Medications:  Current Outpatient Medications:     acarbose (PRECOSE) 25 MG Tab, , Disp: , Rfl:     alpha lipoic acid 200 mg Tab, , Disp: , Rfl:     aspirin (ECOTRIN) 81 MG EC tablet, Take 81 mg by mouth once daily., Disp: , Rfl:     atorvastatin (LIPITOR) 40 MG tablet, Take 1 tablet (40 mg total) by mouth once daily., Disp: 90 tablet, Rfl: 1    erythromycin with ethanoL (EMGEL) 2 % gel, Apply topically 2 (two) times daily. Prn breakouts of posterior neck, Disp: 30 g, Rfl: 3    finasteride (PROSCAR) 5 mg tablet, Take 1 tablet (5 mg total) by mouth once daily., Disp: 90 tablet, Rfl: 1    GREEN TEA EXTRACT ORAL, Take 1 capsule by mouth once daily., Disp: , Rfl:     levothyroxine (SYNTHROID, LEVOTHROID) 175 MCG tablet, Take 1 tablet (175 mcg total) by mouth before breakfast., Disp: 90 tablet, Rfl: 1    multivitamin (THERAGRAN) per tablet, Take 1 tablet by mouth once daily., Disp: , Rfl:     OMEGA-3-DHA-EPA-FISH OIL ORAL, Take 4 g by mouth., Disp: , Rfl:     omega-3/dha/epa/fish oil (OMEGA-3 FISH OIL ORAL), Take 4 g by mouth once daily., Disp: , Rfl:     prednisolon/gatiflox/bromfenac (PREDNISOL ACE-GATIFLOX-BROMFEN) 1-0.5-0.075 % DrpS, Apply 1 drop to eye 3 (three) times daily., Disp: 5 mL, Rfl: 3    QUERCETIN DIHYDRATE, BULK, MISC, by Misc.(Non-Drug; Combo Route) route. Quercetin, Disp: , Rfl:     RESVERATROL ORAL, Take by mouth. Resveratrol (Longevinex formulation), Disp: , Rfl:     sirolimus (RAPAMUNE) 1 MG Tab, Take 10 mg by mouth once a week., Disp: , Rfl:     tadalafiL (CIALIS) 5 MG  tablet, Take 1 tablet (5 mg total) by mouth daily as needed for Erectile Dysfunction., Disp: 90 tablet, Rfl: 0    taurine 1,000 mg Cap, , Disp: , Rfl:     triamcinolone acetonide 0.1% (KENALOG) 0.1 % ointment, Apply topically 2 (two) times daily., Disp: 15 g, Rfl: 0    turmeric (CURCUMIN MISC), by Misc.(Non-Drug; Combo Route) route., Disp: , Rfl:   No current facility-administered medications for this visit.    Facility-Administered Medications Ordered in Other Visits:     balanced salt irrigation intra-ocular solution 1 drop, 1 drop, Right Eye, On Call Procedure, Nessa Romo MD    phenylephrine HCL 10% ophthalmic solution 1 drop, 1 drop, Right Eye, PRN, Nessa Romo MD    sodium chloride 0.9% flush 2 mL, 2 mL, Intravenous, PRN, Nessa Romo MD    Current Outpatient Medications on File Prior to Visit   Medication Sig Dispense Refill    acarbose (PRECOSE) 25 MG Tab       alpha lipoic acid 200 mg Tab       aspirin (ECOTRIN) 81 MG EC tablet Take 81 mg by mouth once daily.      atorvastatin (LIPITOR) 40 MG tablet Take 1 tablet (40 mg total) by mouth once daily. 90 tablet 1    erythromycin with ethanoL (EMGEL) 2 % gel Apply topically 2 (two) times daily. Prn breakouts of posterior neck 30 g 3    finasteride (PROSCAR) 5 mg tablet Take 1 tablet (5 mg total) by mouth once daily. 90 tablet 1    GREEN TEA EXTRACT ORAL Take 1 capsule by mouth once daily.      levothyroxine (SYNTHROID, LEVOTHROID) 175 MCG tablet Take 1 tablet (175 mcg total) by mouth before breakfast. 90 tablet 1    multivitamin (THERAGRAN) per tablet Take 1 tablet by mouth once daily.      OMEGA-3-DHA-EPA-FISH OIL ORAL Take 4 g by mouth.      omega-3/dha/epa/fish oil (OMEGA-3 FISH OIL ORAL) Take 4 g by mouth once daily.      prednisolon/gatiflox/bromfenac (PREDNISOL ACE-GATIFLOX-BROMFEN) 1-0.5-0.075 % DrpS Apply 1 drop to eye 3 (three) times daily. 5 mL 3    QUERCETIN DIHYDRATE, BULK, MISC by Misc.(Non-Drug; Combo Route) route. Quercetin       RESVERATROL ORAL Take by mouth. Resveratrol (Longevinex formulation)      sirolimus (RAPAMUNE) 1 MG Tab Take 10 mg by mouth once a week.      tadalafiL (CIALIS) 5 MG tablet Take 1 tablet (5 mg total) by mouth daily as needed for Erectile Dysfunction. 90 tablet 0    taurine 1,000 mg Cap       triamcinolone acetonide 0.1% (KENALOG) 0.1 % ointment Apply topically 2 (two) times daily. 15 g 0    turmeric (CURCUMIN MISC) by Misc.(Non-Drug; Combo Route) route.       Current Facility-Administered Medications on File Prior to Visit   Medication Dose Route Frequency Provider Last Rate Last Admin    balanced salt irrigation intra-ocular solution 1 drop  1 drop Right Eye On Call Procedure Nessa Romo MD        phenylephrine HCL 10% ophthalmic solution 1 drop  1 drop Right Eye PRN Nessa Romo MD        sodium chloride 0.9% flush 2 mL  2 mL Intravenous PRN Nessa Romo MD             Objective:     PHYSICAL EXAM:  Vital Signs (Most Recent)         Physical Exam  Constitutional:       Appearance: Normal appearance.   Cardiovascular:      Rate and Rhythm: Normal rate and regular rhythm.   Pulmonary:      Effort: Pulmonary effort is normal.   Abdominal:      General: Bowel sounds are normal.      Palpations: Abdomen is soft.      Hernia: A hernia is present.      Comments: Right inguinal hernia, palpable defect   Skin:     Capillary Refill: Capillary refill takes less than 2 seconds.             Pertinent imaging/labs:   US 7/2023-Images demonstrate evidence of a right inguinal hernia.      Assessment:     73 y.o. male with h/o CAD, MI, BPH osteoarthritis who presents to clinic for evaluation of recurrent right inguinal hernia.    Plan:     - plan for robotic recurrent right inguinal hernia repair  - cardiology clearance  - consent obtained  - needs to stop taking sirolimus before surgery, contact [facility] clinical trial, or whoever is prescribing sirolimus

## 2023-07-31 ENCOUNTER — OFFICE VISIT (OUTPATIENT)
Dept: SPORTS MEDICINE | Facility: CLINIC | Age: 73
End: 2023-07-31
Payer: COMMERCIAL

## 2023-07-31 VITALS
DIASTOLIC BLOOD PRESSURE: 77 MMHG | SYSTOLIC BLOOD PRESSURE: 123 MMHG | HEIGHT: 71 IN | WEIGHT: 186 LBS | HEART RATE: 60 BPM | BODY MASS INDEX: 26.04 KG/M2

## 2023-07-31 DIAGNOSIS — M17.11 PRIMARY OSTEOARTHRITIS OF RIGHT KNEE: Primary | ICD-10-CM

## 2023-07-31 PROCEDURE — 20610 LARGE JOINT ASPIRATION/INJECTION: R KNEE: ICD-10-PCS | Mod: RT,S$GLB,, | Performed by: ORTHOPAEDIC SURGERY

## 2023-07-31 PROCEDURE — 99214 OFFICE O/P EST MOD 30 MIN: CPT | Mod: PBBFAC,25 | Performed by: ORTHOPAEDIC SURGERY

## 2023-07-31 PROCEDURE — 99999 PR PBB SHADOW E&M-EST. PATIENT-LVL IV: ICD-10-PCS | Mod: PBBFAC,,, | Performed by: ORTHOPAEDIC SURGERY

## 2023-07-31 PROCEDURE — 99499 NO LOS: ICD-10-PCS | Mod: S$GLB,,, | Performed by: ORTHOPAEDIC SURGERY

## 2023-07-31 PROCEDURE — 99499 UNLISTED E&M SERVICE: CPT | Mod: S$GLB,,, | Performed by: ORTHOPAEDIC SURGERY

## 2023-07-31 PROCEDURE — 99999 PR PBB SHADOW E&M-EST. PATIENT-LVL IV: CPT | Mod: PBBFAC,,, | Performed by: ORTHOPAEDIC SURGERY

## 2023-07-31 PROCEDURE — 20610 DRAIN/INJ JOINT/BURSA W/O US: CPT | Mod: PBBFAC | Performed by: ORTHOPAEDIC SURGERY

## 2023-07-31 RX ADMIN — Medication 20 MG: at 02:07

## 2023-08-02 ENCOUNTER — CLINICAL SUPPORT (OUTPATIENT)
Dept: ENDOSCOPY | Facility: HOSPITAL | Age: 73
End: 2023-08-02
Payer: MEDICARE

## 2023-08-02 ENCOUNTER — TELEPHONE (OUTPATIENT)
Dept: ENDOSCOPY | Facility: HOSPITAL | Age: 73
End: 2023-08-02

## 2023-08-02 DIAGNOSIS — Z80.0 FAMILY HISTORY OF COLON CANCER: ICD-10-CM

## 2023-08-02 DIAGNOSIS — Z12.12 ENCOUNTER FOR COLORECTAL CANCER SCREENING: ICD-10-CM

## 2023-08-02 DIAGNOSIS — Z12.11 ENCOUNTER FOR COLORECTAL CANCER SCREENING: ICD-10-CM

## 2023-08-02 NOTE — PLAN OF CARE
Spoke to patient to schedule procedure(s) Colonoscopy       Physician to perform procedure(s) Dr. BALDEV Richardson  Date of Procedure (s) 10/9/23  Arrival Time 7:00 AM  Time of Procedure(s) 8:00 AM   Location of Procedure(s) [facility]  Type of Rx Prep sent to patient: PEG/ Pt. Has prep from cancelled procedure in march  Instructions provided to patient via [facility]    Patient was informed on the following information and verbalized understanding. Screening questionnaire reviewed with patient and complete. If procedure requires anesthesia, a responsible adult needs to be present to accompany the patient home, patient cannot drive after receiving anesthesia. Appointment details are tentative, especially check-in time. Patient will receive a prep-op call 4 days prior to confirm check-in time for procedure. If applicable the patient should contact their pharmacy to verify Rx for procedure prep is ready for pick-up. Patient was advised to call the scheduling department at [phone] if pharmacy states no Rx is available. Patient was advised to call the endoscopy scheduling department if any questions or concerns arise.      [facility] Scheduling Department

## 2023-08-02 NOTE — TELEPHONE ENCOUNTER
Spoke to patient to schedule procedure(s) Colonoscopy       Physician to perform procedure(s) Dr. BALDEV Richardson  Date of Procedure (s) 10/9/23  Arrival Time 7:00 AM  Time of Procedure(s) 8:00 AM   Location of Procedure(s) [facility] [address] [facility]  Type of Rx Prep sent to patient: PEG/ Pt. Has prep from cancelled procedure in march  Instructions provided to patient via [facility]     Patient was informed on the following information and verbalized understanding. Screening questionnaire reviewed with patient and complete. If procedure requires anesthesia, a responsible adult needs to be present to accompany the patient home, patient cannot drive after receiving anesthesia. Appointment details are tentative, especially check-in time. Patient will receive a prep-op call 4 days prior to confirm check-in time for procedure. If applicable the patient should contact their pharmacy to verify Rx for procedure prep is ready for pick-up. Patient was advised to call the scheduling department at [phone] if pharmacy states no Rx is available. Patient was advised to call the endoscopy scheduling department if any questions or concerns arise.        [facility] Scheduling Department

## 2023-08-04 ENCOUNTER — OFFICE VISIT (OUTPATIENT)
Dept: PRIMARY CARE CLINIC | Facility: CLINIC | Age: 73
End: 2023-08-04
Payer: COMMERCIAL

## 2023-08-04 VITALS
OXYGEN SATURATION: 98 % | WEIGHT: 190.06 LBS | SYSTOLIC BLOOD PRESSURE: 118 MMHG | BODY MASS INDEX: 26.61 KG/M2 | HEIGHT: 71 IN | HEART RATE: 63 BPM | DIASTOLIC BLOOD PRESSURE: 72 MMHG

## 2023-08-04 DIAGNOSIS — D69.6 THROMBOCYTOPENIA: ICD-10-CM

## 2023-08-04 DIAGNOSIS — E03.9 HYPOTHYROIDISM, UNSPECIFIED TYPE: ICD-10-CM

## 2023-08-04 DIAGNOSIS — I25.10 ATHEROSCLEROSIS OF NATIVE CORONARY ARTERY OF NATIVE HEART WITHOUT ANGINA PECTORIS: ICD-10-CM

## 2023-08-04 DIAGNOSIS — M17.11 OSTEOARTHRITIS OF RIGHT KNEE, UNSPECIFIED OSTEOARTHRITIS TYPE: ICD-10-CM

## 2023-08-04 DIAGNOSIS — N40.1 BENIGN PROSTATIC HYPERPLASIA WITH NOCTURIA: ICD-10-CM

## 2023-08-04 DIAGNOSIS — Z00.00 ENCOUNTER FOR PREVENTIVE HEALTH EXAMINATION: Primary | ICD-10-CM

## 2023-08-04 DIAGNOSIS — R35.1 BENIGN PROSTATIC HYPERPLASIA WITH NOCTURIA: ICD-10-CM

## 2023-08-04 DIAGNOSIS — E78.00 HYPERCHOLESTEROLEMIA: ICD-10-CM

## 2023-08-04 PROCEDURE — G0439 PR MEDICARE ANNUAL WELLNESS SUBSEQUENT VISIT: ICD-10-PCS | Mod: S$GLB,,,

## 2023-08-04 PROCEDURE — 99215 OFFICE O/P EST HI 40 MIN: CPT | Mod: PBBFAC,PN

## 2023-08-04 PROCEDURE — 99999 PR PBB SHADOW E&M-EST. PATIENT-LVL V: ICD-10-PCS | Mod: PBBFAC,,,

## 2023-08-04 PROCEDURE — 99999 PR PBB SHADOW E&M-EST. PATIENT-LVL V: CPT | Mod: PBBFAC,,,

## 2023-08-04 PROCEDURE — G0439 PPPS, SUBSEQ VISIT: HCPCS | Mod: S$GLB,,,

## 2023-08-04 NOTE — PATIENT INSTRUCTIONS
Counseling and Referral of Other Preventative  (Italic type indicates deductible and co-insurance are waived)    Patient Name: Thiago Thacker  Today's Date: 8/4/2023    Health Maintenance       Date Due Completion Date    TETANUS VACCINE Never done ---    Shingles Vaccine (1 of 2) Never done ---    COVID-19 Vaccine (6 - Pfizer series) 09/01/2023 5/1/2023    Influenza Vaccine (1) 09/01/2023 9/14/2022    Aspirin/Antiplatelet Therapy 07/26/2024 7/26/2023    Colorectal Cancer Screening 01/09/2028 1/9/2018    Lipid Panel 07/10/2028 7/10/2023        No orders of the defined types were placed in this encounter.      The following information is provided to all patients.  This information is to help you find resources for any of the problems found today that may be affecting your health:                Living healthy guide: www.[facility].[place].gov      Understanding Diabetes: www.diabetes.org      Eating healthy: www.cdc.gov/healthyweight      [facility] home safety checklist: www.cdc.gov/steadi/patient.html      Agency on Aging: www.goea.[place].[facility]      Alcoholics anonymous (AA): www.aa.org      Physical Activity: www.susy.nih.gov/ez1xhul      Tobacco use: www.quitwithusla.org

## 2023-08-04 NOTE — PROGRESS NOTES
"  Thiago Thacker presented for a  Medicare AWV and comprehensive Health Risk Assessment today.  He is a patient of Dr. Reyes and is new to me.  The following components were reviewed and updated:    Medical history  Family History  Social history  Allergies and Current Medications  Health Risk Assessment  Health Maintenance  Care Team     ** See Completed Assessments for Annual Wellness Visit within the encounter summary.**    The following assessments were completed:  Living Situation  CAGE  Depression Screening  Timed Get Up and Go  Whisper Test  Cognitive Function Screening  Nutrition Screening  ADL Screening  PAQ Screening  Review for opioid screen: pt does not have rx for opioid  Review for substance use disorder:  pt does not use substance          Vitals:    08/04/23 0956   BP: 118/72   BP Location: Right arm   Patient Position: Sitting   BP Method: Medium (Manual)   Pulse: 63   SpO2: 98%   Weight: 86.2 kg (190 lb 0.6 oz)   Height: 5' 11" (1.803 m)     Body mass index is 26.5 kg/m².  Physical Exam  Vitals and nursing note reviewed.   Constitutional:       Appearance: Normal appearance.   HENT:      Head: Normocephalic and atraumatic.   Cardiovascular:      Rate and Rhythm: Normal rate and regular rhythm.      Pulses: Normal pulses.           Radial pulses are 2+ on the right side and 2+ on the left side.        Dorsalis pedis pulses are 2+ on the right side and 2+ on the left side.        Posterior tibial pulses are 2+ on the right side and 2+ on the left side.      Heart sounds: Normal heart sounds.   Pulmonary:      Effort: Pulmonary effort is normal.      Breath sounds: Normal breath sounds.   Musculoskeletal:      Right lower leg: No edema.      Left lower leg: No edema.   Skin:     General: Skin is warm and dry.      Capillary Refill: Capillary refill takes less than 2 seconds.   Neurological:      General: No focal deficit present.      Mental Status: He is alert and oriented to person, place, and time. "   Psychiatric:         Mood and Affect: Mood normal.         Behavior: Behavior normal.        Diagnoses and health risks identified today and associated recommendations/orders:    1. Encounter for preventive health examination  Assessment and evaluation performed as stated above.    2. Thrombocytopenia  Stable, followed by Hematology.    3. Atherosclerosis of native coronary artery of native heart without angina pectoris  Stable on atorvastatin.  Followed by PCP.    4. Hypothyroidism, unspecified type  Stable on levothyroxine.  Followed by PCP.    5. Hypercholesterolemia  - stable on atorvastatin, regular exercise, and Mediterranean diet.  Followed by PCP    6. Benign prostatic hyperplasia with nocturia  Stable on finasteride.  Followed by PCP    7. Osteoarthritis of right knee, unspecified osteoarthritis type  Stable with p.r.n. use of acetaminophen, ibuprofen.  Followed by sports Medicine.      Provided Thiago with a 5-10 year written screening schedule and personal prevention plan. Recommendations were developed using the USPSTF age appropriate recommendations. Education, counseling, and referrals were provided as needed. After Visit Summary printed and given to patient which includes a list of additional screenings\tests needed.    Follow up in about 1 year (around 8/4/2024), or if symptoms worsen or fail to improve.      Camilla Hough, YESSICA    I offered to discuss advanced care planning, including how to pick a person who would make decisions for you if you were unable to make them for yourself, called a health care power of , and what kind of decisions you might make such as use of life sustaining treatments such as ventilators and tube feeding when faced with a life limiting illness recorded on a living will that they will need to know. (How you want to be cared for as you near the end of your natural life)     X Patient is interested in learning more about how to make advanced directives.  I provided  them paperwork and offered to discuss this with them.

## 2023-08-08 ENCOUNTER — TELEPHONE (OUTPATIENT)
Dept: OPHTHALMOLOGY | Facility: CLINIC | Age: 73
End: 2023-08-08
Payer: COMMERCIAL

## 2023-08-08 NOTE — TELEPHONE ENCOUNTER
RYANM with arrival time of 7:00 for sx on 8/9/23 with Dr. Romo @ [facility]. Pt has eyedrops and packet

## 2023-08-08 NOTE — PRE-PROCEDURE INSTRUCTIONS
Unable to reach pt via phone. Left message with instructions along with a request for a call back. The following was sent to pt portal.      - Nothing to eat or drink after midinight, except AM meds with small sips of water  - Hold all Diabetic meds AM of surgery  - Hold all Insulin AM of surgery  - Hold all Fluid pills AM of surgery  - Hold all non-insulin shots until after surgery (Ozempic, Mounjaro, Trulicity, Victoza, Byetta, Wegovy and Adlyxin) (up to 7 days prior)  - Take all B/P meds, except those that contain a fluid pill  - Hold all vits and herbal meds AM of surgery  - Use inhalers as needed and bring AM of surgery  - Take blood thinner meds AM of surgery  - Use eye drops as directed  - Shower and wash face with dial soap for 3 mins PM prior and AM of surgery  - No powder, lotions, creams, (makeup),  or jewelry    - Wear comfortable clothing (button up shirt)     (Patients ride may not leave while patient is in surgery)     -- 2nd floor surgery ctr at [facility] @ [address], [place] [address]

## 2023-08-09 ENCOUNTER — TELEPHONE (OUTPATIENT)
Dept: OPHTHALMOLOGY | Facility: CLINIC | Age: 73
End: 2023-08-09
Payer: COMMERCIAL

## 2023-08-09 ENCOUNTER — HOSPITAL ENCOUNTER (OUTPATIENT)
Facility: HOSPITAL | Age: 73
Discharge: HOME | End: 2023-08-09
Attending: OPHTHALMOLOGY | Admitting: OPHTHALMOLOGY
Payer: COMMERCIAL

## 2023-08-09 VITALS
RESPIRATION RATE: 18 BRPM | DIASTOLIC BLOOD PRESSURE: 83 MMHG | SYSTOLIC BLOOD PRESSURE: 137 MMHG | HEART RATE: 66 BPM | OXYGEN SATURATION: 95 % | TEMPERATURE: 99 F

## 2023-08-09 DIAGNOSIS — H25.12 AGE-RELATED NUCLEAR CATARACT, LEFT: ICD-10-CM

## 2023-08-09 DIAGNOSIS — H25.12 NUCLEAR SCLEROTIC CATARACT OF LEFT EYE: Primary | ICD-10-CM

## 2023-08-09 DIAGNOSIS — H25.11 NUCLEAR SCLEROTIC CATARACT OF RIGHT EYE: Primary | ICD-10-CM

## 2023-08-09 PROCEDURE — 94760 N-INVAS EAR/PLS OXIMETRY 1: CPT

## 2023-08-09 PROCEDURE — 94761 N-INVAS EAR/PLS OXIMETRY MLT: CPT

## 2023-08-09 PROCEDURE — 99900035 HC TECH TIME PER 15 MIN (STAT)

## 2023-08-09 PROCEDURE — 25000003 PHARM REV CODE 250: Performed by: OPHTHALMOLOGY

## 2023-08-09 RX ORDER — SODIUM CHLORIDE 0.9 % (FLUSH) 0.9 %
2 SYRINGE (ML) INJECTION
Status: DISCONTINUED | OUTPATIENT
Start: 2023-08-09 | End: 2023-08-09 | Stop reason: HOSPADM

## 2023-08-09 RX ORDER — TETRACAINE HYDROCHLORIDE 5 MG/ML
1 SOLUTION OPHTHALMIC
Status: DISCONTINUED | OUTPATIENT
Start: 2023-08-09 | End: 2023-08-09 | Stop reason: HOSPADM

## 2023-08-09 RX ORDER — PHENYLEPHRINE HYDROCHLORIDE 100 MG/ML
1 SOLUTION/ DROPS OPHTHALMIC
Status: DISCONTINUED | OUTPATIENT
Start: 2023-08-09 | End: 2023-08-09 | Stop reason: HOSPADM

## 2023-08-09 RX ORDER — PROPARACAINE HYDROCHLORIDE 5 MG/ML
1 SOLUTION/ DROPS OPHTHALMIC
Status: DISCONTINUED | OUTPATIENT
Start: 2023-08-09 | End: 2023-08-09 | Stop reason: HOSPADM

## 2023-08-09 RX ORDER — MOXIFLOXACIN 5 MG/ML
1 SOLUTION/ DROPS OPHTHALMIC
Status: DISCONTINUED | OUTPATIENT
Start: 2023-08-09 | End: 2023-08-09 | Stop reason: HOSPADM

## 2023-08-09 RX ORDER — CYCLOP/TROP/PROPA/PHEN/KET/WAT 1-1-0.1%
1 DROPS (EA) OPHTHALMIC (EYE)
Status: DISPENSED | OUTPATIENT
Start: 2023-08-09 | End: 2023-08-09

## 2023-08-09 NOTE — CARE UPDATE
Anesthesiology Note    Patient for elective cataract extraction by Dr. JANNA Romo.   Patient drove himself to [facility] with intention of driving himself home after procedure.     Had extensive discussion with Mr. Thacker, Dr. Romo and NESTOR Weinstein ([facility] Director) regarding the inability to allow patient to drive himself home after procedure if he was going to receive IV sedatives as it would be unsafe and it violates our policies. Additionally discussed how he would have had multiple eye drops used on his operative eye which would not allow him to operate heavy machinery in a safe manner.     Discussed possible options such as obtaining a designated  for the day in order to have procedure done today vs. Rescheduling procedure.     Patient will attempt to find a designated     Jim Bean MD  Department of Anesthesiology  08/09/2023  8:42 AM

## 2023-08-09 NOTE — H&P
History    Chief complaint:  Painless progressive vision loss    Present Ilness/Diagnosis: Nuclear sclerotic Cataract    Past Medical History:  has a past medical history of Coronary atherosclerosis, Headache, Hypercholesterolemia, and Myocardial infarction (2011).    Family History/Social History: refer to chart    Allergies:   Review of patient's allergies indicates:   Allergen Reactions    Celebrex [celecoxib] Rash       Current Medications: No current facility-administered medications for this encounter.    Current Outpatient Medications:     acarbose (PRECOSE) 25 MG Tab, , Disp: , Rfl:     alpha lipoic acid 200 mg Tab, , Disp: , Rfl:     aspirin (ECOTRIN) 81 MG EC tablet, Take 81 mg by mouth once daily., Disp: , Rfl:     atorvastatin (LIPITOR) 40 MG tablet, Take 1 tablet (40 mg total) by mouth once daily., Disp: 90 tablet, Rfl: 1    erythromycin with ethanoL (EMGEL) 2 % gel, Apply topically 2 (two) times daily. Prn breakouts of posterior neck, Disp: 30 g, Rfl: 3    finasteride (PROSCAR) 5 mg tablet, Take 1 tablet (5 mg total) by mouth once daily., Disp: 90 tablet, Rfl: 1    GREEN TEA EXTRACT ORAL, Take 1 capsule by mouth once daily., Disp: , Rfl:     levothyroxine (SYNTHROID, LEVOTHROID) 175 MCG tablet, Take 1 tablet (175 mcg total) by mouth before breakfast., Disp: 90 tablet, Rfl: 1    multivitamin (THERAGRAN) per tablet, Take 1 tablet by mouth once daily., Disp: , Rfl:     omega-3/dha/epa/fish oil (OMEGA-3 FISH OIL ORAL), Take 4 g by mouth once daily., Disp: , Rfl:     prednisolon/gatiflox/bromfenac (PREDNISOL ACE-GATIFLOX-BROMFEN) 1-0.5-0.075 % DrpS, Apply 1 drop to eye 3 (three) times daily., Disp: 5 mL, Rfl: 3    QUERCETIN DIHYDRATE, BULK, MISC, 1,000 mg by Misc.(Non-Drug; Combo Route) route once daily. Quercetin, Disp: , Rfl:     RESVERATROL ORAL, Take 200 mg by mouth. Resveratrol (Longevinex formulation), Disp: , Rfl:     sirolimus (RAPAMUNE) 1 MG Tab, Take 10 mg by mouth once a week., Disp: , Rfl:      tadalafiL (CIALIS) 5 MG tablet, Take 1 tablet (5 mg total) by mouth daily as needed for Erectile Dysfunction., Disp: 90 tablet, Rfl: 0    taurine 1,000 mg Cap, Take 6 g by mouth once daily., Disp: , Rfl:     triamcinolone acetonide 0.1% (KENALOG) 0.1 % ointment, Apply topically 2 (two) times daily., Disp: 15 g, Rfl: 0    turmeric (CURCUMIN MISC), by Misc.(Non-Drug; Combo Route) route., Disp: , Rfl:     Facility-Administered Medications Ordered in Other Encounters:     balanced salt irrigation intra-ocular solution 1 drop, 1 drop, Right Eye, On Call Procedure, Nessa Romo MD    phenylephrine HCL 10% ophthalmic solution 1 drop, 1 drop, Right Eye, PRN, Nessa Romo MD    sodium chloride 0.9% flush 2 mL, 2 mL, Intravenous, PRN, Nessa Romo MD    Physical Exam    BP: Vital signs stable  General: No apparent distress  HEENT: nuclear sclerotic cataract  Lungs: adequate respirations  Heart: + pulses  Abdomen: soft  Rectal/pelvic: deferred    Impression: Visually significant Cataract.    See previous clinic notes for surgical indications.    Plan: Phacoemulsification with implantation of Intraocular lens

## 2023-08-09 NOTE — DISCHARGE INSTRUCTIONS
Dr. Romo     Cataract Post-Operative Instructions       Day of surgery:     -Resume drops THREE times daily into the operative eye.     -Do not rub your eye     -Wear protective sunglasses during the day.     -Resume moderate activity.     -Bathe/shower/wash face normally     -Do not apply makeup around the operative eye for 1 week.     -You should expect:     Blurry vision and halos for 24-48 hours     Dilated pupil for 24-48 hours     Scratchy feeling in the eye for 1-2 days     Curved shadow in your peripheral vision for 2-3 weeks     Occasional flickering of lights for up to 1 week     -If you experience severe pain or nausea, call Dr. Romo or the on-call doctor at [phone].       Plan to see Dr. Romo tomorrow at:     [facility] [address]     10TH FLOOR     [facility] [id number]     **Most patients can drive the next morning.  If you do not feel comfortable driving, please arrange for transportation. **

## 2023-08-09 NOTE — PROGRESS NOTES
Pt arrived at facility with no ride home. Pt was explained hospital's policy on 8/08/23 by Jennifer Gaines RN via phone message and Pt portal. On 8/09/23 pt was once again explained the hospital policy by JAD Mulligan and JAD Sims (Charge nurse) Pt attempted to call friend but was unable to secure a ride home. Pt decided to reschedule his procedure. Pt was walked out by JAD Sims. Dr Romo notified

## 2023-08-11 ENCOUNTER — TELEPHONE (OUTPATIENT)
Dept: OPHTHALMOLOGY | Facility: CLINIC | Age: 73
End: 2023-08-11
Payer: COMMERCIAL

## 2023-08-14 ENCOUNTER — TELEPHONE (OUTPATIENT)
Dept: OPHTHALMOLOGY | Facility: CLINIC | Age: 73
End: 2023-08-14
Payer: COMMERCIAL

## 2023-08-14 NOTE — TELEPHONE ENCOUNTER
RYANM with arrival time of 10:30 for sx on 8/16/23 with Dr. Room @[facility]. Pt has eyedrops and packet

## 2023-08-15 ENCOUNTER — PATIENT MESSAGE (OUTPATIENT)
Dept: SURGERY | Facility: CLINIC | Age: 73
End: 2023-08-15
Payer: COMMERCIAL

## 2023-08-15 NOTE — PRE-PROCEDURE INSTRUCTIONS
- Nothing to eat or drink after midinight, except AM meds with small sips of water, Gatorade/Powerade  - Hold all Diabetic meds AM of surgery  - Hold all Insulin AM of surgery  - Hold all Fluid pills AM of surgery  - Hold all non-insulin shots until after surgery (Ozempic, Mounjaro, Trulicity, Victoza, Byetta, Wegovy and Adlyxin) (up to 7 days prior)  - Take all B/P meds, except those that contain a fluid pill  - Hold all vits and herbal meds AM of surgery  - Use inhalers as needed and bring AM of surgery  - Take blood thinner meds AM of surgery  - Use eye drops as directed  - Shower and wash face with dial soap for 3 mins PM prior and AM of surgery  - No powder, lotions, creams, (makeup),  or jewelry    - Wear comfortable clothing (button up shirt)    (Patients ride may not leave while patient is in surgery)    -- 2nd floor surgery ctr at [facility] @ [address] [place] [address]       Pt voiced understanding

## 2023-08-16 ENCOUNTER — ANESTHESIA (OUTPATIENT)
Dept: SURGERY | Facility: HOSPITAL | Age: 73
End: 2023-08-16
Payer: COMMERCIAL

## 2023-08-16 ENCOUNTER — ANESTHESIA EVENT (OUTPATIENT)
Dept: SURGERY | Facility: HOSPITAL | Age: 73
End: 2023-08-16
Payer: COMMERCIAL

## 2023-08-16 ENCOUNTER — HOSPITAL ENCOUNTER (OUTPATIENT)
Facility: HOSPITAL | Age: 73
Discharge: HOME | End: 2023-08-16
Attending: OPHTHALMOLOGY | Admitting: OPHTHALMOLOGY
Payer: COMMERCIAL

## 2023-08-16 VITALS
TEMPERATURE: 98 F | OXYGEN SATURATION: 96 % | BODY MASS INDEX: 25.2 KG/M2 | HEART RATE: 62 BPM | SYSTOLIC BLOOD PRESSURE: 153 MMHG | WEIGHT: 180 LBS | DIASTOLIC BLOOD PRESSURE: 74 MMHG | RESPIRATION RATE: 18 BRPM | HEIGHT: 71 IN

## 2023-08-16 DIAGNOSIS — H25.12 NUCLEAR SCLEROTIC CATARACT OF LEFT EYE: Primary | ICD-10-CM

## 2023-08-16 DIAGNOSIS — H25.12 AGE-RELATED NUCLEAR CATARACT, LEFT: ICD-10-CM

## 2023-08-16 PROCEDURE — V2632 POST CHMBR INTRAOCULAR LENS: HCPCS | Performed by: OPHTHALMOLOGY

## 2023-08-16 PROCEDURE — 36000706: Performed by: OPHTHALMOLOGY

## 2023-08-16 PROCEDURE — 94761 N-INVAS EAR/PLS OXIMETRY MLT: CPT

## 2023-08-16 PROCEDURE — 66984 PR REMOVAL, CATARACT, W/INSRT INTRAOC LENS, W/O ENDO CYCLO: ICD-10-PCS | Mod: LT,,, | Performed by: OPHTHALMOLOGY

## 2023-08-16 PROCEDURE — 99900035 HC TECH TIME PER 15 MIN (STAT)

## 2023-08-16 PROCEDURE — 25000003 PHARM REV CODE 250: Performed by: OPHTHALMOLOGY

## 2023-08-16 PROCEDURE — 36000707: Performed by: OPHTHALMOLOGY

## 2023-08-16 PROCEDURE — 66984 XCAPSL CTRC RMVL W/O ECP: CPT | Mod: LT,,, | Performed by: OPHTHALMOLOGY

## 2023-08-16 PROCEDURE — 71000015 HC POSTOP RECOV 1ST HR: Performed by: OPHTHALMOLOGY

## 2023-08-16 DEVICE — LENS EYHANCE +15.0D: Type: IMPLANTABLE DEVICE | Site: EYE | Status: FUNCTIONAL

## 2023-08-16 RX ORDER — SODIUM CHLORIDE 0.9 % (FLUSH) 0.9 %
2 SYRINGE (ML) INJECTION
Status: ACTIVE | OUTPATIENT
Start: 2023-08-16

## 2023-08-16 RX ORDER — LIDOCAINE HYDROCHLORIDE 10 MG/ML
INJECTION, SOLUTION EPIDURAL; INFILTRATION; INTRACAUDAL; PERINEURAL
Status: DISCONTINUED | OUTPATIENT
Start: 2023-08-16 | End: 2023-08-16 | Stop reason: HOSPADM

## 2023-08-16 RX ORDER — LIDOCAINE HYDROCHLORIDE 40 MG/ML
INJECTION, SOLUTION RETROBULBAR
Status: DISCONTINUED | OUTPATIENT
Start: 2023-08-16 | End: 2023-08-16 | Stop reason: HOSPADM

## 2023-08-16 RX ORDER — CYCLOP/TROP/PROPA/PHEN/KET/WAT 1-1-0.1%
1 DROPS (EA) OPHTHALMIC (EYE)
Status: COMPLETED | OUTPATIENT
Start: 2023-08-16 | End: 2023-08-16

## 2023-08-16 RX ORDER — MOXIFLOXACIN 5 MG/ML
SOLUTION/ DROPS OPHTHALMIC
Status: DISCONTINUED | OUTPATIENT
Start: 2023-08-16 | End: 2023-08-16 | Stop reason: HOSPADM

## 2023-08-16 RX ORDER — MOXIFLOXACIN 5 MG/ML
1 SOLUTION/ DROPS OPHTHALMIC
Status: COMPLETED | OUTPATIENT
Start: 2023-08-16 | End: 2023-08-16

## 2023-08-16 RX ORDER — TETRACAINE HYDROCHLORIDE 5 MG/ML
SOLUTION OPHTHALMIC
Status: DISCONTINUED | OUTPATIENT
Start: 2023-08-16 | End: 2023-08-16 | Stop reason: HOSPADM

## 2023-08-16 RX ORDER — PROPARACAINE HYDROCHLORIDE 5 MG/ML
1 SOLUTION/ DROPS OPHTHALMIC
Status: ACTIVE | OUTPATIENT
Start: 2023-08-16

## 2023-08-16 RX ORDER — PROPARACAINE HYDROCHLORIDE 5 MG/ML
1 SOLUTION/ DROPS OPHTHALMIC
Status: DISCONTINUED | OUTPATIENT
Start: 2023-08-16 | End: 2023-08-16 | Stop reason: HOSPADM

## 2023-08-16 RX ORDER — PREDNISOLONE ACETATE 10 MG/ML
SUSPENSION/ DROPS OPHTHALMIC
Status: DISCONTINUED | OUTPATIENT
Start: 2023-08-16 | End: 2023-08-16 | Stop reason: HOSPADM

## 2023-08-16 RX ORDER — SODIUM CHLORIDE 0.9 % (FLUSH) 0.9 %
10 SYRINGE (ML) INJECTION DAILY PRN
Status: DISCONTINUED | OUTPATIENT
Start: 2023-08-16 | End: 2023-08-16 | Stop reason: HOSPADM

## 2023-08-16 RX ORDER — ACETAMINOPHEN 325 MG/1
650 TABLET ORAL EVERY 4 HOURS PRN
Status: DISCONTINUED | OUTPATIENT
Start: 2023-08-16 | End: 2023-08-16 | Stop reason: HOSPADM

## 2023-08-16 RX ORDER — PHENYLEPHRINE HYDROCHLORIDE 100 MG/ML
1 SOLUTION/ DROPS OPHTHALMIC
Status: ACTIVE | OUTPATIENT
Start: 2023-08-16

## 2023-08-16 RX ORDER — TETRACAINE HYDROCHLORIDE 5 MG/ML
1 SOLUTION OPHTHALMIC
Status: ACTIVE | OUTPATIENT
Start: 2023-08-16

## 2023-08-16 RX ADMIN — Medication 1 DROP: at 12:08

## 2023-08-16 RX ADMIN — MOXIFLOXACIN OPHTHALMIC 1 DROP: 5 SOLUTION/ DROPS OPHTHALMIC at 11:08

## 2023-08-16 RX ADMIN — MOXIFLOXACIN OPHTHALMIC 1 DROP: 5 SOLUTION/ DROPS OPHTHALMIC at 12:08

## 2023-08-16 RX ADMIN — MOXIFLOXACIN 1 DROP: 5 SOLUTION/ DROPS OPHTHALMIC at 01:08

## 2023-08-16 RX ADMIN — Medication 1 DROP: at 11:08

## 2023-08-16 NOTE — H&P
History    Chief complaint:  Painless progressive vision loss    Present Ilness/Diagnosis: Nuclear sclerotic Cataract    Past Medical History:  has a past medical history of Coronary atherosclerosis, Headache, Hypercholesterolemia, and Myocardial infarction (2011).    Family History/Social History: refer to chart    Allergies:   Review of patient's allergies indicates:   Allergen Reactions    Celebrex [celecoxib] Rash       Current Medications: No current facility-administered medications for this encounter.    Current Outpatient Medications:     acarbose (PRECOSE) 25 MG Tab, , Disp: , Rfl:     alpha lipoic acid 200 mg Tab, , Disp: , Rfl:     aspirin (ECOTRIN) 81 MG EC tablet, Take 81 mg by mouth once daily., Disp: , Rfl:     atorvastatin (LIPITOR) 40 MG tablet, Take 1 tablet (40 mg total) by mouth once daily., Disp: 90 tablet, Rfl: 1    finasteride (PROSCAR) 5 mg tablet, Take 1 tablet (5 mg total) by mouth once daily., Disp: 90 tablet, Rfl: 1    GREEN TEA EXTRACT ORAL, Take 1 capsule by mouth once daily., Disp: , Rfl:     levothyroxine (SYNTHROID, LEVOTHROID) 175 MCG tablet, Take 1 tablet (175 mcg total) by mouth before breakfast., Disp: 90 tablet, Rfl: 1    multivitamin (THERAGRAN) per tablet, Take 1 tablet by mouth once daily., Disp: , Rfl:     omega-3/dha/epa/fish oil (OMEGA-3 FISH OIL ORAL), Take 4 g by mouth once daily., Disp: , Rfl:     QUERCETIN DIHYDRATE, BULK, MISC, 1,000 mg by Misc.(Non-Drug; Combo Route) route once daily. Quercetin, Disp: , Rfl:     RESVERATROL ORAL, Take 200 mg by mouth. Resveratrol (Longevinex formulation), Disp: , Rfl:     sirolimus (RAPAMUNE) 1 MG Tab, Take 10 mg by mouth once a week. Sat, Disp: , Rfl:     taurine 1,000 mg Cap, Take 6 g by mouth once daily., Disp: , Rfl:     turmeric (CURCUMIN MISC), by Misc.(Non-Drug; Combo Route) route., Disp: , Rfl:     erythromycin with ethanoL (EMGEL) 2 % gel, Apply topically 2 (two) times daily. Prn breakouts of posterior neck, Disp: 30 g, Rfl:  3    prednisolon/gatiflox/bromfenac (PREDNISOL ACE-GATIFLOX-BROMFEN) 1-0.5-0.075 % DrpS, Apply 1 drop to eye 3 (three) times daily., Disp: 5 mL, Rfl: 3    tadalafiL (CIALIS) 5 MG tablet, Take 1 tablet (5 mg total) by mouth daily as needed for Erectile Dysfunction., Disp: 90 tablet, Rfl: 0    triamcinolone acetonide 0.1% (KENALOG) 0.1 % ointment, Apply topically 2 (two) times daily., Disp: 15 g, Rfl: 0    Facility-Administered Medications Ordered in Other Encounters:     balanced salt irrigation intra-ocular solution 1 drop, 1 drop, Right Eye, On Call Procedure, Nessa Romo MD    phenylephrine HCL 10% ophthalmic solution 1 drop, 1 drop, Right Eye, PRN, Nessa Romo MD    sodium chloride 0.9% flush 2 mL, 2 mL, Intravenous, PRN, Nessa Romo MD    Physical Exam    BP: Vital signs stable  General: No apparent distress  HEENT: nuclear sclerotic cataract  Lungs: adequate respirations  Heart: + pulses  Abdomen: soft  Rectal/pelvic: deferred    Impression: Visually significant Cataract.    See previous clinic notes for surgical indications.    Plan: Phacoemulsification with implantation of Intraocular lens

## 2023-08-16 NOTE — OP NOTE
DATE OF PROCEDURE: 08/16/2023    SURGEON: ISELA AQUINO MD    PREOPERATIVE DIAGNOSIS:  Senile nuclear sclerotic cataract left eye.     POSTOPERATIVE DIAGNOSIS: Senile nuclear sclerotic cataract left eye.     PROCEDURE PERFORMED:  Phacoemulsification with placement of intraocular lens, left eye.    IMPLANT:  DIB00 15.0    ANESTHESIA:  Topical      COMPLICATIONS: none    ESTIMATED BLOOD LOSS: <1cc    SPECIMENS: none    INDICATIONS FOR PROCEDURE:   The patient has a history of painless progressive vision loss.  The patient has described difficulties with activities of daily living, which specifically include driving, which is secondary to cataract formation and progression. After we had a thorough discussion about risks, benefits, and alternatives to cataract surgery, the patient agreed to proceed with phacoemulsification and implantation of a lens in the left eye.  These risks include, but are not limited to, hemorrhage, pain, infection, need for additional surgery, need for glasses or contacts, loss of vision, or even loss of the eye.    PROCEDURE IN DETAIL:  The patient was met in the preop holding area.  Consent was confirmed to be signed.  The operative site was marked.  The patient was brought into the operating room by the anesthesia team and placed under monitored anesthesia care.  The left eye was prepped and draped in a sterile ophthalmic fashion.  A Phong speculum was placed into the left eye.   A paracentesis site was made and 1% preservative-free lidocaine was injected into the anterior chamber.  Viscoelastic  material was injected into the anterior chamber.  A keratome blade was used to make a clear corneal incision.  A cystotome was used to initiate the continuous curvilinear capsulorrhexis which was completed with Utrata forceps.  BSS on a hardwick cannula was used to perform hydrodissection.  The phacoemulsification tip was introduced into the eye and the nucleus was removed in a standard  divide-and-conquer fashion.  Remaining cortical material was removed from the eye using irrigation-aspiration.  The capsular bag was filled with viscoelastic material and the intraocular lens was injected and positioned into place. Remaining viscoelastic material was removed from the eye using irrigation and aspiration.  The corneal wounds were hydrated.  The eye was filled to physiologic pressure. The wounds were found to be watertight. Drops of Vigamox and prednisilone were placed into the eye.  The eye was washed, dried, and shielded.  The patient tolerated the procedure well and knows to follow up with me tomorrow morning, sooner if needed.

## 2023-08-16 NOTE — DISCHARGE INSTRUCTIONS
Dr. Romo     Cataract Post-Operative Instructions       Day of surgery:     -Resume drops THREE times daily into the operative eye.     -Do not rub your eye     -Wear protective sunglasses during the day.     -Resume moderate activity.     -Bathe/shower/wash face normally     -Do not apply makeup around the operative eye for 1 week.     -You should expect:     Blurry vision and halos for 24-48 hours     Dilated pupil for 24-48 hours     Scratchy feeling in the eye for 1-2 days     Curved shadow in your peripheral vision for 2-3 weeks     Occasional flickering of lights for up to 1 week     -If you experience severe pain or nausea, call Dr. Romo or the on-call doctor at [phone].       Plan to see Dr. Romo tomorrow at:     [facility] [address]     10TH FLOOR     [facility] [address]     **Most patients can drive the next morning.  If you do not feel comfortable driving, please arrange for transportation. **

## 2023-08-16 NOTE — DISCHARGE SUMMARY
[facility]  Discharge Note  Short Stay    Procedure(s) (LRB):  EXTRACTION, CATARACT, WITH IOL INSERTION (Left)BRIEF DISCHARGE NOTE:    Date of discharge: 08/16/2023    Reason for hospitalization -  Cataract surgery     Final Diagnosis - Visually significant Cataract    Procedures and treatment provided - Status post phacoemulsification with placement of intraocular lens     Diet - Advance to regular as tolerated    Activity - as tolerated    Disposition at the end of the case - Good.    Discharge: to home    The patient tolerated the procedure well and knows to follow up with me tomorrow morning in the eye clinic, sooner if needed.    Patient and family instructions (as appropriate) - Given to patient on discharge    Nessa Romo MD

## 2023-08-16 NOTE — ANESTHESIA POSTPROCEDURE EVALUATION
Anesthesia Post Evaluation    Patient: Thiago Thacker    Procedure(s) Performed: Procedure(s) (LRB):  EXTRACTION, CATARACT, WITH IOL INSERTION (Left)    Final Anesthesia Type: local      Patient location during evaluation: PACU  Patient participation: Yes- Able to Participate  Level of consciousness: awake and alert  Post-procedure vital signs: reviewed and stable  Pain management: adequate  Airway patency: patent    PONV status at discharge: No PONV  Anesthetic complications: no      Cardiovascular status: blood pressure returned to baseline  Respiratory status: unassisted, spontaneous ventilation and room air  Hydration status: euvolemic  Follow-up not needed.          Vitals Value Taken Time   /82 08/16/23 1345     08/16/23 1404   Pulse 59 08/16/23 1345   Resp 18 08/16/23 1345   SpO2 94 % 08/16/23 1345         No case tracking events are documented in the log.      Pain/Yesenia Score: Yesenia Score: 10 (8/16/2023  1:31 PM)

## 2023-08-16 NOTE — ANESTHESIA PREPROCEDURE EVALUATION
08/16/2023  [facility]  Anesthesia Pre-Operative Evaluation         Patient Name: Thiago Thacker  YOB: 1950  MRN: [id number]    SUBJECTIVE:     Pre-operative evaluation for Procedure(s) (LRB):  EXTRACTION, CATARACT, WITH IOL INSERTION (Left)     08/16/2023    Thiago Thacker is a 73 y.o. male.  Spoke to patient at length about [facility] policy requiring each patient have a responsible adult available to take the patient home and care for them after sedation.  Patient only has a non medical transport with him today.  Given the option to post pone surgery until a responsible party can bring him home or to preform the procedure without sedation, he elected to have the procedure preformed today without sedation.     Patient now presents for the above procedure(s).      LDA:        Peripheral IV - Single Lumen 08/16/23 1237 22 G Right Antecubital (Active)   Site Assessment Clean;Dry;Intact 08/16/23 1227   Line Status Blood return noted;Flushed;Saline locked 08/16/23 1227   Dressing Status Clean;Dry;Intact 08/16/23 1227   Dressing Intervention First dressing 08/16/23 1227   Number of days: 0       Patient Active Problem List   Diagnosis    BPH (benign prostatic hyperplasia)    Hypercholesterolemia    Hypothyroid    Muscle weakness of lower extremity    Decreased range of motion (ROM) of right knee    Coronary atherosclerosis    Hx of myocardial infarction    Thrombocytopenia    Osteoarthritis of right knee    Cervical myofascial pain syndrome    Rash of neck       Review of patient's allergies indicates:   Allergen Reactions    Celebrex [celecoxib] Rash       Current Inpatient Medications:      Current Facility-Administered Medications on File Prior to Encounter   Medication Dose Route Frequency Provider Last Rate Last Admin    balanced salt irrigation intra-ocular  solution 1 drop  1 drop Right Eye On Call Procedure Nessa Romo MD        phenylephrine HCL 10% ophthalmic solution 1 drop  1 drop Right Eye PRN Nessa Romo MD        sodium chloride 0.9% flush 2 mL  2 mL Intravenous PRN Nessa Romo MD         Current Outpatient Medications on File Prior to Encounter   Medication Sig Dispense Refill    acarbose (PRECOSE) 25 MG Tab       alpha lipoic acid 200 mg Tab       aspirin (ECOTRIN) 81 MG EC tablet Take 81 mg by mouth once daily.      atorvastatin (LIPITOR) 40 MG tablet Take 1 tablet (40 mg total) by mouth once daily. 90 tablet 1    finasteride (PROSCAR) 5 mg tablet Take 1 tablet (5 mg total) by mouth once daily. 90 tablet 1    GREEN TEA EXTRACT ORAL Take 1 capsule by mouth once daily.      levothyroxine (SYNTHROID, LEVOTHROID) 175 MCG tablet Take 1 tablet (175 mcg total) by mouth before breakfast. 90 tablet 1    multivitamin (THERAGRAN) per tablet Take 1 tablet by mouth once daily.      omega-3/dha/epa/fish oil (OMEGA-3 FISH OIL ORAL) Take 4 g by mouth once daily.      prednisolon/gatiflox/bromfenac (PREDNISOL ACE-GATIFLOX-BROMFEN) 1-0.5-0.075 % DrpS Apply 1 drop to eye 3 (three) times daily. 5 mL 3    QUERCETIN DIHYDRATE, BULK, MISC 1,000 mg by Misc.(Non-Drug; Combo Route) route once daily. Quercetin      RESVERATROL ORAL Take 200 mg by mouth. Resveratrol (Longevinex formulation)      sirolimus (RAPAMUNE) 1 MG Tab Take 10 mg by mouth once a week. Sat      tadalafiL (CIALIS) 5 MG tablet Take 1 tablet (5 mg total) by mouth daily as needed for Erectile Dysfunction. 90 tablet 0    taurine 1,000 mg Cap Take 6 g by mouth once daily.      turmeric (CURCUMIN MISC) by Misc.(Non-Drug; Combo Route) route.      erythromycin with ethanoL (EMGEL) 2 % gel Apply topically 2 (two) times daily. Prn breakouts of posterior neck 30 g 3    triamcinolone acetonide 0.1% (KENALOG) 0.1 % ointment Apply topically 2 (two) times daily. 15 g 0       Past Surgical  History:   Procedure Laterality Date    BLEPHAROPLASTY      CATARACT EXTRACTION W/  INTRAOCULAR LENS IMPLANT Right 6/1/2023    Procedure: EXTRACTION, CATARACT, WITH IOL INSERTION;  Surgeon: Nessa Romo MD;  Location: [facility];  Service: Ophthalmology;  Laterality: Right;    COMPLETE LASER PHOTOVAPORIZATION OF PROSTATE      CORONARY STENT PLACEMENT  2011    INGUINAL HERNIA REPAIR      vein         OBJECTIVE:     Vital Signs Range (Last 24H):  Temp:  [36.4 °C (97.5 °F)]   Pulse:  [63]   Resp:  [19]   BP: (162)/(75)   SpO2:  [99 %]       Significant Labs:  Lab Results   Component Value Date    WBC 3.04 (L) 07/10/2023    HGB 14.4 07/10/2023    HCT 43.8 07/10/2023     (L) 07/10/2023    CHOL 135 07/10/2023    TRIG 63 07/10/2023    HDL 46 07/10/2023    ALT 27 07/10/2023    AST 34 07/10/2023     07/10/2023    K 4.2 07/10/2023     07/10/2023    CREATININE 1.1 07/10/2023    BUN 19 07/10/2023    CO2 25 07/10/2023    TSH 2.808 07/10/2023    PSA 0.64 01/25/2022       Diagnostic Studies: No relevant studies.    EKG:   Results for orders placed or performed in visit on 04/22/22   IN OFFICE EKG 12-LEAD (to [place])    Collection Time: 04/22/22 10:54 AM    Narrative    Test Reason : I25.10,    Vent. Rate : 056 BPM     Atrial Rate : 056 BPM     P-R Int : 190 ms          QRS Dur : 098 ms      QT Int : 402 ms       P-R-T Axes : 062 065 061 degrees     QTc Int : 387 ms    Sinus bradycardia  Minimal voltage criteria for LVH, may be normal variant  No previous ECGs available  Confirmed by Moni HINTON, Todd YUEN ([id number]) on 4/29/2022 2:47:24 PM    Referred By: SELINA ROTH           Confirmed By:Todd Montenegro MD       ASSESSMENT/PLAN:           Pre-op Assessment    I have reviewed the Patient Summary Reports.    I have reviewed the NPO Status.   I have reviewed the Medications.     Review of Systems  Anesthesia Hx:  No problems with previous Anesthesia  Denies Family Hx of Anesthesia complications.   Denies Personal Hx  of Anesthesia complications.   Cardiovascular:   Past MI CAD   hyperlipidemia NYHA Classification II    Musculoskeletal:   Arthritis     Neurological:   Neuromuscular Disease, Headaches    Endocrine:   Hypothyroidism       Coronary atherosclerosis      Headache      Hypercholesterolemia      Myocardial infarction 2011            Past Surgical History:   Procedure Laterality Date    BLEPHAROPLASTY        COMPLETE LASER PHOTOVAPORIZATION OF PROSTATE        CORONARY STENT PLACEMENT   2011    INGUINAL HERNIA REPAIR           Physical Exam  General: Well nourished, Cooperative, Alert and Oriented    Airway:  Mallampati: II   Mouth Opening: Normal  TM Distance: Normal  Tongue: Normal  Neck ROM: Normal ROM    Dental:  Caps / Implants        Anesthesia Plan  Type of Anesthesia, risks & benefits discussed:    Anesthesia Type: MAC  Intra-op Monitoring Plan: Standard ASA Monitors  Post Op Pain Control Plan: multimodal analgesia and IV/PO Opioids PRN  Induction:  IV  Informed Consent: Informed consent signed with the Patient and all parties understand the risks and agree with anesthesia plan.  All questions answered.   ASA Score: 2  Day of Surgery Review of History & Physical: H&P Update referred to the surgeon/provider.I have interviewed and examined the patient. I have reviewed the patient's H&P dated: There are no significant changes. H&P completed by Anesthesiologist.    Ready For Surgery From Anesthesia Perspective.     .                                                                                                                 08/16/2023  [facility]  Anesthesia Pre-Operative Evaluation         Patient Name: Thiago Thacker  YOB: 1950  MRN: [id number]    SUBJECTIVE:     Pre-operative evaluation for Procedure(s) (LRB):  EXTRACTION, CATARACT, WITH IOL INSERTION (Left)     08/16/2023    Thiago Thacker is a 73 y.o. male  Patient now presents for the above procedure(s).    TTE:     The left ventricle is normal in size with normal systolic function.   The estimated ejection fraction is 65%.   Grade II left ventricular diastolic dysfunction.   Normal right ventricular size with normal right ventricular systolic function.   Moderate aortic valve sclerosis.   Mild mitral regurgitation.   Mild tricuspid regurgitation.   The estimated PA systolic pressure is 20 mmHg.   Normal central venous pressure (3 mmHg).    Prev airway: none documented.      Patient Active Problem List   Diagnosis    BPH (benign prostatic hyperplasia)    Hypercholesterolemia    Hypothyroid    Muscle weakness of lower extremity    Decreased range of motion (ROM) of right knee    Coronary atherosclerosis    Hx of myocardial infarction    Thrombocytopenia    Osteoarthritis of right knee    Cervical myofascial pain syndrome    Rash of neck       Review of patient's allergies indicates:   Allergen Reactions    Celebrex [celecoxib] Rash       Current Inpatient Medications:      Current Facility-Administered Medications on File Prior to Encounter   Medication Dose Route Frequency Provider Last Rate Last Admin    balanced salt irrigation intra-ocular solution 1 drop  1 drop Right Eye On Call Procedure Nessa Romo MD        phenylephrine HCL 10% ophthalmic solution 1 drop  1 drop Right Eye PRN Nessa Romo MD        sodium chloride 0.9% flush 2 mL  2 mL Intravenous PRN Nessa Romo MD         Current Outpatient Medications on File Prior to Encounter   Medication Sig Dispense Refill    acarbose (PRECOSE) 25 MG Tab       alpha lipoic acid 200 mg Tab       aspirin (ECOTRIN) 81 MG EC tablet Take 81 mg by mouth once daily.      atorvastatin (LIPITOR) 40 MG tablet Take 1 tablet (40 mg total) by mouth once daily. 90 tablet 1    finasteride (PROSCAR) 5 mg tablet Take 1 tablet (5 mg total) by mouth once daily. 90 tablet 1    GREEN TEA EXTRACT ORAL Take 1 capsule by mouth once daily.       levothyroxine (SYNTHROID, LEVOTHROID) 175 MCG tablet Take 1 tablet (175 mcg total) by mouth before breakfast. 90 tablet 1    multivitamin (THERAGRAN) per tablet Take 1 tablet by mouth once daily.      omega-3/dha/epa/fish oil (OMEGA-3 FISH OIL ORAL) Take 4 g by mouth once daily.      QUERCETIN DIHYDRATE, BULK, MISC 1,000 mg by Misc.(Non-Drug; Combo Route) route once daily. Quercetin      RESVERATROL ORAL Take 200 mg by mouth. Resveratrol (Longevinex formulation)      sirolimus (RAPAMUNE) 1 MG Tab Take 10 mg by mouth once a week. Sat      taurine 1,000 mg Cap Take 6 g by mouth once daily.      turmeric (CURCUMIN MISC) by Misc.(Non-Drug; Combo Route) route.      erythromycin with ethanoL (EMGEL) 2 % gel Apply topically 2 (two) times daily. Prn breakouts of posterior neck 30 g 3    prednisolon/gatiflox/bromfenac (PREDNISOL ACE-GATIFLOX-BROMFEN) 1-0.5-0.075 % DrpS Apply 1 drop to eye 3 (three) times daily. 5 mL 3    tadalafiL (CIALIS) 5 MG tablet Take 1 tablet (5 mg total) by mouth daily as needed for Erectile Dysfunction. 90 tablet 0    triamcinolone acetonide 0.1% (KENALOG) 0.1 % ointment Apply topically 2 (two) times daily. 15 g 0       Past Surgical History:   Procedure Laterality Date    BLEPHAROPLASTY      CATARACT EXTRACTION W/  INTRAOCULAR LENS IMPLANT Right 6/1/2023    Procedure: EXTRACTION, CATARACT, WITH IOL INSERTION;  Surgeon: Nessa Romo MD;  Location: [facility];  Service: Ophthalmology;  Laterality: Right;    COMPLETE LASER PHOTOVAPORIZATION OF PROSTATE      CORONARY STENT PLACEMENT  2011    INGUINAL HERNIA REPAIR      vein         OBJECTIVE:     Vital Signs Range (Last 24H):         Significant Labs:  Lab Results   Component Value Date    WBC 3.04 (L) 07/10/2023    HGB 14.4 07/10/2023    HCT 43.8 07/10/2023     (L) 07/10/2023    CHOL 135 07/10/2023    TRIG 63 07/10/2023    HDL 46 07/10/2023    ALT 27 07/10/2023    AST 34 07/10/2023     07/10/2023    K 4.2 07/10/2023      07/10/2023    CREATININE 1.1 07/10/2023    BUN 19 07/10/2023    CO2 25 07/10/2023    TSH 2.808 07/10/2023    PSA 0.64 01/25/2022       Diagnostic Studies: No relevant studies.    EKG:   Results for orders placed or performed in visit on 04/22/22   IN OFFICE EKG 12-LEAD (to [place])    Collection Time: 04/22/22 10:54 AM    Narrative    Test Reason : I25.10,    Vent. Rate : 056 BPM     Atrial Rate : 056 BPM     P-R Int : 190 ms          QRS Dur : 098 ms      QT Int : 402 ms       P-R-T Axes : 062 065 061 degrees     QTc Int : 387 ms    Sinus bradycardia  Minimal voltage criteria for LVH, may be normal variant  No previous ECGs available  Confirmed by Moni HINTON, Todd YUEN ([id number]) on 4/29/2022 2:47:24 PM    Referred By: SELINA ROTH           Confirmed By:Todd Montenegro MD       ASSESSMENT/PLAN:           Pre-op Assessment    I have reviewed the Patient Summary Reports.    I have reviewed the NPO Status.   I have reviewed the Medications.     Review of Systems  Anesthesia Hx:  No problems with previous Anesthesia  History of prior surgery of interest to airway management or planning: Denies Family Hx of Anesthesia complications.   Denies Personal Hx of Anesthesia complications.   EENT/Dental:   Eyes:   Cardiovascular:   Past MI CAD  CABG/stent  hyperlipidemia NYHA Classification II    Renal/:   BPH    Musculoskeletal:   Arthritis     Neurological:   Neuromuscular Disease, Headaches    Endocrine:   Hypothyroidism        Physical Exam    Dental:        Anesthesia Plan  Type of Anesthesia, risks & benefits discussed:    Anesthesia Type: MAC  ASA Score: 3    .

## 2023-08-16 NOTE — PLAN OF CARE
Discharge instructions given to patient/   verbalized understanding of all.  VSS, denies n/v and tolerating PO, rates pain level tolerable. IV removed, and family notified for patient discharge home.

## 2023-08-17 ENCOUNTER — OFFICE VISIT (OUTPATIENT)
Dept: OPHTHALMOLOGY | Facility: CLINIC | Age: 73
End: 2023-08-17
Payer: COMMERCIAL

## 2023-08-17 ENCOUNTER — HOSPITAL ENCOUNTER (OUTPATIENT)
Dept: RADIOLOGY | Facility: HOSPITAL | Age: 73
Discharge: HOME | End: 2023-08-17
Attending: INTERNAL MEDICINE
Payer: COMMERCIAL

## 2023-08-17 ENCOUNTER — OFFICE VISIT (OUTPATIENT)
Dept: HEMATOLOGY/ONCOLOGY | Facility: CLINIC | Age: 73
End: 2023-08-17
Payer: COMMERCIAL

## 2023-08-17 VITALS
HEART RATE: 61 BPM | WEIGHT: 187.06 LBS | SYSTOLIC BLOOD PRESSURE: 124 MMHG | TEMPERATURE: 98 F | DIASTOLIC BLOOD PRESSURE: 69 MMHG | OXYGEN SATURATION: 95 % | HEIGHT: 71 IN | BODY MASS INDEX: 26.19 KG/M2 | RESPIRATION RATE: 16 BRPM

## 2023-08-17 DIAGNOSIS — H18.452 SALZMANN'S NODULAR DEGENERATION OF CORNEA OF LEFT EYE: ICD-10-CM

## 2023-08-17 DIAGNOSIS — R05.2 SUBACUTE COUGH: ICD-10-CM

## 2023-08-17 DIAGNOSIS — Z98.42 STATUS POST CATARACT EXTRACTION AND INSERTION OF INTRAOCULAR LENS, LEFT: ICD-10-CM

## 2023-08-17 DIAGNOSIS — D84.821 IMMUNODEFICIENCY DUE TO DRUG THERAPY: ICD-10-CM

## 2023-08-17 DIAGNOSIS — H16.223 KERATOCONJUNCTIVITIS SICCA NOT SPECIFIED AS SJOGREN'S, BILATERAL: ICD-10-CM

## 2023-08-17 DIAGNOSIS — D69.6 THROMBOCYTOPENIA: Primary | ICD-10-CM

## 2023-08-17 DIAGNOSIS — D72.810 LYMPHOPENIA: ICD-10-CM

## 2023-08-17 DIAGNOSIS — Z96.1 STATUS POST CATARACT EXTRACTION AND INSERTION OF INTRAOCULAR LENS, LEFT: ICD-10-CM

## 2023-08-17 DIAGNOSIS — Z79.899 IMMUNODEFICIENCY DUE TO DRUG THERAPY: ICD-10-CM

## 2023-08-17 DIAGNOSIS — Z98.41 STATUS POST CATARACT EXTRACTION AND INSERTION OF INTRAOCULAR LENS, RIGHT: Primary | ICD-10-CM

## 2023-08-17 DIAGNOSIS — Z96.1 STATUS POST CATARACT EXTRACTION AND INSERTION OF INTRAOCULAR LENS, RIGHT: Primary | ICD-10-CM

## 2023-08-17 PROCEDURE — 99205 PR OFFICE/OUTPT VISIT, NEW, LEVL V, 60-74 MIN: ICD-10-PCS | Mod: S$GLB,,, | Performed by: INTERNAL MEDICINE

## 2023-08-17 PROCEDURE — 71046 X-RAY EXAM CHEST 2 VIEWS: CPT | Mod: TC

## 2023-08-17 PROCEDURE — 3078F PR MOST RECENT DIASTOLIC BLOOD PRESSURE < 80 MM HG: ICD-10-PCS | Mod: CPTII,S$GLB,, | Performed by: INTERNAL MEDICINE

## 2023-08-17 PROCEDURE — 3074F SYST BP LT 130 MM HG: CPT | Mod: CPTII,S$GLB,, | Performed by: INTERNAL MEDICINE

## 2023-08-17 PROCEDURE — 71046 X-RAY EXAM CHEST 2 VIEWS: CPT | Mod: 26,,, | Performed by: RADIOLOGY

## 2023-08-17 PROCEDURE — 71046 XR CHEST PA AND LATERAL: ICD-10-PCS | Mod: 26,,, | Performed by: RADIOLOGY

## 2023-08-17 PROCEDURE — 3008F BODY MASS INDEX DOCD: CPT | Mod: CPTII,S$GLB,, | Performed by: INTERNAL MEDICINE

## 2023-08-17 PROCEDURE — 1159F MED LIST DOCD IN RCRD: CPT | Mod: CPTII,S$GLB,, | Performed by: OPHTHALMOLOGY

## 2023-08-17 PROCEDURE — 3008F PR BODY MASS INDEX (BMI) DOCUMENTED: ICD-10-PCS | Mod: CPTII,S$GLB,, | Performed by: INTERNAL MEDICINE

## 2023-08-17 PROCEDURE — 99024 PR POST-OP FOLLOW-UP VISIT: ICD-10-PCS | Mod: S$GLB,,, | Performed by: OPHTHALMOLOGY

## 2023-08-17 PROCEDURE — 1101F PR PT FALLS ASSESS DOC 0-1 FALLS W/OUT INJ PAST YR: ICD-10-PCS | Mod: CPTII,S$GLB,, | Performed by: OPHTHALMOLOGY

## 2023-08-17 PROCEDURE — 1160F PR REVIEW ALL MEDS BY PRESCRIBER/CLIN PHARMACIST DOCUMENTED: ICD-10-PCS | Mod: CPTII,S$GLB,, | Performed by: INTERNAL MEDICINE

## 2023-08-17 PROCEDURE — 1126F PR PAIN SEVERITY QUANTIFIED, NO PAIN PRESENT: ICD-10-PCS | Mod: CPTII,S$GLB,, | Performed by: OPHTHALMOLOGY

## 2023-08-17 PROCEDURE — 1159F PR MEDICATION LIST DOCUMENTED IN MEDICAL RECORD: ICD-10-PCS | Mod: CPTII,S$GLB,, | Performed by: INTERNAL MEDICINE

## 2023-08-17 PROCEDURE — 1125F AMNT PAIN NOTED PAIN PRSNT: CPT | Mod: CPTII,S$GLB,, | Performed by: INTERNAL MEDICINE

## 2023-08-17 PROCEDURE — 1160F RVW MEDS BY RX/DR IN RCRD: CPT | Mod: CPTII,S$GLB,, | Performed by: INTERNAL MEDICINE

## 2023-08-17 PROCEDURE — 1125F PR PAIN SEVERITY QUANTIFIED, PAIN PRESENT: ICD-10-PCS | Mod: CPTII,S$GLB,, | Performed by: INTERNAL MEDICINE

## 2023-08-17 PROCEDURE — 1101F PR PT FALLS ASSESS DOC 0-1 FALLS W/OUT INJ PAST YR: ICD-10-PCS | Mod: CPTII,S$GLB,, | Performed by: INTERNAL MEDICINE

## 2023-08-17 PROCEDURE — 99999 PR PBB SHADOW E&M-EST. PATIENT-LVL V: ICD-10-PCS | Mod: PBBFAC,,, | Performed by: INTERNAL MEDICINE

## 2023-08-17 PROCEDURE — 99205 OFFICE O/P NEW HI 60 MIN: CPT | Mod: S$GLB,,, | Performed by: INTERNAL MEDICINE

## 2023-08-17 PROCEDURE — 1159F PR MEDICATION LIST DOCUMENTED IN MEDICAL RECORD: ICD-10-PCS | Mod: CPTII,S$GLB,, | Performed by: OPHTHALMOLOGY

## 2023-08-17 PROCEDURE — 99999 PR PBB SHADOW E&M-EST. PATIENT-LVL III: ICD-10-PCS | Mod: PBBFAC,,, | Performed by: OPHTHALMOLOGY

## 2023-08-17 PROCEDURE — 3078F DIAST BP <80 MM HG: CPT | Mod: CPTII,S$GLB,, | Performed by: INTERNAL MEDICINE

## 2023-08-17 PROCEDURE — 3288F FALL RISK ASSESSMENT DOCD: CPT | Mod: CPTII,S$GLB,, | Performed by: OPHTHALMOLOGY

## 2023-08-17 PROCEDURE — 1159F MED LIST DOCD IN RCRD: CPT | Mod: CPTII,S$GLB,, | Performed by: INTERNAL MEDICINE

## 2023-08-17 PROCEDURE — 3074F PR MOST RECENT SYSTOLIC BLOOD PRESSURE < 130 MM HG: ICD-10-PCS | Mod: CPTII,S$GLB,, | Performed by: INTERNAL MEDICINE

## 2023-08-17 PROCEDURE — 1101F PT FALLS ASSESS-DOCD LE1/YR: CPT | Mod: CPTII,S$GLB,, | Performed by: INTERNAL MEDICINE

## 2023-08-17 PROCEDURE — 1126F AMNT PAIN NOTED NONE PRSNT: CPT | Mod: CPTII,S$GLB,, | Performed by: OPHTHALMOLOGY

## 2023-08-17 PROCEDURE — 3288F FALL RISK ASSESSMENT DOCD: CPT | Mod: CPTII,S$GLB,, | Performed by: INTERNAL MEDICINE

## 2023-08-17 PROCEDURE — 3288F PR FALLS RISK ASSESSMENT DOCUMENTED: ICD-10-PCS | Mod: CPTII,S$GLB,, | Performed by: OPHTHALMOLOGY

## 2023-08-17 PROCEDURE — 99999 PR PBB SHADOW E&M-EST. PATIENT-LVL V: CPT | Mod: PBBFAC,,, | Performed by: INTERNAL MEDICINE

## 2023-08-17 PROCEDURE — 1101F PT FALLS ASSESS-DOCD LE1/YR: CPT | Mod: CPTII,S$GLB,, | Performed by: OPHTHALMOLOGY

## 2023-08-17 PROCEDURE — 99024 POSTOP FOLLOW-UP VISIT: CPT | Mod: S$GLB,,, | Performed by: OPHTHALMOLOGY

## 2023-08-17 PROCEDURE — 3288F PR FALLS RISK ASSESSMENT DOCUMENTED: ICD-10-PCS | Mod: CPTII,S$GLB,, | Performed by: INTERNAL MEDICINE

## 2023-08-17 PROCEDURE — 99999 PR PBB SHADOW E&M-EST. PATIENT-LVL III: CPT | Mod: PBBFAC,,, | Performed by: OPHTHALMOLOGY

## 2023-08-17 NOTE — PROGRESS NOTES
HPI     - neuropsychology    S/p: Phacoemulsification with placement of intraocular lens, left eye.   08/16/2023 - near  S/p phaco with IOL OD 6/1/2023 - near  High Myopia   S/p lower lid bleph OU  NSC OS  Corneal Abnormality OS     GTTS:  AT's PRN OU  Systane gel qhs OU  Combo TID OS    Pt is here today for 1 day post op OS. Pt. States near vision is fine but   distance is still blurry. Pt. States OS is tearing. Pt. Denies pain or   discomfort.    Last edited by Nessa Romo MD on 8/17/2023  4:27 PM.            Assessment /Plan     For exam results, see Encounter Report.    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  Patient instructed to call or come in if symptoms of redness, decreased vision, or pain are experienced.      NEAR TARGET OU    -f/up   4 wks with optom for nadir Roth OU   - lubricate, observe for now.

## 2023-08-17 NOTE — PROGRESS NOTES
HEMATOLOGIC MALIGNANCIES CONSULT NOTE    IDENTIFYING STATEMENT   Thiago Thacker (Thiago) is a 73 y.o. male with a  of 1950 from [place], [place], referred by Dr. Reyes for evaluation of lymphopenia and thrombocytopenia.     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     CBC findings are summarized as follows:   - Platelets normal on 2022  - Plt 103 on 7/10/2023 with ALC ~700    He takes sirolimus daily. He initially was participating in a placebo-controlled trial to investigate this agent in prevention of aging. He discovered he was in the placebo arm, and he has since obtained a prescription for this from an online doctor. He denies any adverse effects.     He is a neuropsychologist. He reports he seldom consumes alcohol.     Past Medical History:   Diagnosis Date    Coronary atherosclerosis     Headache     Hypercholesterolemia     Myocardial infarction        Family History   Problem Relation Age of Onset    Colon cancer Mother 80    Heart attack Father 90       Social History     Socioeconomic History    Marital status: Single   Tobacco Use    Smoking status: Never    Smokeless tobacco: Never   Substance and Sexual Activity    Alcohol use: Yes     Comment: occasionally    Drug use: Never    Sexual activity: Not Currently     Partners: Female     Social Determinants of Health     Financial Resource Strain: Low Risk  (2023)    Overall Financial Resource Strain (CARDIA)     Difficulty of Paying Living Expenses: Not hard at all   Food Insecurity: No Food Insecurity (2023)    Hunger Vital Sign     Worried About Running Out of Food in the Last Year: Never true     Ran Out of Food in the Last Year: Never true   Transportation Needs: No Transportation Needs (2023)    PRAPARE - Transportation     Lack of Transportation (Medical): No     Lack of Transportation (Non-Medical):  No   Physical Activity: Sufficiently Active (8/4/2023)    Exercise Vital Sign     Days of Exercise per Week: 7 days     Minutes of Exercise per Session: 40 min   Social Connections: Moderately Isolated (8/4/2023)    Social Connection and Isolation Panel [NHANES]     Frequency of Communication with Friends and Family: More than three times a week     Frequency of Social Gatherings with Friends and Family: Once a week     Attends Oriental orthodox Services: Never     Active Member of Clubs or Organizations: Yes     Attends Club or Organization Meetings: Never     Marital Status: Never    Housing Stability: Unknown (8/4/2023)    Housing Stability Vital Sign     Unable to Pay for Housing in the Last Year: No     Unstable Housing in the Last Year: No         MEDICATIONS:     Current Outpatient Medications on File Prior to Visit   Medication Sig Dispense Refill    acarbose (PRECOSE) 25 MG Tab       alpha lipoic acid 200 mg Tab       aspirin (ECOTRIN) 81 MG EC tablet Take 81 mg by mouth once daily.      atorvastatin (LIPITOR) 40 MG tablet Take 1 tablet (40 mg total) by mouth once daily. 90 tablet 1    erythromycin with ethanoL (EMGEL) 2 % gel Apply topically 2 (two) times daily. Prn breakouts of posterior neck 30 g 3    finasteride (PROSCAR) 5 mg tablet Take 1 tablet (5 mg total) by mouth once daily. 90 tablet 1    GREEN TEA EXTRACT ORAL Take 1 capsule by mouth once daily.      levothyroxine (SYNTHROID, LEVOTHROID) 175 MCG tablet Take 1 tablet (175 mcg total) by mouth before breakfast. 90 tablet 1    multivitamin (THERAGRAN) per tablet Take 1 tablet by mouth once daily.      omega-3/dha/epa/fish oil (OMEGA-3 FISH OIL ORAL) Take 4 g by mouth once daily.      prednisolon/gatiflox/bromfenac (PREDNISOL ACE-GATIFLOX-BROMFEN) 1-0.5-0.075 % DrpS Apply 1 drop to eye 3 (three) times daily. 5 mL 3    QUERCETIN DIHYDRATE, BULK, MISC 1,000 mg by Misc.(Non-Drug; Combo Route) route once daily. Quercetin      RESVERATROL ORAL Take 200 mg  by mouth. Resveratrol (Longevinex formulation)      sirolimus (RAPAMUNE) 1 MG Tab Take 10 mg by mouth once a week. Sat      tadalafiL (CIALIS) 5 MG tablet Take 1 tablet (5 mg total) by mouth daily as needed for Erectile Dysfunction. 90 tablet 0    taurine 1,000 mg Cap Take 6 g by mouth once daily.      triamcinolone acetonide 0.1% (KENALOG) 0.1 % ointment Apply topically 2 (two) times daily. 15 g 0    turmeric (CURCUMIN MISC) by Misc.(Non-Drug; Combo Route) route.       Current Facility-Administered Medications on File Prior to Visit   Medication Dose Route Frequency Provider Last Rate Last Admin    balanced salt irrigation intra-ocular solution 1 drop  1 drop Right Eye On Call Procedure Nessa Romo MD        balanced salt irrigation intra-ocular solution 1 drop  1 drop Left Eye On Call Procedure Nessa Romo MD        phenylephrine HCL 10% ophthalmic solution 1 drop  1 drop Right Eye PRN Nessa Romo MD        phenylephrine HCL 10% ophthalmic solution 1 drop  1 drop Left Eye PRNessa Muse MD        proparacaine 0.5 % ophthalmic solution 1 drop  1 drop Left Eye PRNessa Muse MD        sodium chloride 0.9% flush 2 mL  2 mL Intravenous PRN Nessa Romo MD        sodium chloride 0.9% flush 2 mL  2 mL Intravenous PRN Nessa Romo MD        TETRAcaine HCl (PF) 0.5 % Drop 1 drop  1 drop Left Eye PRN Nessa Romo MD           ALLERGIES:   Review of patient's allergies indicates:   Allergen Reactions    Celebrex [celecoxib] Rash        ROS:       Review of Systems   Constitutional:  Negative for appetite change and unexpected weight change.   HENT:   Negative for mouth sores.    Respiratory:  Positive for cough. Negative for shortness of breath.    Cardiovascular:  Negative for chest pain.   Gastrointestinal:  Positive for abdominal pain. Negative for diarrhea.   Genitourinary:  Positive for frequency.    Musculoskeletal:  Positive for back pain.   Skin:  Negative for  "rash.   Neurological:  Negative for headaches.   Hematological:  Negative for adenopathy.   Psychiatric/Behavioral:  The patient is not nervous/anxious.        PHYSICAL EXAM:  Vitals:    08/17/23 0841   BP: 124/69   Pulse: 61   Resp: 16   Temp: 98.4 °F (36.9 °C)   TempSrc: Oral   SpO2: 95%   Weight: 84.8 kg (187 lb 1 oz)   Height: 5' 11" (1.803 m)   PainSc:   2   PainLoc: Back       Physical Exam  Constitutional:       General: He is not in acute distress.     Appearance: He is well-developed.   HENT:      Head: Normocephalic and atraumatic.      Mouth/Throat:      Mouth: No oral lesions.   Eyes:      Conjunctiva/sclera: Conjunctivae normal.   Neck:      Thyroid: No thyromegaly.   Cardiovascular:      Rate and Rhythm: Normal rate and regular rhythm.      Heart sounds: Normal heart sounds. No murmur heard.  Pulmonary:      Breath sounds: Rales (R base) present. No wheezing.   Abdominal:      General: There is no distension.      Palpations: Abdomen is soft. There is no hepatomegaly, splenomegaly or mass.      Tenderness: There is no abdominal tenderness.   Lymphadenopathy:      Cervical: No cervical adenopathy.      Right cervical: No deep cervical adenopathy.     Left cervical: No deep cervical adenopathy.   Skin:     Findings: No rash.   Neurological:      Mental Status: He is alert and oriented to person, place, and time.      Cranial Nerves: No cranial nerve deficit.      Coordination: Coordination normal.      Deep Tendon Reflexes: Reflexes are normal and symmetric.         LAB:   Results for orders placed or performed in visit on 07/10/23   TSH   Result Value Ref Range    TSH 2.808 0.400 - 4.000 uIU/mL   Comprehensive Metabolic Panel   Result Value Ref Range    Sodium 141 136 - 145 mmol/L    Potassium 4.2 3.5 - 5.1 mmol/L    Chloride 104 95 - 110 mmol/L    CO2 25 23 - 29 mmol/L    Glucose 90 70 - 110 mg/dL    BUN 19 8 - 23 mg/dL    Creatinine 1.1 0.5 - 1.4 mg/dL    Calcium 9.1 8.7 - 10.5 mg/dL    Total Protein " 6.8 6.0 - 8.4 g/dL    Albumin 3.9 3.5 - 5.2 g/dL    Total Bilirubin 0.7 0.1 - 1.0 mg/dL    Alkaline Phosphatase 81 55 - 135 U/L    AST 34 10 - 40 U/L    ALT 27 10 - 44 U/L    eGFR >60.0 >60 mL/min/1.73 m^2    Anion Gap 12 8 - 16 mmol/L   Lipid Panel   Result Value Ref Range    Cholesterol 135 120 - 199 mg/dL    Triglycerides 63 30 - 150 mg/dL    HDL 46 40 - 75 mg/dL    LDL Cholesterol 76.4 63.0 - 159.0 mg/dL    HDL/Cholesterol Ratio 34.1 20.0 - 50.0 %    Total Cholesterol/HDL Ratio 2.9 2.0 - 5.0    Non-HDL Cholesterol 89 mg/dL   CBC Auto Differential   Result Value Ref Range    WBC 3.04 (L) 3.90 - 12.70 K/uL    RBC 4.68 4.60 - 6.20 M/uL    Hemoglobin 14.4 14.0 - 18.0 g/dL    Hematocrit 43.8 40.0 - 54.0 %    MCV 94 82 - 98 fL    MCH 30.8 27.0 - 31.0 pg    MCHC 32.9 32.0 - 36.0 g/dL    RDW 12.2 11.5 - 14.5 %    Platelets 103 (L) 150 - 450 K/uL    MPV 11.8 9.2 - 12.9 fL    Immature Granulocytes 0.0 0.0 - 0.5 %    Gran # (ANC) 1.9 1.8 - 7.7 K/uL    Immature Grans (Abs) 0.00 0.00 - 0.04 K/uL    Lymph # 0.7 (L) 1.0 - 4.8 K/uL    Mono # 0.4 0.3 - 1.0 K/uL    Eos # 0.0 0.0 - 0.5 K/uL    Baso # 0.02 0.00 - 0.20 K/uL    nRBC 0 0 /100 WBC    Gran % 63.8 38.0 - 73.0 %    Lymph % 23.0 18.0 - 48.0 %    Mono % 11.5 4.0 - 15.0 %    Eosinophil % 1.0 0.0 - 8.0 %    Basophil % 0.7 0.0 - 1.9 %    Differential Method Automated        PROBLEMS ASSESSED THIS VISIT:    1. Thrombocytopenia    2. 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 Lymphopenia        IMPRESSION:    1. Thrombocytopenia    2. Coronary artery disease  3. History of MI  4. Benign prostatic hyperplasia  5. Hypothyroidism    PLAN:       Thrombocytopenia  Lymphopenia  Most likely related to sirolimus. These findings are persistent on repeat labs today. Pathologic review of the smear is unremarkable. CMV and EBV tested due to immune suppressed status and negative as well. We will obtain abdominal ultrasound to evaluate for hepatosplenomegaly.     Reviewed  that cytopenias in his case are mild and unlikely to cause significant qualitative dysfunction (such as increased bleeding). We reviewed the risk of using an immune suppressive agent with unknown or undemonstrated clinical benefit. He demonstrated good understanding.     Cough  Obtained chest x-ray, which was normal. I personally reviewed and interpreted images.    Follow-up  Pending ultrasound  Likely return to primary care with follow-up in hematology only as needed     Uziel Han MD  Hematology and Stem Cell Transplant    I personally spent over an hour on the day of this encounter in direct face-to-face time with the patient, review of records, coordinating care, in result interpretation, and in documentation.

## 2023-08-18 ENCOUNTER — TELEPHONE (OUTPATIENT)
Dept: SURGERY | Facility: CLINIC | Age: 73
End: 2023-08-18
Payer: COMMERCIAL

## 2023-08-18 NOTE — TELEPHONE ENCOUNTER
----- Message from Jose Alberto Serrano sent at 8/18/2023 10:20 AM CDT -----  Regarding: adv  Contact: @[phone]  Pt calling requesting to speak with dion no reason provided ... Pls call and [phone]        I called and spoke to the Patient.  He was calling for his arrival time.  I gave him his arrival time of 11:15 to check in at the Surgery Center on the 2nd Floor of the Hospital on [facility].  Stated that he is using a Transportation Service to bring him and pick him up from surgery.  Directions given to the Surgery Center from the Front Entrance of the Hospital.   Hospital address verified.    I told him that Dion wanted me to check with him to see about holding his weekly Sirollimus.  I instructed him not to take it tomorrow.  He said that he is not taking it before surgery.  Stated that he has been holding his Aspirin.  He has received his Pre-Op Anesthesia phone call.     I explained that we will schedule an ~ 2 week follow up appointment for him to see Dr. Sy.  He did not have any other questions at present.

## 2023-08-20 ENCOUNTER — ANESTHESIA EVENT (OUTPATIENT)
Dept: SURGERY | Facility: HOSPITAL | Age: 73
End: 2023-08-20
Payer: COMMERCIAL

## 2023-08-20 NOTE — ANESTHESIA PREPROCEDURE EVALUATION
[facility]  Anesthesia Pre-Operative Evaluation         Patient Name: Thiago Thacker  YOB: 1950  MRN: [id number]    SUBJECTIVE:     Pre-operative evaluation for Procedure(s) (LRB):  XI ROBOTIC REPAIR, HERNIA, INGUINAL, RIGHT (recurrent) w/ mesh (N/A)     08/20/2023    Thiago Thacker is a 73 y.o. male w/ a significant PMHx of CAD, MI, HTN, BPH, osteoarthritis who presents to clinic for evaluation of right recurrent inguinal hernia, last repair open in 2018.    Patient now presents for the above procedure(s).    TTE 1/26/22:   The left ventricle is normal in size with normal systolic function.   The estimated ejection fraction is 65%.   Grade II left ventricular diastolic dysfunction.   Normal right ventricular size with normal right ventricular systolic function.   Moderate aortic valve sclerosis.   Mild mitral regurgitation.   Mild tricuspid regurgitation.   The estimated PA systolic pressure is 20 mmHg.   Normal central venous pressure (3 mmHg).    LDA: None documented.     Prev airway: None documented.      Patient Active Problem List   Diagnosis    BPH (benign prostatic hyperplasia)    Hypercholesterolemia    Hypothyroid    Muscle weakness of lower extremity    Decreased range of motion (ROM) of right knee    Coronary atherosclerosis    Hx of myocardial infarction    Thrombocytopenia    Osteoarthritis of right knee    Cervical myofascial pain syndrome    Rash of neck       Review of patient's allergies indicates:   Allergen Reactions    Celebrex [celecoxib] Rash       Current Inpatient Medications:      Current Facility-Administered Medications on File Prior to Encounter   Medication Dose Route Frequency Provider Last Rate Last Admin    balanced salt irrigation intra-ocular solution 1 drop  1 drop Right Eye On Call Procedure Nessa Romo MD        phenylephrine HCL 10% ophthalmic solution 1 drop  1 drop Right Eye PRN Nessa Romo MD         sodium chloride 0.9% flush 2 mL  2 mL Intravenous PRN Nessa Romo MD         Current Outpatient Medications on File Prior to Encounter   Medication Sig Dispense Refill    acarbose (PRECOSE) 25 MG Tab       alpha lipoic acid 200 mg Tab       aspirin (ECOTRIN) 81 MG EC tablet Take 81 mg by mouth once daily.      atorvastatin (LIPITOR) 40 MG tablet Take 1 tablet (40 mg total) by mouth once daily. 90 tablet 1    erythromycin with ethanoL (EMGEL) 2 % gel Apply topically 2 (two) times daily. Prn breakouts of posterior neck 30 g 3    finasteride (PROSCAR) 5 mg tablet Take 1 tablet (5 mg total) by mouth once daily. 90 tablet 1    GREEN TEA EXTRACT ORAL Take 1 capsule by mouth once daily.      levothyroxine (SYNTHROID, LEVOTHROID) 175 MCG tablet Take 1 tablet (175 mcg total) by mouth before breakfast. 90 tablet 1    multivitamin (THERAGRAN) per tablet Take 1 tablet by mouth once daily.      omega-3/dha/epa/fish oil (OMEGA-3 FISH OIL ORAL) Take 4 g by mouth once daily.      prednisolon/gatiflox/bromfenac (PREDNISOL ACE-GATIFLOX-BROMFEN) 1-0.5-0.075 % DrpS Apply 1 drop to eye 3 (three) times daily. 5 mL 3    QUERCETIN DIHYDRATE, BULK, MISC 1,000 mg by Misc.(Non-Drug; Combo Route) route once daily. Quercetin      RESVERATROL ORAL Take 200 mg by mouth. Resveratrol (Longevinex formulation)      sirolimus (RAPAMUNE) 1 MG Tab Take 10 mg by mouth once a week. Sat      tadalafiL (CIALIS) 5 MG tablet Take 1 tablet (5 mg total) by mouth daily as needed for Erectile Dysfunction. 90 tablet 0    taurine 1,000 mg Cap Take 6 g by mouth once daily.      triamcinolone acetonide 0.1% (KENALOG) 0.1 % ointment Apply topically 2 (two) times daily. 15 g 0    turmeric (CURCUMIN MISC) by Misc.(Non-Drug; Combo Route) route.         Past Surgical History:   Procedure Laterality Date    BLEPHAROPLASTY      CATARACT EXTRACTION W/  INTRAOCULAR LENS IMPLANT Right 6/1/2023    Procedure: EXTRACTION, CATARACT, WITH IOL INSERTION;   Surgeon: Nessa Romo MD;  Location: [facility] OR;  Service: Ophthalmology;  Laterality: Right;    CATARACT EXTRACTION W/  INTRAOCULAR LENS IMPLANT Left 8/16/2023    Procedure: EXTRACTION, CATARACT, WITH IOL INSERTION;  Surgeon: Nessa Romo MD;  Location: [facility] OR;  Service: Ophthalmology;  Laterality: Left;    COMPLETE LASER PHOTOVAPORIZATION OF PROSTATE      CORONARY STENT PLACEMENT  2011    INGUINAL HERNIA REPAIR      vein         Social History     Socioeconomic History    Marital status: Single   Tobacco Use    Smoking status: Never    Smokeless tobacco: Never   Substance and Sexual Activity    Alcohol use: Yes     Comment: occasionally    Drug use: Never    Sexual activity: Not Currently     Partners: Female     Social Determinants of Health     Financial Resource Strain: Low Risk  (8/4/2023)    Overall Financial Resource Strain (CARDIA)     Difficulty of Paying Living Expenses: Not hard at all   Food Insecurity: No Food Insecurity (8/4/2023)    Hunger Vital Sign     Worried About Running Out of Food in the Last Year: Never true     Ran Out of Food in the Last Year: Never true   Transportation Needs: No Transportation Needs (8/4/2023)    PRAPARE - Transportation     Lack of Transportation (Medical): No     Lack of Transportation (Non-Medical): No   Physical Activity: Sufficiently Active (8/4/2023)    Exercise Vital Sign     Days of Exercise per Week: 7 days     Minutes of Exercise per Session: 40 min   Social Connections: Moderately Isolated (8/4/2023)    Social Connection and Isolation Panel [NHANES]     Frequency of Communication with Friends and Family: More than three times a week     Frequency of Social Gatherings with Friends and Family: Once a week     Attends Alevism Services: Never     Active Member of Clubs or Organizations: Yes     Attends Club or Organization Meetings: Never     Marital Status: Never    Housing Stability: Unknown (8/4/2023)    Housing  Stability Vital Sign     Unable to Pay for Housing in the Last Year: No     Unstable Housing in the Last Year: No       OBJECTIVE:     Vital Signs Range (Last 24H):         Significant Labs:  Lab Results   Component Value Date    WBC 3.68 (L) 08/17/2023    WBC 3.76 (L) 08/17/2023    HGB 14.0 08/17/2023    HGB 13.8 (L) 08/17/2023    HCT 41.2 08/17/2023    HCT 41.0 08/17/2023     (L) 08/17/2023     (L) 08/17/2023    CHOL 135 07/10/2023    TRIG 63 07/10/2023    HDL 46 07/10/2023    ALT 27 08/17/2023    AST 28 08/17/2023     08/17/2023    K 4.3 08/17/2023     08/17/2023    CREATININE 0.9 08/17/2023    BUN 14 08/17/2023    CO2 27 08/17/2023    TSH 2.808 07/10/2023    PSA 0.64 01/25/2022       Diagnostic Studies: No relevant studies.    EKG:   Results for orders placed or performed in visit on 04/22/22   IN OFFICE EKG 12-LEAD (to [place])    Collection Time: 04/22/22 10:54 AM    Narrative    Test Reason : I25.10,    Vent. Rate : 056 BPM     Atrial Rate : 056 BPM     P-R Int : 190 ms          QRS Dur : 098 ms      QT Int : 402 ms       P-R-T Axes : 062 065 061 degrees     QTc Int : 387 ms    Sinus bradycardia  Minimal voltage criteria for LVH, may be normal variant  No previous ECGs available  Confirmed by Todd Montenegro MD ([id number]) on 4/29/2022 2:47:24 PM    Referred By: SELINA ROTH           Confirmed By:Todd Montenegro MD       2D ECHO:  TTE:  Results for orders placed or performed during the hospital encounter of 03/08/22   Echo   Result Value Ref Range    Ascending aorta 3.25 cm    STJ 3.19 cm    AV mean gradient 8 mmHg    Ao peak luis alberto 1.86 m/s    Ao VTI 41.85 cm    IVRT 121.79 msec    IVS 0.98 0.6 - 1.1 cm    LA size 3.60 cm    Left Atrium Major Axis 5.07 cm    Left Atrium Minor Axis 5.41 cm    LVIDd 5.08 3.5 - 6.0 cm    LVIDs 3.13 2.1 - 4.0 cm    LVOT diameter 2.26 cm    LVOT peak VTI 30.76 cm    Posterior Wall 0.94 0.6 - 1.1 cm    MV Peak A Luis Alberto 0.55 m/s    E wave deceleration time 331.44 msec     MV Peak E Ron 0.67 m/s    RA Major Axis 5.73 cm    RA Width 2.90 cm    RVDD 3.87 cm    Sinus 3.75 cm    TAPSE 2.04 cm    TR Max Ron 2.04 m/s    LA WIDTH 3.60 cm    PV PEAK VELOCITY 1.22 cm/s    LV Diastolic Volume 122.70 mL    LV Systolic Volume 38.77 mL    LVOT peak ron 1.50 m/s    TDI LATERAL 0.12 m/s    TDI SEPTAL 0.07 m/s    Mr max ron 0.05 m/s    LV LATERAL E/E' RATIO 5.58 m/s    LV SEPTAL E/E' RATIO 9.57 m/s    FS 38 %    LA volume 57.66 cm3    LV mass 176.90 g    Left Ventricle Relative Wall Thickness 0.37 cm    AV valve area 2.95 cm2    AV Velocity Ratio 0.81     AV index (prosthetic) 0.74     E/A ratio 1.22     Mean e' 0.10 m/s    LVOT area 4.0 cm2    LVOT stroke volume 123.33 cm3    AV peak gradient 14 mmHg    E/E' ratio 7.05 m/s    Triscuspid Valve Regurgitation Peak Gradient 17 mmHg    BSA 2.07 m2    LV Systolic Volume Index 18.8 mL/m2    LV Diastolic Volume Index 59.56 mL/m2    LA Volume Index 28.0 mL/m2    LV Mass Index 86 g/m2    LA Volume Index (Mod) 23.8 mL/m2    LA volume (mod) 49.00 cm3    Right Atrial Pressure (from IVC) 3 mmHg    EF 65 %    TV resting pulmonary artery pressure 20 mmHg    Narrative    · The left ventricle is normal in size with normal systolic function.  · The estimated ejection fraction is 65%.  · Grade II left ventricular diastolic dysfunction.  · Normal right ventricular size with normal right ventricular systolic   function.  · Moderate aortic valve sclerosis.  · Mild mitral regurgitation.  · Mild tricuspid regurgitation.  · The estimated PA systolic pressure is 20 mmHg.  · Normal central venous pressure (3 mmHg).          GLENNY:  No results found for this or any previous visit.    ASSESSMENT/PLAN:         Pre-op Assessment    I have reviewed the Patient Summary Reports.     I have reviewed the Nursing Notes. I have reviewed the NPO Status.   I have reviewed the Medications.     Review of Systems  Anesthesia Hx:  No problems with previous Anesthesia  History of prior surgery of  interest to airway management or planning: Denies Family Hx of Anesthesia complications.   Denies Personal Hx of Anesthesia complications.   Social:  Non-Smoker, No Alcohol Use    Hematology/Oncology:        Denies Current/Recent Cancer   EENT/Dental:   denies chronic allergic rhinitis   Cardiovascular:   Hypertension CAD   no hyperlipidemia    Pulmonary:   Denies COPD.  Denies Asthma.  Denies Sleep Apnea.    Renal/:   Denies Chronic Renal Disease.     Hepatic/GI:   Denies GERD.    Musculoskeletal:   Arthritis     Neurological:   Denies CVA. Denies Seizures.    Psych:   Denies Psychiatric History.          Physical Exam  General: Well nourished, Cooperative, Alert and Oriented    Airway:  Mallampati: II   Mouth Opening: Normal  TM Distance: Normal  Tongue: Normal  Neck ROM: Normal ROM    Dental:  Intact        Anesthesia Plan  Type of Anesthesia, risks & benefits discussed:    Anesthesia Type: Gen ETT  Intra-op Monitoring Plan: Standard ASA Monitors  Post Op Pain Control Plan: multimodal analgesia  Induction:  IV  Airway Plan: Direct, Post-Induction  Informed Consent: Informed consent signed with the Patient and all parties understand the risks and agree with anesthesia plan.  All questions answered.   ASA Score: 3  Day of Surgery Review of History & Physical: H&P Update referred to the surgeon/provider.    Ready For Surgery From Anesthesia Perspective.     .

## 2023-08-21 ENCOUNTER — HOSPITAL ENCOUNTER (OUTPATIENT)
Facility: HOSPITAL | Age: 73
Discharge: HOME | End: 2023-08-21
Attending: SURGERY | Admitting: SURGERY
Payer: COMMERCIAL

## 2023-08-21 ENCOUNTER — ANESTHESIA (OUTPATIENT)
Dept: SURGERY | Facility: HOSPITAL | Age: 73
End: 2023-08-21
Payer: COMMERCIAL

## 2023-08-21 VITALS
HEIGHT: 71 IN | DIASTOLIC BLOOD PRESSURE: 64 MMHG | BODY MASS INDEX: 26.17 KG/M2 | RESPIRATION RATE: 18 BRPM | OXYGEN SATURATION: 97 % | WEIGHT: 186.94 LBS | SYSTOLIC BLOOD PRESSURE: 138 MMHG | TEMPERATURE: 98 F | HEART RATE: 70 BPM

## 2023-08-21 DIAGNOSIS — K40.91 RECURRENT RIGHT INGUINAL HERNIA: ICD-10-CM

## 2023-08-21 PROCEDURE — 63600175 PHARM REV CODE 636 W HCPCS: Performed by: SURGERY

## 2023-08-21 PROCEDURE — 37000008 HC ANESTHESIA 1ST 15 MINUTES: Performed by: SURGERY

## 2023-08-21 PROCEDURE — 37000009 HC ANESTHESIA EA ADD 15 MINS: Performed by: SURGERY

## 2023-08-21 PROCEDURE — 36000710: Performed by: SURGERY

## 2023-08-21 PROCEDURE — 49651 PR LAP,INGUINAL HERNIA REPR,RECUR: ICD-10-PCS | Mod: LT,,, | Performed by: SURGERY

## 2023-08-21 PROCEDURE — D9220A PRA ANESTHESIA: ICD-10-PCS | Mod: CRNA,,, | Performed by: NURSE ANESTHETIST, CERTIFIED REGISTERED

## 2023-08-21 PROCEDURE — D9220A PRA ANESTHESIA: Mod: ANES,,, | Performed by: ANESTHESIOLOGY

## 2023-08-21 PROCEDURE — 63600175 PHARM REV CODE 636 W HCPCS

## 2023-08-21 PROCEDURE — 71000044 HC DOSC ROUTINE RECOVERY FIRST HOUR: Performed by: SURGERY

## 2023-08-21 PROCEDURE — 71000015 HC POSTOP RECOV 1ST HR: Performed by: SURGERY

## 2023-08-21 PROCEDURE — 71000016 HC POSTOP RECOV ADDL HR: Performed by: SURGERY

## 2023-08-21 PROCEDURE — 63600175 PHARM REV CODE 636 W HCPCS: Performed by: NURSE ANESTHETIST, CERTIFIED REGISTERED

## 2023-08-21 PROCEDURE — 25000003 PHARM REV CODE 250

## 2023-08-21 PROCEDURE — C1781 MESH (IMPLANTABLE): HCPCS | Performed by: SURGERY

## 2023-08-21 PROCEDURE — 25000003 PHARM REV CODE 250: Performed by: SURGERY

## 2023-08-21 PROCEDURE — 25000003 PHARM REV CODE 250: Performed by: NURSE ANESTHETIST, CERTIFIED REGISTERED

## 2023-08-21 PROCEDURE — 49651 LAP ING HERNIA REPAIR RECUR: CPT | Mod: LT,,, | Performed by: SURGERY

## 2023-08-21 PROCEDURE — D9220A PRA ANESTHESIA: ICD-10-PCS | Mod: ANES,,, | Performed by: ANESTHESIOLOGY

## 2023-08-21 PROCEDURE — D9220A PRA ANESTHESIA: Mod: CRNA,,, | Performed by: NURSE ANESTHETIST, CERTIFIED REGISTERED

## 2023-08-21 PROCEDURE — 36000711: Performed by: SURGERY

## 2023-08-21 DEVICE — MESH 3DMAX ANAT LG RIGHT 4X6IN: Type: IMPLANTABLE DEVICE | Site: ABDOMEN | Status: FUNCTIONAL

## 2023-08-21 RX ORDER — SODIUM CHLORIDE 0.9 % (FLUSH) 0.9 %
10 SYRINGE (ML) INJECTION
Status: DISCONTINUED | OUTPATIENT
Start: 2023-08-21 | End: 2023-08-21 | Stop reason: HOSPADM

## 2023-08-21 RX ORDER — MIDAZOLAM HYDROCHLORIDE 1 MG/ML
INJECTION INTRAMUSCULAR; INTRAVENOUS
Status: DISCONTINUED | OUTPATIENT
Start: 2023-08-21 | End: 2023-08-21

## 2023-08-21 RX ORDER — ROCURONIUM BROMIDE 10 MG/ML
INJECTION, SOLUTION INTRAVENOUS
Status: DISCONTINUED | OUTPATIENT
Start: 2023-08-21 | End: 2023-08-21

## 2023-08-21 RX ORDER — LIDOCAINE HYDROCHLORIDE 20 MG/ML
INJECTION, SOLUTION EPIDURAL; INFILTRATION; INTRACAUDAL; PERINEURAL
Status: DISCONTINUED | OUTPATIENT
Start: 2023-08-21 | End: 2023-08-21

## 2023-08-21 RX ORDER — PHENYLEPHRINE HYDROCHLORIDE 10 MG/ML
INJECTION INTRAVENOUS
Status: DISCONTINUED | OUTPATIENT
Start: 2023-08-21 | End: 2023-08-21

## 2023-08-21 RX ORDER — OXYCODONE HYDROCHLORIDE 5 MG/1
5 TABLET ORAL EVERY 4 HOURS PRN
Qty: 8 TABLET | Refills: 0 | Status: SHIPPED | OUTPATIENT
Start: 2023-08-21 | End: 2023-11-15

## 2023-08-21 RX ORDER — FENTANYL CITRATE 50 UG/ML
INJECTION, SOLUTION INTRAMUSCULAR; INTRAVENOUS
Status: DISCONTINUED | OUTPATIENT
Start: 2023-08-21 | End: 2023-08-21

## 2023-08-21 RX ORDER — IBUPROFEN 400 MG/1
400 TABLET ORAL EVERY 6 HOURS PRN
COMMUNITY
Start: 2023-08-21 | End: 2023-11-15

## 2023-08-21 RX ORDER — HALOPERIDOL 5 MG/ML
0.5 INJECTION INTRAMUSCULAR EVERY 10 MIN PRN
Status: DISCONTINUED | OUTPATIENT
Start: 2023-08-21 | End: 2023-08-21 | Stop reason: HOSPADM

## 2023-08-21 RX ORDER — BUPIVACAINE HYDROCHLORIDE 2.5 MG/ML
INJECTION, SOLUTION EPIDURAL; INFILTRATION; INTRACAUDAL
Status: DISCONTINUED | OUTPATIENT
Start: 2023-08-21 | End: 2023-08-21 | Stop reason: HOSPADM

## 2023-08-21 RX ORDER — ACETAMINOPHEN 500 MG
1000 TABLET ORAL
Status: DISCONTINUED | OUTPATIENT
Start: 2023-08-21 | End: 2023-08-21 | Stop reason: HOSPADM

## 2023-08-21 RX ORDER — HYDROMORPHONE HYDROCHLORIDE 1 MG/ML
0.2 INJECTION, SOLUTION INTRAMUSCULAR; INTRAVENOUS; SUBCUTANEOUS EVERY 5 MIN PRN
Status: DISCONTINUED | OUTPATIENT
Start: 2023-08-21 | End: 2023-08-21 | Stop reason: HOSPADM

## 2023-08-21 RX ORDER — ONDANSETRON 2 MG/ML
INJECTION INTRAMUSCULAR; INTRAVENOUS
Status: DISCONTINUED | OUTPATIENT
Start: 2023-08-21 | End: 2023-08-21

## 2023-08-21 RX ORDER — PROPOFOL 10 MG/ML
VIAL (ML) INTRAVENOUS
Status: DISCONTINUED | OUTPATIENT
Start: 2023-08-21 | End: 2023-08-21

## 2023-08-21 RX ORDER — LIDOCAINE HYDROCHLORIDE AND EPINEPHRINE 10; 10 MG/ML; UG/ML
INJECTION, SOLUTION INFILTRATION; PERINEURAL
Status: DISCONTINUED | OUTPATIENT
Start: 2023-08-21 | End: 2023-08-21 | Stop reason: HOSPADM

## 2023-08-21 RX ORDER — CEFAZOLIN SODIUM 1 G/3ML
INJECTION, POWDER, FOR SOLUTION INTRAMUSCULAR; INTRAVENOUS
Status: DISCONTINUED | OUTPATIENT
Start: 2023-08-21 | End: 2023-08-21

## 2023-08-21 RX ORDER — ACETAMINOPHEN 10 MG/ML
INJECTION, SOLUTION INTRAVENOUS
Status: DISCONTINUED | OUTPATIENT
Start: 2023-08-21 | End: 2023-08-21

## 2023-08-21 RX ORDER — KETAMINE HCL IN 0.9 % NACL 50 MG/5 ML
SYRINGE (ML) INTRAVENOUS
Status: DISCONTINUED | OUTPATIENT
Start: 2023-08-21 | End: 2023-08-21

## 2023-08-21 RX ORDER — DEXAMETHASONE SODIUM PHOSPHATE 4 MG/ML
INJECTION, SOLUTION INTRA-ARTICULAR; INTRALESIONAL; INTRAMUSCULAR; INTRAVENOUS; SOFT TISSUE
Status: DISCONTINUED | OUTPATIENT
Start: 2023-08-21 | End: 2023-08-21

## 2023-08-21 RX ORDER — ACETAMINOPHEN 500 MG
500 TABLET ORAL EVERY 6 HOURS PRN
Refills: 0 | COMMUNITY
Start: 2023-08-21 | End: 2023-11-15

## 2023-08-21 RX ADMIN — PROPOFOL 60 MG: 10 INJECTION, EMULSION INTRAVENOUS at 01:08

## 2023-08-21 RX ADMIN — DEXAMETHASONE SODIUM PHOSPHATE 4 MG: 4 INJECTION, SOLUTION INTRAMUSCULAR; INTRAVENOUS at 01:08

## 2023-08-21 RX ADMIN — FENTANYL CITRATE 50 MCG: 50 INJECTION, SOLUTION INTRAMUSCULAR; INTRAVENOUS at 03:08

## 2023-08-21 RX ADMIN — ROCURONIUM BROMIDE 10 MG: 10 INJECTION, SOLUTION INTRAVENOUS at 03:08

## 2023-08-21 RX ADMIN — ROCURONIUM BROMIDE 50 MG: 10 INJECTION, SOLUTION INTRAVENOUS at 01:08

## 2023-08-21 RX ADMIN — Medication 30 MG: at 01:08

## 2023-08-21 RX ADMIN — LIDOCAINE HYDROCHLORIDE 100 MG: 20 INJECTION, SOLUTION EPIDURAL; INFILTRATION; INTRACAUDAL; PERINEURAL at 01:08

## 2023-08-21 RX ADMIN — ROCURONIUM BROMIDE 20 MG: 10 INJECTION, SOLUTION INTRAVENOUS at 02:08

## 2023-08-21 RX ADMIN — Medication 10 MG: at 02:08

## 2023-08-21 RX ADMIN — ACETAMINOPHEN 1000 MG: 10 INJECTION, SOLUTION INTRAVENOUS at 02:08

## 2023-08-21 RX ADMIN — PHENYLEPHRINE HYDROCHLORIDE 100 MCG: 10 INJECTION INTRAVENOUS at 02:08

## 2023-08-21 RX ADMIN — SODIUM CHLORIDE, SODIUM GLUCONATE, SODIUM ACETATE, POTASSIUM CHLORIDE, MAGNESIUM CHLORIDE, SODIUM PHOSPHATE, DIBASIC, AND POTASSIUM PHOSPHATE: .53; .5; .37; .037; .03; .012; .00082 INJECTION, SOLUTION INTRAVENOUS at 03:08

## 2023-08-21 RX ADMIN — SUGAMMADEX 200 MG: 100 INJECTION, SOLUTION INTRAVENOUS at 03:08

## 2023-08-21 RX ADMIN — SODIUM CHLORIDE: 0.9 INJECTION, SOLUTION INTRAVENOUS at 01:08

## 2023-08-21 RX ADMIN — PROPOFOL 140 MG: 10 INJECTION, EMULSION INTRAVENOUS at 01:08

## 2023-08-21 RX ADMIN — ONDANSETRON 4 MG: 2 INJECTION INTRAMUSCULAR; INTRAVENOUS at 03:08

## 2023-08-21 RX ADMIN — MIDAZOLAM HYDROCHLORIDE 2 MG: 1 INJECTION INTRAMUSCULAR; INTRAVENOUS at 01:08

## 2023-08-21 RX ADMIN — CEFAZOLIN 2 G: 330 INJECTION, POWDER, FOR SOLUTION INTRAMUSCULAR; INTRAVENOUS at 01:08

## 2023-08-21 NOTE — ANESTHESIA PROCEDURE NOTES
Intubation    Date/Time: 8/21/2023 1:53 PM    Performed by: Asha Hartley MD  Authorized by: Janneth Erwin MD    Intubation:     Induction:  Intravenous    Intubated:  Postinduction    Mask Ventilation:  Easy mask    Attempts:  1    Attempted By:  Student    Method of Intubation:  Video laryngoscopy    Blade:  [place] 3    Laryngeal View Grade: Grade I - full view of cords      Difficult Airway Encountered?: No      Complications:  None    Airway Device:  Oral endotracheal tube    Airway Device Size:  7.5    Style/Cuff Inflation:  Cuffed (inflated to minimal occlusive pressure)    Tube secured:  23    Secured at:  The lips    Placement Verified By:  Capnometry    Complicating Factors:  None    Findings Post-Intubation:  BS equal bilateral and atraumatic/condition of teeth unchanged

## 2023-08-21 NOTE — BRIEF OP NOTE
Champ Mcghee - Surgery ([facility])  Brief Operative Note    Surgery Date: 8/21/2023     Surgeon(s) and Role:     * Hussein Sy Jr., MD - Primary     * Griffin Sadler MD    Assisting Surgeon: None    Pre-op Diagnosis:  Recurrent right inguinal hernia [K40.91]    Post-op Diagnosis:  Post-Op Diagnosis Codes:     * Recurrent right inguinal hernia [K40.91]    Procedure(s) (LRB):  XI ROBOTIC REPAIR, HERNIA, INGUINAL, RIGHT (recurrent) w/ mesh (N/A)    Anesthesia: General    Operative Findings: Recurrent right indirect inguinal hernia. Repaired with mesh as planned.    Estimated Blood Loss: minimal         Specimens:   Specimen (24h ago, onward)      None              Discharge Note    OUTCOME: Patient tolerated treatment/procedure well without complication and is now ready for discharge.    DISPOSITION: Home or Self Care    FINAL DIAGNOSIS:  Recurrent right inguinal hernia  FOLLOWUP: In clinic    DISCHARGE INSTRUCTIONS:    Discharge Procedure Orders   Lifting restrictions   Order Comments: No lifting greater than 15 pounds for 4 weeks     Notify your health care provider if you experience any of the following:  temperature >100.4     Notify your health care provider if you experience any of the following:  persistent nausea and vomiting or diarrhea     Notify your health care provider if you experience any of the following:  severe uncontrolled pain     Notify your health care provider if you experience any of the following:  redness, tenderness, or signs of infection (pain, swelling, redness, odor or green/yellow discharge around incision site)     Notify your health care provider if you experience any of the following:  difficulty breathing or increased cough     Notify your health care provider if you experience any of the following:  severe persistent headache     Notify your health care provider if you experience any of the following:  worsening rash     Notify your health care provider if you experience any  of the following:  persistent dizziness, light-headedness, or visual disturbances     Notify your health care provider if you experience any of the following:  increased confusion or weakness     No dressing needed   Order Comments: Ok to shower. No bathing or soaking of the wound for 2 weeks. Steri strips will fall off on their own in 1-2 weeks.     Activity as tolerated

## 2023-08-21 NOTE — TRANSFER OF CARE
"Anesthesia Transfer of Care Note    Patient: Thiago Thacker    Procedure(s) Performed: Procedure(s) (LRB):  XI ROBOTIC REPAIR, HERNIA, INGUINAL, RIGHT (recurrent) w/ mesh (N/A)    Patient location: PACU    Anesthesia Type: general    Transport from OR: Transported from OR on 100% O2 by closed face mask with adequate spontaneous ventilation    Post pain: adequate analgesia    Post assessment: no apparent anesthetic complications and tolerated procedure well    Post vital signs: stable    Level of consciousness: awake, alert and oriented    Nausea/Vomiting: no nausea/vomiting    Complications: none    Transfer of care protocol was followed      Last vitals:   Visit Vitals  BP (!) 142/76   Pulse 70   Temp 36.9 °C (98.4 °F) (Temporal)   Resp 16   Ht 5' 11" (1.803 m)   Wt 84.8 kg (186 lb 15.2 oz)   SpO2 100%   BMI 26.07 kg/m²     "

## 2023-08-21 NOTE — OP NOTE
DATE OF PROCEDURE: 08/21/2023    SERVICE: General Surgery.     Surgeon(s) and Role:     * Hussein Sy Jr., MD - Primary     * Griffin Sadler MD.D.      PREOPERATIVE DIAGNOSIS: Recurrent Right Inguinal Hernia      POSTOPERATIVE DIAGNOSIS: Recurrent Right Inguinal Hernia      PROCEDURE: Robotic-assisted repair of recurrent right Inguinal Hernia      ANESTHESIA: General endotracheal and local.      DESCRIPTION OF PROCEDURE: The patient was taken to the Operating Room, placed    under general anesthesia and prepped and draped in sterile fashion. At this    time, a left upper quadrant 8mm incision was made after infiltrating    with local anesthetic. Intraabdominal    access was obtained with an optiview robotic trocar under direct visualization without difficulty. At this time pneumoperitoneum was obtained.  There were no abnormalities noted upon evaluation with a scope. We then    placed 2 further ports, a right-sided and epigastric port.  These were both 8 mm    ports that were placed under direct visualization after infiltrating with local    anesthetic. At this time, the robot was docked and we began with scissors in    the right hand and force bipolar graspers in the left. We started on the rigth    side at the ASIS and opened the peritoneal  Reflection. We immediately noted previous mesh placed.  It appeared the recurrence was below the previously placed mesh.  It was obvious that the previous repair on the right was laparoscopic.  We dissected this towards the midline. We dissected this down to Kt ligament    medially and cleared the indirect area, there was a moderate recurrent indirect hernia noted.  Once the pre peritoneal pocket was made adequately,    we turned attention to the left side, no hernia was noted.     Once we had made the pocket for the mesh and hernia had been reduced, we    placed a piece of a left Bard 3D Max mesh into the abdominal cavity. The mesh was positioned to  midline and    anteriorly to above the pubic symphysis. The mesh was sewn to Kt ligament    with 2-0 Vicryl suture medially, anteriorly to the rectus,  and to the transversus muscle laterally.  Peritoneal pocket was closed with  2-0 V-Loc suture in running fashion. Several holes in the peritoneum were closed as well.  Once this was complete, we inspected the area,  it was in good condition. No signs of any abnormalities. Mesh was in good    position. We then removed the needles from the abdominal cavity. We then    undocked the robot and removed the ports.   Ports were removed under direct visualization. The skin of all 3 ports was    closed with 4-0 Monocryl suture in subcuticular fashion. Mastisol and    Steri-Strips were placed. The patient was allowed to awake from general    anesthesia and transferred to bed for transfer to Recovery.      COMPLICATIONS: None.      SPONGE COUNT: Correct.      BLOOD LOSS: 15 mL      FLUIDS: Per Anesthesia.      BLOOD GIVEN: None.      DRAINS: None.

## 2023-08-21 NOTE — PLAN OF CARE
Chart reviewed. Preop nursing care completed per orders. Safe surgery checklist complete. Belongings in locker. Waiting for update to H&P, site geraldine, and anesthesia consent prior to surgery. Call bell within reach. Instructed pt to call for assistance.

## 2023-08-22 NOTE — ANESTHESIA POSTPROCEDURE EVALUATION
Anesthesia Post Evaluation    Patient: Thiago Thacker    Procedure(s) Performed: Procedure(s) (LRB):  XI ROBOTIC REPAIR, HERNIA, INGUINAL, RIGHT (recurrent) w/ mesh (N/A)    Final Anesthesia Type: general      Patient location during evaluation: PACU  Patient participation: Yes- Able to Participate  Level of consciousness: awake and alert  Post-procedure vital signs: reviewed and stable  Pain management: adequate  Airway patency: patent    PONV status at discharge: No PONV  Anesthetic complications: no      Cardiovascular status: blood pressure returned to baseline  Respiratory status: unassisted  Hydration status: euvolemic  Follow-up not needed.          Vitals Value Taken Time   /64 08/21/23 1900   Temp 36.7 °C (98.1 °F) 08/21/23 1900   Pulse 70 08/21/23 1900   Resp 18 08/21/23 1900   SpO2 97 % 08/21/23 1900         No case tracking events are documented in the log.      Pain/Yesenia Score: Yesenia Score: 10 (8/21/2023  5:30 PM)

## 2023-08-26 ENCOUNTER — HOSPITAL ENCOUNTER (OUTPATIENT)
Dept: RADIOLOGY | Facility: HOSPITAL | Age: 73
Discharge: HOME | End: 2023-08-26
Attending: INTERNAL MEDICINE
Payer: COMMERCIAL

## 2023-08-26 ENCOUNTER — PATIENT MESSAGE (OUTPATIENT)
Dept: HEMATOLOGY/ONCOLOGY | Facility: CLINIC | Age: 73
End: 2023-08-26
Payer: COMMERCIAL

## 2023-08-26 DIAGNOSIS — D69.6 THROMBOCYTOPENIA: ICD-10-CM

## 2023-08-26 PROCEDURE — 76700 US EXAM ABDOM COMPLETE: CPT | Mod: 26,,, | Performed by: RADIOLOGY

## 2023-08-26 PROCEDURE — 76700 US ABDOMEN COMPLETE: ICD-10-PCS | Mod: 26,,, | Performed by: RADIOLOGY

## 2023-08-26 PROCEDURE — 76700 US EXAM ABDOM COMPLETE: CPT | Mod: TC

## 2023-09-05 ENCOUNTER — OFFICE VISIT (OUTPATIENT)
Dept: SPORTS MEDICINE | Facility: CLINIC | Age: 73
End: 2023-09-05
Payer: COMMERCIAL

## 2023-09-05 VITALS
WEIGHT: 185.19 LBS | HEIGHT: 71 IN | BODY MASS INDEX: 25.93 KG/M2 | SYSTOLIC BLOOD PRESSURE: 126 MMHG | HEART RATE: 61 BPM | DIASTOLIC BLOOD PRESSURE: 70 MMHG

## 2023-09-05 DIAGNOSIS — M25.562 CHRONIC PAIN OF LEFT KNEE: Primary | ICD-10-CM

## 2023-09-05 DIAGNOSIS — G89.29 CHRONIC PAIN OF LEFT KNEE: Primary | ICD-10-CM

## 2023-09-05 PROCEDURE — 3008F BODY MASS INDEX DOCD: CPT | Mod: CPTII,S$GLB,, | Performed by: ORTHOPAEDIC SURGERY

## 2023-09-05 PROCEDURE — 1125F AMNT PAIN NOTED PAIN PRSNT: CPT | Mod: CPTII,S$GLB,, | Performed by: ORTHOPAEDIC SURGERY

## 2023-09-05 PROCEDURE — 1101F PR PT FALLS ASSESS DOC 0-1 FALLS W/OUT INJ PAST YR: ICD-10-PCS | Mod: CPTII,S$GLB,, | Performed by: ORTHOPAEDIC SURGERY

## 2023-09-05 PROCEDURE — 3288F PR FALLS RISK ASSESSMENT DOCUMENTED: ICD-10-PCS | Mod: CPTII,S$GLB,, | Performed by: ORTHOPAEDIC SURGERY

## 2023-09-05 PROCEDURE — 99999 PR PBB SHADOW E&M-EST. PATIENT-LVL III: ICD-10-PCS | Mod: PBBFAC,,, | Performed by: ORTHOPAEDIC SURGERY

## 2023-09-05 PROCEDURE — 1125F PR PAIN SEVERITY QUANTIFIED, PAIN PRESENT: ICD-10-PCS | Mod: CPTII,S$GLB,, | Performed by: ORTHOPAEDIC SURGERY

## 2023-09-05 PROCEDURE — 3078F PR MOST RECENT DIASTOLIC BLOOD PRESSURE < 80 MM HG: ICD-10-PCS | Mod: CPTII,S$GLB,, | Performed by: ORTHOPAEDIC SURGERY

## 2023-09-05 PROCEDURE — 3074F SYST BP LT 130 MM HG: CPT | Mod: CPTII,S$GLB,, | Performed by: ORTHOPAEDIC SURGERY

## 2023-09-05 PROCEDURE — 99214 OFFICE O/P EST MOD 30 MIN: CPT | Mod: S$GLB,,, | Performed by: ORTHOPAEDIC SURGERY

## 2023-09-05 PROCEDURE — 99999 PR PBB SHADOW E&M-EST. PATIENT-LVL III: CPT | Mod: PBBFAC,,, | Performed by: ORTHOPAEDIC SURGERY

## 2023-09-05 PROCEDURE — 99214 PR OFFICE/OUTPT VISIT, EST, LEVL IV, 30-39 MIN: ICD-10-PCS | Mod: S$GLB,,, | Performed by: ORTHOPAEDIC SURGERY

## 2023-09-05 PROCEDURE — 3074F PR MOST RECENT SYSTOLIC BLOOD PRESSURE < 130 MM HG: ICD-10-PCS | Mod: CPTII,S$GLB,, | Performed by: ORTHOPAEDIC SURGERY

## 2023-09-05 PROCEDURE — 3008F PR BODY MASS INDEX (BMI) DOCUMENTED: ICD-10-PCS | Mod: CPTII,S$GLB,, | Performed by: ORTHOPAEDIC SURGERY

## 2023-09-05 PROCEDURE — 1101F PT FALLS ASSESS-DOCD LE1/YR: CPT | Mod: CPTII,S$GLB,, | Performed by: ORTHOPAEDIC SURGERY

## 2023-09-05 PROCEDURE — 3288F FALL RISK ASSESSMENT DOCD: CPT | Mod: CPTII,S$GLB,, | Performed by: ORTHOPAEDIC SURGERY

## 2023-09-05 PROCEDURE — 3078F DIAST BP <80 MM HG: CPT | Mod: CPTII,S$GLB,, | Performed by: ORTHOPAEDIC SURGERY

## 2023-09-05 RX ORDER — MELOXICAM 15 MG/1
15 TABLET ORAL DAILY
Qty: 30 TABLET | Refills: 0 | Status: SHIPPED | OUTPATIENT
Start: 2023-09-05 | End: 2023-10-02

## 2023-09-06 ENCOUNTER — OFFICE VISIT (OUTPATIENT)
Dept: SURGERY | Facility: CLINIC | Age: 73
End: 2023-09-06
Payer: COMMERCIAL

## 2023-09-06 VITALS
HEIGHT: 71 IN | WEIGHT: 186.75 LBS | DIASTOLIC BLOOD PRESSURE: 74 MMHG | SYSTOLIC BLOOD PRESSURE: 133 MMHG | HEART RATE: 61 BPM | BODY MASS INDEX: 26.15 KG/M2

## 2023-09-06 DIAGNOSIS — Z09 POSTOP CHECK: Primary | ICD-10-CM

## 2023-09-06 PROCEDURE — 3078F PR MOST RECENT DIASTOLIC BLOOD PRESSURE < 80 MM HG: ICD-10-PCS | Mod: CPTII,S$GLB,, | Performed by: SURGERY

## 2023-09-06 PROCEDURE — 99999 PR PBB SHADOW E&M-EST. PATIENT-LVL IV: ICD-10-PCS | Mod: PBBFAC,,, | Performed by: SURGERY

## 2023-09-06 PROCEDURE — 3075F PR MOST RECENT SYSTOLIC BLOOD PRESS GE 130-139MM HG: ICD-10-PCS | Mod: CPTII,S$GLB,, | Performed by: SURGERY

## 2023-09-06 PROCEDURE — 3008F PR BODY MASS INDEX (BMI) DOCUMENTED: ICD-10-PCS | Mod: CPTII,S$GLB,, | Performed by: SURGERY

## 2023-09-06 PROCEDURE — 99024 PR POST-OP FOLLOW-UP VISIT: ICD-10-PCS | Mod: S$GLB,,, | Performed by: SURGERY

## 2023-09-06 PROCEDURE — 1160F PR REVIEW ALL MEDS BY PRESCRIBER/CLIN PHARMACIST DOCUMENTED: ICD-10-PCS | Mod: CPTII,S$GLB,, | Performed by: SURGERY

## 2023-09-06 PROCEDURE — 1126F AMNT PAIN NOTED NONE PRSNT: CPT | Mod: CPTII,S$GLB,, | Performed by: SURGERY

## 2023-09-06 PROCEDURE — 3078F DIAST BP <80 MM HG: CPT | Mod: CPTII,S$GLB,, | Performed by: SURGERY

## 2023-09-06 PROCEDURE — 99999 PR PBB SHADOW E&M-EST. PATIENT-LVL IV: CPT | Mod: PBBFAC,,, | Performed by: SURGERY

## 2023-09-06 PROCEDURE — 1126F PR PAIN SEVERITY QUANTIFIED, NO PAIN PRESENT: ICD-10-PCS | Mod: CPTII,S$GLB,, | Performed by: SURGERY

## 2023-09-06 PROCEDURE — 1160F RVW MEDS BY RX/DR IN RCRD: CPT | Mod: CPTII,S$GLB,, | Performed by: SURGERY

## 2023-09-06 PROCEDURE — 3288F FALL RISK ASSESSMENT DOCD: CPT | Mod: CPTII,S$GLB,, | Performed by: SURGERY

## 2023-09-06 PROCEDURE — 3075F SYST BP GE 130 - 139MM HG: CPT | Mod: CPTII,S$GLB,, | Performed by: SURGERY

## 2023-09-06 PROCEDURE — 1101F PT FALLS ASSESS-DOCD LE1/YR: CPT | Mod: CPTII,S$GLB,, | Performed by: SURGERY

## 2023-09-06 PROCEDURE — 99024 POSTOP FOLLOW-UP VISIT: CPT | Mod: S$GLB,,, | Performed by: SURGERY

## 2023-09-06 PROCEDURE — 1159F PR MEDICATION LIST DOCUMENTED IN MEDICAL RECORD: ICD-10-PCS | Mod: CPTII,S$GLB,, | Performed by: SURGERY

## 2023-09-06 PROCEDURE — 1159F MED LIST DOCD IN RCRD: CPT | Mod: CPTII,S$GLB,, | Performed by: SURGERY

## 2023-09-06 PROCEDURE — 3008F BODY MASS INDEX DOCD: CPT | Mod: CPTII,S$GLB,, | Performed by: SURGERY

## 2023-09-06 PROCEDURE — 1101F PR PT FALLS ASSESS DOC 0-1 FALLS W/OUT INJ PAST YR: ICD-10-PCS | Mod: CPTII,S$GLB,, | Performed by: SURGERY

## 2023-09-06 PROCEDURE — 3288F PR FALLS RISK ASSESSMENT DOCUMENTED: ICD-10-PCS | Mod: CPTII,S$GLB,, | Performed by: SURGERY

## 2023-09-06 NOTE — PROGRESS NOTES
HPI:  The patient is status post-robotic repair of recurrent right inguinal hernia.  Doing well.  Small seroma.      PHYSICAL EXAM:    In NAD  Incisions c/d/I  Small seroma    ASSESSMENT:    The patient is doing well after surgery.     PLAN:    Follow up PRN.  Activity: No heavy lifting for 4 weeks.        Hussein Sy MD

## 2023-09-07 ENCOUNTER — HOSPITAL ENCOUNTER (OUTPATIENT)
Dept: RADIOLOGY | Facility: HOSPITAL | Age: 73
Discharge: HOME | End: 2023-09-07
Attending: ORTHOPAEDIC SURGERY
Payer: COMMERCIAL

## 2023-09-07 DIAGNOSIS — M25.562 CHRONIC PAIN OF LEFT KNEE: ICD-10-CM

## 2023-09-07 DIAGNOSIS — G89.29 CHRONIC PAIN OF LEFT KNEE: ICD-10-CM

## 2023-09-07 PROCEDURE — 73721 MRI JNT OF LWR EXTRE W/O DYE: CPT | Mod: 26,LT,, | Performed by: RADIOLOGY

## 2023-09-07 PROCEDURE — 73721 MRI KNEE WITHOUT CONTRAST LEFT: ICD-10-PCS | Mod: 26,LT,, | Performed by: RADIOLOGY

## 2023-09-07 PROCEDURE — 73721 MRI JNT OF LWR EXTRE W/O DYE: CPT | Mod: TC,LT

## 2023-09-08 NOTE — PROGRESS NOTES
Telemedicine/Virtual Visit Documentation:     The patient location is: home    The chief complaint leading to consultation is: see HPI    VISIT TYPE X   Virtual visit with synchronous audio and video    Telephone E/M service x     Total time spent with patient: see X geraldine on chart below.   More than half of the time was spent counseling or coordinating care including prognosis, differential diagnosis, risks and benefits of treatment, instructions, compliance risk reductions     EST MINUTES X   09539 5    71841 10    69225 15    30078 25    55887 40    NEW     06829 10    39729 20    81096 30    56470 45    06285 60    PHONE      5-10    76740 11-20 x   99443 21-30      H&P  Orthopaedics      SUBJECTIVE:     History of Present Illness:  Patient is a 73 y.o. male who returns for follow-up discussion of his left knee.  I have seen him for both knees in the past.  Right knee is a bit more arthritic.  MRI ordered for left knee.  Was able to get in touch with him on Tuesday regarding his left knee MRI.    Review of patient's allergies indicates:   Allergen Reactions    Celebrex [celecoxib] Rash     Past Medical History:   Diagnosis Date    Coronary atherosclerosis     Headache     Hypercholesterolemia     Myocardial infarction 2011     Past Surgical History:   Procedure Laterality Date    BLEPHAROPLASTY      CATARACT EXTRACTION W/  INTRAOCULAR LENS IMPLANT Right 6/1/2023    Procedure: EXTRACTION, CATARACT, WITH IOL INSERTION;  Surgeon: Nessa Romo MD;  Location: [facility] OR;  Service: Ophthalmology;  Laterality: Right;    CATARACT EXTRACTION W/  INTRAOCULAR LENS IMPLANT Left 8/16/2023    Procedure: EXTRACTION, CATARACT, WITH IOL INSERTION;  Surgeon: Nessa Romo MD;  Location: [facility] OR;  Service: Ophthalmology;  Laterality: Left;    COMPLETE LASER PHOTOVAPORIZATION OF PROSTATE      CORONARY STENT PLACEMENT  2011    INGUINAL HERNIA REPAIR      ROBOT-ASSISTED LAPAROSCOPIC REPAIR OF INGUINAL HERNIA USING DA YRIS  XI N/A 8/21/2023    Procedure: XI ROBOTIC REPAIR, HERNIA, INGUINAL, RIGHT (recurrent) w/ mesh;  Surgeon: Hussein Sy Jr., MD;  Location: [facility] OR [address] [place] [address];  Service: General;  Laterality: N/A;    vein       Family History   Problem Relation Age of Onset    Colon cancer Mother 80    Heart attack Father 90     Social History     Tobacco Use    Smoking status: Never    Smokeless tobacco: Never   Substance Use Topics    Alcohol use: Yes     Comment: occasionally    Drug use: Never        Review of Systems:  Patient denies constitutional symptoms, cardiac symptoms, respiratory symptoms, GI symptoms.  The remainder of the musculoskeletal ROS is included in the HPI.      OBJECTIVE:     Physical Exam:  Gen:  No acute distress    Left knee MRI 9/7/23:  1. Horizontal flap tear body segment/posterior horn medial meniscus with mild medial displacement of the inferior flap and suspected reactive subchondral edema of the adjacent tibial plateau.  2. Patellofemoral and medial tibiofemoral osteoarthritis with chondral loss as detailed above.  3. Distal semimembranosus and pes anserine bursitis with prominent surrounding soft tissue edema.  4. Joint effusion with synovitis.    ASSESSMENT/PLAN:     A/P: Thiago Thacker is a 73 y.o. left knee displaced, complex medial meniscus tear, arthritis    Plan:  MRI findings reviewed with the patient.  Discussed potential benefit arthroscopy for a limited goals partial medial meniscectomy, chondroplasty.  I think that is reasonable considering this case given his reported pain and mechanical symptoms and the displaced undersurface flap tear into the medial gutter.  Details of surgery were discussed with the expected postop course.  He would like to continue conservative treatment for now.  He has a colonoscopy coming up and will check back filled with us after that procedure to let us know what he wants to do.  Alternative options of Euflexxa series at this time.

## 2023-09-11 ENCOUNTER — OFFICE VISIT (OUTPATIENT)
Dept: SPORTS MEDICINE | Facility: CLINIC | Age: 73
End: 2023-09-11
Payer: COMMERCIAL

## 2023-09-11 ENCOUNTER — TELEPHONE (OUTPATIENT)
Dept: SPORTS MEDICINE | Facility: CLINIC | Age: 73
End: 2023-09-11
Payer: COMMERCIAL

## 2023-09-11 ENCOUNTER — OFFICE VISIT (OUTPATIENT)
Dept: OPTOMETRY | Facility: CLINIC | Age: 73
End: 2023-09-11
Payer: COMMERCIAL

## 2023-09-11 DIAGNOSIS — Z98.890 POST-OPERATIVE STATE: Primary | ICD-10-CM

## 2023-09-11 DIAGNOSIS — M17.12 PRIMARY OSTEOARTHRITIS OF LEFT KNEE: Primary | ICD-10-CM

## 2023-09-11 DIAGNOSIS — Z96.1 PSEUDOPHAKIA OF BOTH EYES: ICD-10-CM

## 2023-09-11 DIAGNOSIS — Z98.41 S/P BILATERAL CATARACT EXTRACTION: ICD-10-CM

## 2023-09-11 DIAGNOSIS — H52.7 REFRACTIVE ERRORS: ICD-10-CM

## 2023-09-11 DIAGNOSIS — G89.29 CHRONIC PAIN OF LEFT KNEE: ICD-10-CM

## 2023-09-11 DIAGNOSIS — M25.562 CHRONIC PAIN OF LEFT KNEE: ICD-10-CM

## 2023-09-11 DIAGNOSIS — S83.232A COMPLEX TEAR OF MEDIAL MENISCUS OF LEFT KNEE, UNSPECIFIED WHETHER OLD OR CURRENT TEAR, INITIAL ENCOUNTER: ICD-10-CM

## 2023-09-11 DIAGNOSIS — Z98.42 S/P BILATERAL CATARACT EXTRACTION: ICD-10-CM

## 2023-09-11 PROCEDURE — 92015 PR REFRACTION: ICD-10-PCS | Mod: S$GLB,,, | Performed by: OPTOMETRIST

## 2023-09-11 PROCEDURE — 99999 PR PBB SHADOW E&M-EST. PATIENT-LVL IV: ICD-10-PCS | Mod: PBBFAC,,, | Performed by: OPTOMETRIST

## 2023-09-11 PROCEDURE — 1126F PR PAIN SEVERITY QUANTIFIED, NO PAIN PRESENT: ICD-10-PCS | Mod: CPTII,S$GLB,, | Performed by: OPTOMETRIST

## 2023-09-11 PROCEDURE — 1159F MED LIST DOCD IN RCRD: CPT | Mod: CPTII,S$GLB,, | Performed by: OPTOMETRIST

## 2023-09-11 PROCEDURE — 99024 PR POST-OP FOLLOW-UP VISIT: ICD-10-PCS | Mod: S$GLB,,, | Performed by: OPTOMETRIST

## 2023-09-11 PROCEDURE — 1159F PR MEDICATION LIST DOCUMENTED IN MEDICAL RECORD: ICD-10-PCS | Mod: CPTII,S$GLB,, | Performed by: OPTOMETRIST

## 2023-09-11 PROCEDURE — 92015 DETERMINE REFRACTIVE STATE: CPT | Mod: S$GLB,,, | Performed by: OPTOMETRIST

## 2023-09-11 PROCEDURE — 99213 PR OFFICE/OUTPT VISIT, EST, LEVL III, 20-29 MIN: ICD-10-PCS | Mod: 95,,, | Performed by: ORTHOPAEDIC SURGERY

## 2023-09-11 PROCEDURE — 99999 PR PBB SHADOW E&M-EST. PATIENT-LVL IV: CPT | Mod: PBBFAC,,, | Performed by: OPTOMETRIST

## 2023-09-11 PROCEDURE — 3288F FALL RISK ASSESSMENT DOCD: CPT | Mod: CPTII,S$GLB,, | Performed by: OPTOMETRIST

## 2023-09-11 PROCEDURE — 3288F PR FALLS RISK ASSESSMENT DOCUMENTED: ICD-10-PCS | Mod: CPTII,S$GLB,, | Performed by: OPTOMETRIST

## 2023-09-11 PROCEDURE — 1101F PR PT FALLS ASSESS DOC 0-1 FALLS W/OUT INJ PAST YR: ICD-10-PCS | Mod: CPTII,S$GLB,, | Performed by: OPTOMETRIST

## 2023-09-11 PROCEDURE — 1101F PT FALLS ASSESS-DOCD LE1/YR: CPT | Mod: CPTII,S$GLB,, | Performed by: OPTOMETRIST

## 2023-09-11 PROCEDURE — 99213 OFFICE O/P EST LOW 20 MIN: CPT | Mod: 95,,, | Performed by: ORTHOPAEDIC SURGERY

## 2023-09-11 PROCEDURE — 99024 POSTOP FOLLOW-UP VISIT: CPT | Mod: S$GLB,,, | Performed by: OPTOMETRIST

## 2023-09-11 PROCEDURE — 1126F AMNT PAIN NOTED NONE PRSNT: CPT | Mod: CPTII,S$GLB,, | Performed by: OPTOMETRIST

## 2023-09-11 NOTE — PROGRESS NOTES
"HPI    Patient in for progress check/post-op cjeck-up .  S/P bilateral cataract surgery, with resultant monovision effect, per   pupil.    Recently ran out of post-op drops.  Surgery most recent done in [place]    Being followed for (diagnosis):   Cataract Sx OU Dr. Romo     Date last eye exam prior to cataract surgery  06/07/2022 by Dr. Baeza      Prescribed eye medications(s) using:  No. Has run out of post-op drops    OTC eye medication(s) using:  No    Dr. Thacker feels he needs reading glasses.       PD = 61  mm / 57 mm  Reading disrt = 15.5"        Last edited by Alen Agudelo OD on 9/11/2023 11:54 AM.            Assessment /Plan     For exam results, see Encounter Report.    1. Post-operative state        2. S/P bilateral cataract extraction        3. Pseudophakia of both eyes        4. Refractive errors                     S/P bilateral cataract extraction.  Bilateral posterior chamber intraocular lens.    Dr. Coburn has finished regimen of treatment of post-operative eyedrops.  Residual refractive error in each eye.  Best-corrected VA of 20/20(-) in the right eye and 20/20 in the left eye.  New spectacle lens Rx issued for use as desired:  bifocal lenses or single vision distance lenses.    Recheck in one year - or prior, if any problems or bothersome changes in visual acuity noted in the interim         "

## 2023-09-11 NOTE — TELEPHONE ENCOUNTER
LVM with patient. Dr. King will call him for virtual at 5pm.     Susan Ross [facility], OT  Sports Medicine Assistant - Dr. Andry King   [facility] [place]

## 2023-09-11 NOTE — PATIENT INSTRUCTIONS
S/P bilateral cataract extraction.  Bilateral posterior chamber intraocular lens.    Dr. Coburn has finished regimen of treatment of post-operative eyedrops.  Residual refractive error in each eye.  Best-corrected VA of 20/20(-) in the right eye and 20/20 in the left eye.  New spectacle lens Rx issued for use as desired:  bifocal lenses or single vision distance lenses.    Recheck in one year - or prior, if any problems or bothersome changes in visual acuity noted in the interim

## 2023-09-18 ENCOUNTER — TELEPHONE (OUTPATIENT)
Dept: ENDOSCOPY | Facility: HOSPITAL | Age: 73
End: 2023-09-18

## 2023-09-26 ENCOUNTER — TELEPHONE (OUTPATIENT)
Dept: OPHTHALMOLOGY | Facility: CLINIC | Age: 73
End: 2023-09-26
Payer: COMMERCIAL

## 2023-09-26 NOTE — TELEPHONE ENCOUNTER
Call to follow up with pt no answer.  Left message asking to call back.    ----- Message from Shefali Huff sent at 9/25/2023  8:41 AM CDT -----    ----- Message -----  From: Agnes Engle  Sent: 9/25/2023   8:39 AM CDT  To: Demetrius MOREL Staff    Consult/Advisory    Name Of Caller:Thiago       Contact Preference:[phone]    Nature of call: Ptn called regarding issues he is having due to the sx please call ptn to further assist

## 2023-10-01 DIAGNOSIS — E03.9 HYPOTHYROIDISM, UNSPECIFIED TYPE: ICD-10-CM

## 2023-10-01 DIAGNOSIS — M25.562 CHRONIC PAIN OF LEFT KNEE: ICD-10-CM

## 2023-10-01 DIAGNOSIS — G89.29 CHRONIC PAIN OF LEFT KNEE: ICD-10-CM

## 2023-10-01 RX ORDER — LEVOTHYROXINE SODIUM 175 UG/1
175 TABLET ORAL
Qty: 90 TABLET | Refills: 3 | Status: SHIPPED | OUTPATIENT
Start: 2023-10-01 | End: 2024-01-03

## 2023-10-01 NOTE — TELEPHONE ENCOUNTER
No care due was identified.  [facility] Embedded Care Due Messages. Reference number: [id number].   10/01/2023 6:53:04 AM CDT

## 2023-10-02 RX ORDER — MELOXICAM 15 MG/1
15 TABLET ORAL
Qty: 30 TABLET | Refills: 0 | Status: SHIPPED | OUTPATIENT
Start: 2023-10-02 | End: 2024-02-19 | Stop reason: SDUPTHER

## 2023-10-04 ENCOUNTER — PATIENT MESSAGE (OUTPATIENT)
Dept: ENDOSCOPY | Facility: HOSPITAL | Age: 73
End: 2023-10-04
Payer: COMMERCIAL

## 2023-10-06 ENCOUNTER — ANESTHESIA EVENT (OUTPATIENT)
Dept: ENDOSCOPY | Facility: HOSPITAL | Age: 73
End: 2023-10-06
Payer: COMMERCIAL

## 2023-10-06 NOTE — ANESTHESIA PREPROCEDURE EVALUATION
[facility]  Anesthesia Pre-Operative Evaluation         Patient Name: Thiago Thacker  YOB: 1950  MRN: [id number]      TTE 1/26/22:   The left ventricle is normal in size with normal systolic function.   The estimated ejection fraction is 65%.   Grade II left ventricular diastolic dysfunction.   Normal right ventricular size with normal right ventricular systolic function.   Moderate aortic valve sclerosis.   Mild mitral regurgitation.   Mild tricuspid regurgitation.   The estimated PA systolic pressure is 20 mmHg.   Normal central venous pressure (3 mmHg).    LDA: None documented.     Prev airway: None documented.      Patient Active Problem List   Diagnosis    BPH (benign prostatic hyperplasia)    Hypercholesterolemia    Hypothyroid    Muscle weakness of lower extremity    Decreased range of motion (ROM) of right knee    Coronary atherosclerosis    Hx of myocardial infarction    Thrombocytopenia    Osteoarthritis of right knee    Cervical myofascial pain syndrome    Rash of neck       Review of patient's allergies indicates:   Allergen Reactions    Celebrex [celecoxib] Rash       Current Inpatient Medications:      Current Outpatient Medications on File Prior to Visit   Medication Sig Dispense Refill    acarbose (PRECOSE) 25 MG Tab       acetaminophen (TYLENOL) 500 MG tablet Take 1 tablet (500 mg total) by mouth every 6 (six) hours as needed for Pain (alternate with ibuprofen).  0    alpha lipoic acid 200 mg Tab       aspirin (ECOTRIN) 81 MG EC tablet Take 81 mg by mouth once daily.      atorvastatin (LIPITOR) 40 MG tablet Take 1 tablet (40 mg total) by mouth once daily. 90 tablet 1    erythromycin with ethanoL (EMGEL) 2 % gel Apply topically 2 (two) times daily. Prn breakouts of posterior neck 30 g 3    finasteride (PROSCAR) 5 mg tablet Take 1 tablet (5 mg total) by mouth once daily. 90 tablet 1    GREEN TEA EXTRACT ORAL Take 1 capsule by mouth once  daily.      ibuprofen (ADVIL,MOTRIN) 400 MG tablet Take 1 tablet (400 mg total) by mouth every 6 (six) hours as needed for Other (pain). Alternate with tylenol      levothyroxine (SYNTHROID, LEVOTHROID) 175 MCG tablet TAKE 1 TABLET (175 MCG TOTAL) BY MOUTH BEFORE BREAKFAST. 90 tablet 3    meloxicam (MOBIC) 15 MG tablet Take 1 tablet by mouth once daily 30 tablet 0    multivitamin (THERAGRAN) per tablet Take 1 tablet by mouth once daily.      omega-3/dha/epa/fish oil (OMEGA-3 FISH OIL ORAL) Take 4 g by mouth once daily.      oxyCODONE (ROXICODONE) 5 MG immediate release tablet Take 1 tablet (5 mg total) by mouth every 4 (four) hours as needed for Pain (if pain is not relieved by alternating tylenol and ibuprofen). 8 tablet 0    prednisolon/gatiflox/bromfenac (PREDNISOL ACE-GATIFLOX-BROMFEN) 1-0.5-0.075 % DrpS Apply 1 drop to eye 3 (three) times daily. 5 mL 3    QUERCETIN DIHYDRATE, BULK, MISC 1,000 mg by Misc.(Non-Drug; Combo Route) route once daily. Quercetin      RESVERATROL ORAL Take 200 mg by mouth. Resveratrol (Longevinex formulation)      sirolimus (RAPAMUNE) 1 MG Tab Take 10 mg by mouth once a week. Sat      tadalafiL (CIALIS) 5 MG tablet Take 1 tablet (5 mg total) by mouth daily as needed for Erectile Dysfunction. 90 tablet 0    taurine 1,000 mg Cap Take 6 g by mouth once daily.      triamcinolone acetonide 0.1% (KENALOG) 0.1 % ointment Apply topically 2 (two) times daily. 15 g 0    turmeric (CURCUMIN MISC) by Misc.(Non-Drug; Combo Route) route.       Current Facility-Administered Medications on File Prior to Visit   Medication Dose Route Frequency Provider Last Rate Last Admin    balanced salt irrigation intra-ocular solution 1 drop  1 drop Right Eye On Call Procedure Nessa Romo MD        balanced salt irrigation intra-ocular solution 1 drop  1 drop Left Eye On Call Procedure Nessa Romo MD        phenylephrine HCL 10% ophthalmic solution 1 drop  1 drop Right Eye PRN Demetrius  Nessa MOREL MD        phenylephrine HCL 10% ophthalmic solution 1 drop  1 drop Left Eye Nessa Srivastava MD        proparacaine 0.5 % ophthalmic solution 1 drop  1 drop Left Eye Nessa Srivastava MD        sodium chloride 0.9% flush 2 mL  2 mL Intravenous Nessa Srivastava MD        sodium chloride 0.9% flush 2 mL  2 mL Intravenous PRN Nessa Romo MD        TETRAcaine HCl (PF) 0.5 % Drop 1 drop  1 drop Left Eye Nessa Srivastava MD           Past Surgical History:   Procedure Laterality Date    BLEPHAROPLASTY      CATARACT EXTRACTION W/  INTRAOCULAR LENS IMPLANT Right 6/1/2023    Procedure: EXTRACTION, CATARACT, WITH IOL INSERTION;  Surgeon: Nessa Romo MD;  Location: [facility] OR;  Service: Ophthalmology;  Laterality: Right;    CATARACT EXTRACTION W/  INTRAOCULAR LENS IMPLANT Left 8/16/2023    Procedure: EXTRACTION, CATARACT, WITH IOL INSERTION;  Surgeon: Nessa Romo MD;  Location: [facility] OR;  Service: Ophthalmology;  Laterality: Left;    COMPLETE LASER PHOTOVAPORIZATION OF PROSTATE      CORONARY STENT PLACEMENT  2011    INGUINAL HERNIA REPAIR      ROBOT-ASSISTED LAPAROSCOPIC REPAIR OF INGUINAL HERNIA USING DA YRIS XI N/A 8/21/2023    Procedure: XI ROBOTIC REPAIR, HERNIA, INGUINAL, RIGHT (recurrent) w/ mesh;  Surgeon: Hussein Sy Jr., MD;  Location: [address];  Service: General;  Laterality: N/A;    vein         Social History     Socioeconomic History    Marital status: Single   Tobacco Use    Smoking status: Never    Smokeless tobacco: Never   Substance and Sexual Activity    Alcohol use: Yes     Comment: occasionally    Drug use: Never    Sexual activity: Not Currently     Partners: Female     Social Determinants of Health     Financial Resource Strain: Low Risk  (8/4/2023)    Overall Financial Resource Strain (CARDIA)     Difficulty of Paying Living Expenses: Not hard at all   Food Insecurity: No Food Insecurity (8/4/2023)    Hunger Vital Sign      Worried About Running Out of Food in the Last Year: Never true     Ran Out of Food in the Last Year: Never true   Transportation Needs: No Transportation Needs (8/4/2023)    PRAPARE - Transportation     Lack of Transportation (Medical): No     Lack of Transportation (Non-Medical): No   Physical Activity: Sufficiently Active (8/4/2023)    Exercise Vital Sign     Days of Exercise per Week: 7 days     Minutes of Exercise per Session: 40 min   Social Connections: Moderately Isolated (8/4/2023)    Social Connection and Isolation Panel [NHANES]     Frequency of Communication with Friends and Family: More than three times a week     Frequency of Social Gatherings with Friends and Family: Once a week     Attends Confucianism Services: Never     Active Member of Clubs or Organizations: Yes     Attends Club or Organization Meetings: Never     Marital Status: Never    Housing Stability: Unknown (8/4/2023)    Housing Stability Vital Sign     Unable to Pay for Housing in the Last Year: No     Unstable Housing in the Last Year: No       OBJECTIVE:     Vital Signs Range (Last 24H):  BP: ()/()   Arterial Line BP: ()/()       Significant Labs:  Lab Results   Component Value Date    WBC 3.68 (L) 08/17/2023    WBC 3.76 (L) 08/17/2023    HGB 14.0 08/17/2023    HGB 13.8 (L) 08/17/2023    HCT 41.2 08/17/2023    HCT 41.0 08/17/2023     (L) 08/17/2023     (L) 08/17/2023    CHOL 135 07/10/2023    TRIG 63 07/10/2023    HDL 46 07/10/2023    ALT 27 08/17/2023    AST 28 08/17/2023     08/17/2023    K 4.3 08/17/2023     08/17/2023    CREATININE 0.9 08/17/2023    BUN 14 08/17/2023    CO2 27 08/17/2023    TSH 2.808 07/10/2023    PSA 0.64 01/25/2022       Diagnostic Studies: No relevant studies.    EKG:   Results for orders placed or performed in visit on 04/22/22   IN OFFICE EKG 12-LEAD (to [place])    Collection Time: 04/22/22 10:54 AM    Narrative    Test Reason : I25.10,    Vent. Rate : 056 BPM      Atrial Rate : 056 BPM     P-R Int : 190 ms          QRS Dur : 098 ms      QT Int : 402 ms       P-R-T Axes : 062 065 061 degrees     QTc Int : 387 ms    Sinus bradycardia  Minimal voltage criteria for LVH, may be normal variant  No previous ECGs available  Confirmed by Moni HINTON, Todd YUEN ([id number]) on 4/29/2022 2:47:24 PM    Referred By: SELINA ROTH           Confirmed By:Todd Montenegro MD       2D ECHO:  TTE:  Results for orders placed or performed during the hospital encounter of 03/08/22   Echo   Result Value Ref Range    Ascending aorta 3.25 cm    STJ 3.19 cm    AV mean gradient 8 mmHg    Ao peak ron 1.86 m/s    Ao VTI 41.85 cm    IVRT 121.79 msec    IVS 0.98 0.6 - 1.1 cm    LA size 3.60 cm    Left Atrium Major Axis 5.07 cm    Left Atrium Minor Axis 5.41 cm    LVIDd 5.08 3.5 - 6.0 cm    LVIDs 3.13 2.1 - 4.0 cm    LVOT diameter 2.26 cm    LVOT peak VTI 30.76 cm    Posterior Wall 0.94 0.6 - 1.1 cm    MV Peak A Ron 0.55 m/s    E wave deceleration time 331.44 msec    MV Peak E Ron 0.67 m/s    RA Major Axis 5.73 cm    RA Width 2.90 cm    RVDD 3.87 cm    Sinus 3.75 cm    TAPSE 2.04 cm    TR Max Ron 2.04 m/s    LA WIDTH 3.60 cm    PV PEAK VELOCITY 1.22 cm/s    LV Diastolic Volume 122.70 mL    LV Systolic Volume 38.77 mL    LVOT peak ron 1.50 m/s    TDI LATERAL 0.12 m/s    TDI SEPTAL 0.07 m/s    Mr max ron 0.05 m/s    LV LATERAL E/E' RATIO 5.58 m/s    LV SEPTAL E/E' RATIO 9.57 m/s    FS 38 %    LA volume 57.66 cm3    LV mass 176.90 g    Left Ventricle Relative Wall Thickness 0.37 cm    AV valve area 2.95 cm2    AV Velocity Ratio 0.81     AV index (prosthetic) 0.74     E/A ratio 1.22     Mean e' 0.10 m/s    LVOT area 4.0 cm2    LVOT stroke volume 123.33 cm3    AV peak gradient 14 mmHg    E/E' ratio 7.05 m/s    Triscuspid Valve Regurgitation Peak Gradient 17 mmHg    BSA 2.07 m2    LV Systolic Volume Index 18.8 mL/m2    LV Diastolic Volume Index 59.56 mL/m2    LA Volume Index 28.0 mL/m2    LV Mass Index 86 g/m2    LA Volume  Index (Mod) 23.8 mL/m2    LA volume (mod) 49.00 cm3    Right Atrial Pressure (from IVC) 3 mmHg    EF 65 %    TV resting pulmonary artery pressure 20 mmHg    Narrative    · The left ventricle is normal in size with normal systolic function.  · The estimated ejection fraction is 65%.  · Grade II left ventricular diastolic dysfunction.  · Normal right ventricular size with normal right ventricular systolic   function.  · Moderate aortic valve sclerosis.  · Mild mitral regurgitation.  · Mild tricuspid regurgitation.  · The estimated PA systolic pressure is 20 mmHg.  · Normal central venous pressure (3 mmHg).          GLENNY:  No results found for this or any previous visit.    ASSESSMENT/PLAN:         Pre-op Assessment    I have reviewed the Patient Summary Reports.    I have reviewed the NPO Status.   I have reviewed the Medications.     Review of Systems  Anesthesia Hx:  No problems with previous Anesthesia  History of prior surgery of interest to airway management or planning: Denies Family Hx of Anesthesia complications.   Denies Personal Hx of Anesthesia complications.   Social:  Non-Smoker, No Alcohol Use    Hematology/Oncology:  Hematology Normal   Oncology Normal     EENT/Dental:EENT/Dental Normal   Cardiovascular:   Exercise tolerance: good Hypertension Past MI CAD   ECG has been reviewed.    Pulmonary:  Pulmonary Normal    Renal/:  Renal/ Normal     Hepatic/GI:  Hepatic/GI Normal    Musculoskeletal:   Arthritis     Neurological:   Headaches    Endocrine:   Hypothyroidism    Dermatological:  Skin Normal    Psych:  Psychiatric Normal           Physical Exam  General: Well nourished, Cooperative, Alert and Oriented    Airway:  Mallampati: II   Mouth Opening: Normal  TM Distance: Normal  Tongue: Normal  Neck ROM: Normal ROM    Dental:  Intact        Anesthesia Plan  Type of Anesthesia, risks & benefits discussed:    Anesthesia Type: Gen Natural Airway  Intra-op Monitoring Plan: Standard ASA Monitors  Induction:   IV  Informed Consent: Informed consent signed with the Patient and all parties understand the risks and agree with anesthesia plan.  All questions answered. Patient consented to blood products? No  ASA Score: 3  Day of Surgery Review of History & Physical: H&P Update referred to the surgeon/provider.  Anesthesia Plan Notes: Ok to take medical transport home.    Ready For Surgery From Anesthesia Perspective.     .

## 2023-10-09 ENCOUNTER — ANESTHESIA (OUTPATIENT)
Dept: ENDOSCOPY | Facility: HOSPITAL | Age: 73
End: 2023-10-09
Payer: COMMERCIAL

## 2023-10-09 ENCOUNTER — HOSPITAL ENCOUNTER (OUTPATIENT)
Facility: HOSPITAL | Age: 73
Discharge: HOME | End: 2023-10-09
Attending: INTERNAL MEDICINE | Admitting: INTERNAL MEDICINE
Payer: COMMERCIAL

## 2023-10-09 VITALS
HEART RATE: 59 BPM | RESPIRATION RATE: 20 BRPM | DIASTOLIC BLOOD PRESSURE: 63 MMHG | OXYGEN SATURATION: 99 % | TEMPERATURE: 98 F | SYSTOLIC BLOOD PRESSURE: 143 MMHG

## 2023-10-09 DIAGNOSIS — Z12.11 SCREEN FOR COLON CANCER: ICD-10-CM

## 2023-10-09 DIAGNOSIS — Z12.11 COLON CANCER SCREENING: Primary | ICD-10-CM

## 2023-10-09 PROCEDURE — 99900035 HC TECH TIME PER 15 MIN (STAT)

## 2023-10-09 PROCEDURE — 88305 TISSUE EXAM BY PATHOLOGIST: CPT | Mod: 26,,, | Performed by: STUDENT IN AN ORGANIZED HEALTH CARE EDUCATION/TRAINING PROGRAM

## 2023-10-09 PROCEDURE — 45385 COLONOSCOPY W/LESION REMOVAL: CPT | Mod: PT | Performed by: INTERNAL MEDICINE

## 2023-10-09 PROCEDURE — 94761 N-INVAS EAR/PLS OXIMETRY MLT: CPT

## 2023-10-09 PROCEDURE — 37000008 HC ANESTHESIA 1ST 15 MINUTES: Performed by: INTERNAL MEDICINE

## 2023-10-09 PROCEDURE — 27201089 HC SNARE, DISP (ANY): Performed by: INTERNAL MEDICINE

## 2023-10-09 PROCEDURE — 88305 TISSUE EXAM BY PATHOLOGIST: CPT | Performed by: STUDENT IN AN ORGANIZED HEALTH CARE EDUCATION/TRAINING PROGRAM

## 2023-10-09 PROCEDURE — D9220A PRA ANESTHESIA: ICD-10-PCS | Mod: 33,,, | Performed by: NURSE ANESTHETIST, CERTIFIED REGISTERED

## 2023-10-09 PROCEDURE — D9220A PRA ANESTHESIA: Mod: 33,,, | Performed by: NURSE ANESTHETIST, CERTIFIED REGISTERED

## 2023-10-09 PROCEDURE — 45385 PR COLONOSCOPY,REMV LESN,SNARE: ICD-10-PCS | Mod: 33,,, | Performed by: INTERNAL MEDICINE

## 2023-10-09 PROCEDURE — 45385 COLONOSCOPY W/LESION REMOVAL: CPT | Mod: 33,,, | Performed by: INTERNAL MEDICINE

## 2023-10-09 PROCEDURE — 88305 TISSUE EXAM BY PATHOLOGIST: ICD-10-PCS | Mod: 26,,, | Performed by: STUDENT IN AN ORGANIZED HEALTH CARE EDUCATION/TRAINING PROGRAM

## 2023-10-09 PROCEDURE — 25000003 PHARM REV CODE 250: Performed by: NURSE ANESTHETIST, CERTIFIED REGISTERED

## 2023-10-09 PROCEDURE — 37000009 HC ANESTHESIA EA ADD 15 MINS: Performed by: INTERNAL MEDICINE

## 2023-10-09 PROCEDURE — 63600175 PHARM REV CODE 636 W HCPCS: Performed by: NURSE ANESTHETIST, CERTIFIED REGISTERED

## 2023-10-09 RX ORDER — PROPOFOL 10 MG/ML
VIAL (ML) INTRAVENOUS
Status: DISCONTINUED | OUTPATIENT
Start: 2023-10-09 | End: 2023-10-09

## 2023-10-09 RX ORDER — PROPOFOL 10 MG/ML
VIAL (ML) INTRAVENOUS CONTINUOUS PRN
Status: DISCONTINUED | OUTPATIENT
Start: 2023-10-09 | End: 2023-10-09

## 2023-10-09 RX ORDER — LIDOCAINE HYDROCHLORIDE 20 MG/ML
INJECTION, SOLUTION EPIDURAL; INFILTRATION; INTRACAUDAL; PERINEURAL
Status: DISCONTINUED | OUTPATIENT
Start: 2023-10-09 | End: 2023-10-09

## 2023-10-09 RX ORDER — SODIUM CHLORIDE 9 MG/ML
INJECTION, SOLUTION INTRAVENOUS CONTINUOUS
Status: DISCONTINUED | OUTPATIENT
Start: 2023-10-09 | End: 2023-10-09 | Stop reason: HOSPADM

## 2023-10-09 RX ADMIN — LIDOCAINE HYDROCHLORIDE 80 MG: 20 INJECTION, SOLUTION EPIDURAL; INFILTRATION; INTRACAUDAL; PERINEURAL at 07:10

## 2023-10-09 RX ADMIN — PROPOFOL 150 MCG/KG/MIN: 10 INJECTION, EMULSION INTRAVENOUS at 07:10

## 2023-10-09 RX ADMIN — SODIUM CHLORIDE: 0.9 INJECTION, SOLUTION INTRAVENOUS at 07:10

## 2023-10-09 RX ADMIN — PROPOFOL 100 MG: 10 INJECTION, EMULSION INTRAVENOUS at 07:10

## 2023-10-09 NOTE — H&P
Short Stay Endoscopy History and Physical    PCP - Kristyn Reyes MD    Procedure - Colonoscopy  Sedation: GA  ASA - per anesthesia  Mallampati - per anesthesia  History of Anesthesia problems - no  Family history Anesthesia problems -  no     HPI:  This is a 73 y.o. male here for evaluation of : Screening for CRC    Reflux - no  Dysphagia - no  Abdominal pain - no  Diarrhea - no    ROS:  Constitutional: No fevers, chills, No weight loss  ENT: No allergies  CV: No chest pain  Pulm: No cough, No shortness of breath  Ophtho: No vision changes  GI: see HPI  Medical History:  has a past medical history of Coronary atherosclerosis, Headache, Hypercholesterolemia, and Myocardial infarction (2011).    Surgical History:  has a past surgical history that includes Inguinal hernia repair; Complete laser photovaporization of prostate; vein; Blepharoplasty; Coronary stent placement (2011); Cataract extraction w/  intraocular lens implant (Right, 6/1/2023); Cataract extraction w/  intraocular lens implant (Left, 8/16/2023); and Robot-assisted laparoscopic repair of inguinal hernia using da Troy Xi (N/A, 8/21/2023).    Family History: family history includes Colon cancer (age of onset: 80) in his mother; Heart attack (age of onset: 90) in his father.. Otherwise no colon cancer, inflammatory bowel disease, or GI malignancies.    Social History:  reports that he has never smoked. He has never used smokeless tobacco. He reports current alcohol use. He reports that he does not use drugs.    Review of patient's allergies indicates:   Allergen Reactions    Celebrex [celecoxib] Rash       Medications:   Medications Prior to Admission   Medication Sig Dispense Refill Last Dose    acarbose (PRECOSE) 25 MG Tab    10/8/2023    aspirin (ECOTRIN) 81 MG EC tablet Take 81 mg by mouth once daily.   10/8/2023    atorvastatin (LIPITOR) 40 MG tablet Take 1 tablet (40 mg total) by mouth once daily. 90 tablet 1 10/8/2023    finasteride (PROSCAR) 5 mg  tablet Take 1 tablet (5 mg total) by mouth once daily. 90 tablet 1 10/8/2023    GREEN TEA EXTRACT ORAL Take 1 capsule by mouth once daily.   10/8/2023    levothyroxine (SYNTHROID, LEVOTHROID) 175 MCG tablet TAKE 1 TABLET (175 MCG TOTAL) BY MOUTH BEFORE BREAKFAST. 90 tablet 3 10/8/2023    meloxicam (MOBIC) 15 MG tablet Take 1 tablet by mouth once daily 30 tablet 0 10/8/2023    multivitamin (THERAGRAN) per tablet Take 1 tablet by mouth once daily.   10/8/2023    sirolimus (RAPAMUNE) 1 MG Tab Take 10 mg by mouth once a week. Sat   10/8/2023    turmeric (CURCUMIN MISC) by Misc.(Non-Drug; Combo Route) route.   10/8/2023    acetaminophen (TYLENOL) 500 MG tablet Take 1 tablet (500 mg total) by mouth every 6 (six) hours as needed for Pain (alternate with ibuprofen).  0     alpha lipoic acid 200 mg Tab        erythromycin with ethanoL (EMGEL) 2 % gel Apply topically 2 (two) times daily. Prn breakouts of posterior neck 30 g 3 More than a month    ibuprofen (ADVIL,MOTRIN) 400 MG tablet Take 1 tablet (400 mg total) by mouth every 6 (six) hours as needed for Other (pain). Alternate with tylenol       omega-3/dha/epa/fish oil (OMEGA-3 FISH OIL ORAL) Take 4 g by mouth once daily.       oxyCODONE (ROXICODONE) 5 MG immediate release tablet Take 1 tablet (5 mg total) by mouth every 4 (four) hours as needed for Pain (if pain is not relieved by alternating tylenol and ibuprofen). 8 tablet 0     prednisolon/gatiflox/bromfenac (PREDNISOL ACE-GATIFLOX-BROMFEN) 1-0.5-0.075 % DrpS Apply 1 drop to eye 3 (three) times daily. 5 mL 3     QUERCETIN DIHYDRATE, BULK, MISC 1,000 mg by Misc.(Non-Drug; Combo Route) route once daily. Quercetin       RESVERATROL ORAL Take 200 mg by mouth. Resveratrol (Longevinex formulation)       tadalafiL (CIALIS) 5 MG tablet Take 1 tablet (5 mg total) by mouth daily as needed for Erectile Dysfunction. 90 tablet 0     taurine 1,000 mg Cap Take 6 g by mouth once daily.       triamcinolone acetonide 0.1% (KENALOG) 0.1  % ointment Apply topically 2 (two) times daily. 15 g 0 More than a month       Objective Findings:    Vital Signs: Per nursing notes.    Physical Exam:  General Appearance: Well appearing in no acute distress  Head:   Normocephalic, without obvious abnormality  Eyes:    No scleral icterus  Airway: Open  Neck: No restriction in mobility  Lungs: CTA bilaterally in anterior and posterior fields, no wheezes, no crackles.  Heart:  Regular rate and rhythm, S1, S2 normal, no murmurs heard  Abdomen: Soft, non tender, non distended      Labs:  Lab Results   Component Value Date    WBC 3.68 (L) 08/17/2023    WBC 3.76 (L) 08/17/2023    HGB 14.0 08/17/2023    HGB 13.8 (L) 08/17/2023    HCT 41.2 08/17/2023    HCT 41.0 08/17/2023     (L) 08/17/2023     (L) 08/17/2023    CHOL 135 07/10/2023    TRIG 63 07/10/2023    HDL 46 07/10/2023    ALT 27 08/17/2023    AST 28 08/17/2023     08/17/2023    K 4.3 08/17/2023     08/17/2023    CREATININE 0.9 08/17/2023    BUN 14 08/17/2023    CO2 27 08/17/2023    TSH 2.808 07/10/2023    PSA 0.64 01/25/2022         I have explained the risks and benefits of endoscopy procedures to the patient including but not limited to bleeding, perforation, infection, and death.    Thank you so much for allowing me to participate in the care of Thiago Richardson MD

## 2023-10-09 NOTE — ANESTHESIA POSTPROCEDURE EVALUATION
Anesthesia Post Evaluation    Patient: Thiago Thacker    Procedure(s) Performed: Procedure(s) (LRB):  COLONOSCOPY (N/A)    Final Anesthesia Type: general      Patient location during evaluation: PACU  Patient participation: Yes- Able to Participate  Level of consciousness: awake and alert  Post-procedure vital signs: reviewed and stable  Pain management: adequate  Airway patency: patent    PONV status at discharge: No PONV  Anesthetic complications: no      Cardiovascular status: blood pressure returned to baseline and hemodynamically stable  Respiratory status: unassisted  Hydration status: euvolemic  Follow-up not needed.          Vitals Value Taken Time   /63 10/09/23 0930   Temp 36.9 °C (98.4 °F) 10/09/23 0816   Pulse 59 10/09/23 0930   Resp 20 10/09/23 0930   SpO2 99 % 10/09/23 0930         Event Time   Out of Recovery 09:15:00         Pain/Yesenia Score: Yesenia Score: 10 (10/9/2023  9:30 AM)

## 2023-10-09 NOTE — TRANSFER OF CARE
Anesthesia Transfer of Care Note    Patient: Thiago Thacker    Procedure(s) Performed: Procedure(s) (LRB):  COLONOSCOPY (N/A)    Patient location: PACU    Anesthesia Type: MAC    Transport from OR: Transported from OR on room air with adequate spontaneous ventilation    Post pain: adequate analgesia    Post assessment: no apparent anesthetic complications    Post vital signs: stable    Level of consciousness: awake, alert and oriented    Nausea/Vomiting: no nausea/vomiting    Complications: none    Transfer of care protocol was followed      Last vitals:   Visit Vitals  BP (!) 146/79   Pulse 67   Temp 36.7 °C (98.1 °F)   Resp 18   SpO2 98%

## 2023-10-09 NOTE — PLAN OF CARE
Pt in preop bay 5, VSS and IV inserted. Pt denies any open wounds on body or the use of any weight loss injections. Pt needs an updated H&P, admit order, and consents, otherwise ready to roll.

## 2023-10-09 NOTE — [employer]ATION PATIENT INSTRUCTIONS
Discharge Summary/Instructions after an Endoscopic Procedure  Patient Name: Thiago Thacker  Patient MRN: [id number]  Patient YOB: 1950 Monday, October 9, 2023  Miguel Richardson MD  Dear patient,  As a result of recent federal legislation (The Federal Cures Act), you may   receive lab or pathology results from your procedure in your [facility]   account before your physician is able to contact you. Your physician or   their representative will relay the results to you with their   recommendations at their soonest availability.  Thank you,  RESTRICTIONS:  During your procedure today, you received medications for sedation.  These   medications may affect your judgment, balance and coordination.  Therefore,   for 24 hours, you have the following restrictions:   - DO NOT drive a car, operate machinery, make legal/financial decisions,   sign important papers or drink alcohol.    ACTIVITY:  Today: no heavy lifting, straining or running due to procedural   sedation/anesthesia.  The following day: return to full activity including work.  DIET:  Eat and drink normally unless instructed otherwise.     TREATMENT FOR COMMON SIDE EFFECTS:  - Mild abdominal pain, nausea, belching, bloating or excessive gas:  rest,   eat lightly and use a heating pad.  - Sore Throat: treat with throat lozenges and/or gargle with warm salt   water.  - Because air was used during the procedure, expelling large amounts of air   from your rectum or belching is normal.  - If a bowel prep was taken, you may not have a bowel movement for 1-3 days.    This is normal.  SYMPTOMS TO WATCH FOR AND REPORT TO YOUR PHYSICIAN:  1. Abdominal pain or bloating, other than gas cramps.  2. Chest pain.  3. Back pain.  4. Signs of infection such as: chills or fever occurring within 24 hours   after the procedure.  5. Rectal bleeding, which would show as bright red, maroon, or black stools.   (A tablespoon of blood from the rectum is not serious, especially if    hemorrhoids are present.)  6. Vomiting.  7. Weakness or dizziness.  GO DIRECTLY TO THE NEAREST EMERGENCY ROOM IF YOU HAVE ANY OF THE FOLLOWING:      Difficulty breathing              Chills and/or fever over 101 F   Persistent vomiting and/or vomiting blood   Severe abdominal pain   Severe chest pain   Black, tarry stools   Bleeding- more than one tablespoon   Any other symptom or condition that you feel may need urgent attention  Your doctor recommends these additional instructions:  If any biopsies were taken, your doctors clinic will contact you in 1 to 2   weeks with any results.  - Patient has a contact number available for emergencies.  The signs and   symptoms of potential delayed complications were discussed with the   patient.  Return to normal activities tomorrow.  Written discharge   instructions were provided to the patient.   - Discharge patient to home.   - Resume previous diet.   - Await pathology results.   - Repeat colonoscopy in 3 years for surveillance.   - Continue present medications.  For questions, problems or results please call your physician - Miguel Richardson MD at Work:  [phone].  [facility], EMERGENCY ROOM PHONE NUMBER: [phone]  IF A COMPLICATION OR EMERGENCY SITUATION ARISES AND YOU ARE UNABLE TO REACH   YOUR PHYSICIAN - GO DIRECTLY TO THE EMERGENCY ROOM.  Miguel Richardson MD  10/9/2023 8:17:37 AM  This report has been verified and signed electronically.  Dear patient,  As a result of recent federal legislation (The Federal Cures Act), you may   receive lab or pathology results from your procedure in your [facility]   account before your physician is able to contact you. Your physician or   their representative will relay the results to you with their   recommendations at their soonest availability.  Thank you,  [employer]ATION

## 2023-10-09 NOTE — PLAN OF CARE
Discharge instructions given and explained to patient with verbalization of understanding all instructions. Patients v/s stable, denies n/v and tolerating po fluids, rates pain level 0/10, IV removed, and transportation system ([facility]) reached and available for patient discharge home.

## 2023-10-15 LAB
FINAL PATHOLOGIC DIAGNOSIS: NORMAL
GROSS: NORMAL
Lab: NORMAL

## 2023-10-17 ENCOUNTER — TELEPHONE (OUTPATIENT)
Dept: SPORTS MEDICINE | Facility: CLINIC | Age: 73
End: 2023-10-17
Payer: COMMERCIAL

## 2023-10-17 ENCOUNTER — OFFICE VISIT (OUTPATIENT)
Dept: SPORTS MEDICINE | Facility: CLINIC | Age: 73
End: 2023-10-17
Payer: COMMERCIAL

## 2023-10-17 VITALS
HEART RATE: 57 BPM | HEIGHT: 71 IN | BODY MASS INDEX: 26.04 KG/M2 | WEIGHT: 186 LBS | DIASTOLIC BLOOD PRESSURE: 67 MMHG | SYSTOLIC BLOOD PRESSURE: 128 MMHG

## 2023-10-17 DIAGNOSIS — M17.12 PRIMARY OSTEOARTHRITIS OF LEFT KNEE: Primary | ICD-10-CM

## 2023-10-17 DIAGNOSIS — S83.232D COMPLEX TEAR OF MEDIAL MENISCUS OF LEFT KNEE, UNSPECIFIED WHETHER OLD OR CURRENT TEAR, SUBSEQUENT ENCOUNTER: ICD-10-CM

## 2023-10-17 PROCEDURE — 1159F MED LIST DOCD IN RCRD: CPT | Mod: CPTII,S$GLB,, | Performed by: ORTHOPAEDIC SURGERY

## 2023-10-17 PROCEDURE — 99999 PR PBB SHADOW E&M-EST. PATIENT-LVL IV: CPT | Mod: PBBFAC,,, | Performed by: ORTHOPAEDIC SURGERY

## 2023-10-17 PROCEDURE — 1101F PR PT FALLS ASSESS DOC 0-1 FALLS W/OUT INJ PAST YR: ICD-10-PCS | Mod: CPTII,S$GLB,, | Performed by: ORTHOPAEDIC SURGERY

## 2023-10-17 PROCEDURE — 1159F PR MEDICATION LIST DOCUMENTED IN MEDICAL RECORD: ICD-10-PCS | Mod: CPTII,S$GLB,, | Performed by: ORTHOPAEDIC SURGERY

## 2023-10-17 PROCEDURE — 1125F AMNT PAIN NOTED PAIN PRSNT: CPT | Mod: CPTII,S$GLB,, | Performed by: ORTHOPAEDIC SURGERY

## 2023-10-17 PROCEDURE — 3074F SYST BP LT 130 MM HG: CPT | Mod: CPTII,S$GLB,, | Performed by: ORTHOPAEDIC SURGERY

## 2023-10-17 PROCEDURE — 3008F BODY MASS INDEX DOCD: CPT | Mod: CPTII,S$GLB,, | Performed by: ORTHOPAEDIC SURGERY

## 2023-10-17 PROCEDURE — 3288F PR FALLS RISK ASSESSMENT DOCUMENTED: ICD-10-PCS | Mod: CPTII,S$GLB,, | Performed by: ORTHOPAEDIC SURGERY

## 2023-10-17 PROCEDURE — 3074F PR MOST RECENT SYSTOLIC BLOOD PRESSURE < 130 MM HG: ICD-10-PCS | Mod: CPTII,S$GLB,, | Performed by: ORTHOPAEDIC SURGERY

## 2023-10-17 PROCEDURE — 3078F PR MOST RECENT DIASTOLIC BLOOD PRESSURE < 80 MM HG: ICD-10-PCS | Mod: CPTII,S$GLB,, | Performed by: ORTHOPAEDIC SURGERY

## 2023-10-17 PROCEDURE — 99214 PR OFFICE/OUTPT VISIT, EST, LEVL IV, 30-39 MIN: ICD-10-PCS | Mod: S$GLB,,, | Performed by: ORTHOPAEDIC SURGERY

## 2023-10-17 PROCEDURE — 1125F PR PAIN SEVERITY QUANTIFIED, PAIN PRESENT: ICD-10-PCS | Mod: CPTII,S$GLB,, | Performed by: ORTHOPAEDIC SURGERY

## 2023-10-17 PROCEDURE — 1101F PT FALLS ASSESS-DOCD LE1/YR: CPT | Mod: CPTII,S$GLB,, | Performed by: ORTHOPAEDIC SURGERY

## 2023-10-17 PROCEDURE — 3288F FALL RISK ASSESSMENT DOCD: CPT | Mod: CPTII,S$GLB,, | Performed by: ORTHOPAEDIC SURGERY

## 2023-10-17 PROCEDURE — 99214 OFFICE O/P EST MOD 30 MIN: CPT | Mod: S$GLB,,, | Performed by: ORTHOPAEDIC SURGERY

## 2023-10-17 PROCEDURE — 3008F PR BODY MASS INDEX (BMI) DOCUMENTED: ICD-10-PCS | Mod: CPTII,S$GLB,, | Performed by: ORTHOPAEDIC SURGERY

## 2023-10-17 PROCEDURE — 3078F DIAST BP <80 MM HG: CPT | Mod: CPTII,S$GLB,, | Performed by: ORTHOPAEDIC SURGERY

## 2023-10-17 PROCEDURE — 99999 PR PBB SHADOW E&M-EST. PATIENT-LVL IV: ICD-10-PCS | Mod: PBBFAC,,, | Performed by: ORTHOPAEDIC SURGERY

## 2023-10-17 NOTE — PROGRESS NOTES
CC: Left knee pain    Thiago returns back to clinic to further discuss treatment options for left knee pain.  He has a known medial meniscus tear with underlying chondromalacia.  Areas of full-thickness cartilage loss within the patellofemoral compartment, partial-thickness within the medial compartment.  We have previously discussed the option of arthroscopic partial medial meniscectomy and chondroplasty.  He is still recovering from a recent hernia procedure like to avoid surgery on his left knee if possible.  He does have several questions today regarding biologic options.    Prior Hx 9/11/23:  Patient returns to clinic for follow up of left knee pain. He is well known to me and I have managed both of his knee chondromalacia and osteoarthritis recently. He finished bilateral Euflexxa injections on 7/24/23. He states that the injections significantly relieved his pain in his right knee to the point where he is asymptomatic in that knee, but that it did not work as well for the left knee. He went to the zoo on Saturday and walked longer than usual, then on Sunday he noticed his left knee was swollen and painful. Most of his pain is located medially. He endorses swelling of the knee and lower leg after activity. Denies mechanical symptoms. Here to discuss further treatment options.     Prior Hx 6/26/23:  73 y.o. Patient returns with a new complaint of left knee pain.  Localized over the medial compartment primarily.  Bothers him intermittently with activity.  He begin running again on a treadmill and noticed some intermittent mechanical symptoms with pain.  Similar to what he experienced on the right side previously.  I saw him previously for right knee DJD.  Responded very well to prior Visco Euflexxa series.  He does report that he is taking a few medications designed for longevity.  These have been ordered online and not prescribed by a physician from what I can tell.  No prior injections or surgery for the left  knee.  Treatment to this point has included oral medication, self-directed therapy, and rest.  No ipsilateral groin or hip pain.  No back or radicular symptoms.  States the right knee is doing well at this time.    REVIEW OF SYSTEMS:   Constitution: Negative. Negative for chills, fever and night sweats.    Hematologic/Lymphatic: Negative for bleeding problem. Does not bruise/bleed easily.   Skin: Negative for dry skin, itching and rash.   Musculoskeletal: Negative for falls. Positive for left knee pain     All other review of symptoms were reviewed and found to be noncontributory.     PAST MEDICAL HISTORY:   Past Medical History:   Diagnosis Date    Coronary atherosclerosis     Headache     Hypercholesterolemia     Myocardial infarction 2011     PAST SURGICAL HISTORY:   Past Surgical History:   Procedure Laterality Date    BLEPHAROPLASTY      CATARACT EXTRACTION W/  INTRAOCULAR LENS IMPLANT Right 6/1/2023    Procedure: EXTRACTION, CATARACT, WITH IOL INSERTION;  Surgeon: Nessa Romo MD;  Location: [facility] OR;  Service: Ophthalmology;  Laterality: Right;    CATARACT EXTRACTION W/  INTRAOCULAR LENS IMPLANT Left 8/16/2023    Procedure: EXTRACTION, CATARACT, WITH IOL INSERTION;  Surgeon: Nessa Romo MD;  Location: [facility] OR;  Service: Ophthalmology;  Laterality: Left;    COLONOSCOPY N/A 10/9/2023    Procedure: COLONOSCOPY;  Surgeon: Miguel Richardson MD;  Location: [facility] ENDOSCOPY;  Service: Endoscopy;  Laterality: N/A;  instructions sent to patient portal.TB  referral: Dr. Reyes  pre call attempted, LV and sent portal message -CE    COMPLETE LASER PHOTOVAPORIZATION OF PROSTATE      CORONARY STENT PLACEMENT  2011    INGUINAL HERNIA REPAIR      ROBOT-ASSISTED LAPAROSCOPIC REPAIR OF INGUINAL HERNIA USING DA YRIS XI N/A 8/21/2023    Procedure: XI ROBOTIC REPAIR, HERNIA, INGUINAL, RIGHT (recurrent) w/ mesh;  Surgeon: Hussein Sy Jr., MD;  Location: [facility] OR [address] [place] [address];  Service: General;  Laterality: N/A;     vein       FAMILY HISTORY:   Family History   Problem Relation Age of Onset    Colon cancer Mother 80    Heart attack Father 90     SOCIAL HISTORY:   Social History     Socioeconomic History    Marital status: Single   Tobacco Use    Smoking status: Never    Smokeless tobacco: Never   Substance and Sexual Activity    Alcohol use: Yes     Comment: occasionally    Drug use: Never    Sexual activity: Not Currently     Partners: Female     Social Determinants of Health     Financial Resource Strain: Low Risk  (8/4/2023)    Overall Financial Resource Strain (CARDIA)     Difficulty of Paying Living Expenses: Not hard at all   Food Insecurity: No Food Insecurity (8/4/2023)    Hunger Vital Sign     Worried About Running Out of Food in the Last Year: Never true     Ran Out of Food in the Last Year: Never true   Transportation Needs: No Transportation Needs (8/4/2023)    PRAPARE - Transportation     Lack of Transportation (Medical): No     Lack of Transportation (Non-Medical): No   Physical Activity: Sufficiently Active (8/4/2023)    Exercise Vital Sign     Days of Exercise per Week: 7 days     Minutes of Exercise per Session: 40 min   Social Connections: Moderately Isolated (8/4/2023)    Social Connection and Isolation Panel [NHANES]     Frequency of Communication with Friends and Family: More than three times a week     Frequency of Social Gatherings with Friends and Family: Once a week     Attends [employer] Services: Never     Active Member of Clubs or Organizations: Yes     Attends Club or Organization Meetings: Never     Marital Status: Never    Housing Stability: Unknown (8/4/2023)    Housing Stability Vital Sign     Unable to Pay for Housing in the Last Year: No     Unstable Housing in the Last Year: No     MEDICATIONS:     Current Outpatient Medications:     acarbose (PRECOSE) 25 MG Tab, , Disp: , Rfl:     aspirin (ECOTRIN) 81 MG EC tablet, Take 81 mg by mouth once daily., Disp: , Rfl:     atorvastatin  (LIPITOR) 40 MG tablet, Take 1 tablet (40 mg total) by mouth once daily., Disp: 90 tablet, Rfl: 1    erythromycin with ethanoL (EMGEL) 2 % gel, Apply topically 2 (two) times daily. Prn breakouts of posterior neck, Disp: 30 g, Rfl: 3    finasteride (PROSCAR) 5 mg tablet, Take 1 tablet (5 mg total) by mouth once daily., Disp: 90 tablet, Rfl: 1    GREEN TEA EXTRACT ORAL, Take 1 capsule by mouth once daily., Disp: , Rfl:     levothyroxine (SYNTHROID, LEVOTHROID) 175 MCG tablet, TAKE 1 TABLET (175 MCG TOTAL) BY MOUTH BEFORE BREAKFAST., Disp: 90 tablet, Rfl: 3    meloxicam (MOBIC) 15 MG tablet, Take 1 tablet by mouth once daily, Disp: 30 tablet, Rfl: 0    multivitamin (THERAGRAN) per tablet, Take 1 tablet by mouth once daily., Disp: , Rfl:     sirolimus (RAPAMUNE) 1 MG Tab, Take 10 mg by mouth once a week. Sat, Disp: , Rfl:     tadalafiL (CIALIS) 5 MG tablet, Take 1 tablet (5 mg total) by mouth daily as needed for Erectile Dysfunction., Disp: 90 tablet, Rfl: 0    triamcinolone acetonide 0.1% (KENALOG) 0.1 % ointment, Apply topically 2 (two) times daily., Disp: 15 g, Rfl: 0    turmeric (CURCUMIN MISC), by Misc.(Non-Drug; Combo Route) route., Disp: , Rfl:     acetaminophen (TYLENOL) 500 MG tablet, Take 1 tablet (500 mg total) by mouth every 6 (six) hours as needed for Pain (alternate with ibuprofen)., Disp: , Rfl: 0    alpha lipoic acid 200 mg Tab, , Disp: , Rfl:     ibuprofen (ADVIL,MOTRIN) 400 MG tablet, Take 1 tablet (400 mg total) by mouth every 6 (six) hours as needed for Other (pain). Alternate with tylenol, Disp: , Rfl:     omega-3/dha/epa/fish oil (OMEGA-3 FISH OIL ORAL), Take 4 g by mouth once daily., Disp: , Rfl:     oxyCODONE (ROXICODONE) 5 MG immediate release tablet, Take 1 tablet (5 mg total) by mouth every 4 (four) hours as needed for Pain (if pain is not relieved by alternating tylenol and ibuprofen)., Disp: 8 tablet, Rfl: 0    prednisolon/gatiflox/bromfenac (PREDNISOL ACE-GATIFLOX-BROMFEN) 1-0.5-0.075 %  "DrpS, Apply 1 drop to eye 3 (three) times daily., Disp: 5 mL, Rfl: 3    QUERCETIN DIHYDRATE, BULK, MISC, 1,000 mg by Misc.(Non-Drug; Combo Route) route once daily. Quercetin, Disp: , Rfl:     RESVERATROL ORAL, Take 200 mg by mouth. Resveratrol (Longevinex formulation), Disp: , Rfl:     taurine 1,000 mg Cap, Take 6 g by mouth once daily., Disp: , Rfl:   No current facility-administered medications for this visit.    Facility-Administered Medications Ordered in Other Visits:     balanced salt irrigation intra-ocular solution 1 drop, 1 drop, Right Eye, On Call Procedure, Nessa Romo MD    balanced salt irrigation intra-ocular solution 1 drop, 1 drop, Left Eye, On Call Procedure, Nessa Romo MD    phenylephrine HCL 10% ophthalmic solution 1 drop, 1 drop, Right Eye, PRN, Nessa Room MD    phenylephrine HCL 10% ophthalmic solution 1 drop, 1 drop, Left Eye, PRN, Nessa Romo MD    proparacaine 0.5 % ophthalmic solution 1 drop, 1 drop, Left Eye, PRN, Nessa Romo MD    sodium chloride 0.9% flush 2 mL, 2 mL, Intravenous, PRN, Nessa Romo MD    sodium chloride 0.9% flush 2 mL, 2 mL, Intravenous, PRN, Nessa Romo MD    TETRAcaine HCl (PF) 0.5 % Drop 1 drop, 1 drop, Left Eye, PRN, Nessa Romo MD    ALLERGIES:   Review of patient's allergies indicates:   Allergen Reactions    Celebrex [celecoxib] Rash      PHYSICAL EXAMINATION:  /67   Pulse (!) 57   Ht 5' 11" (1.803 m)   Wt 84.4 kg (186 lb)   BMI 25.94 kg/m²   General: Well-developed well-nourished 73 y.o. malein no acute distress   Cardiovascular: Regular rhythm by palpation of distal pulse, normal color and temperature, no concerning varicosities on symptomatic side   Lungs: No labored breathing or wheezing appreciated   Neuro: Alert and oriented ×3   Psychiatric: well oriented to person, place and time, demonstrates normal mood and affect   Skin: No rashes, lesions or ulcers, normal temperature, turgor, and " texture on involved extremity    Ortho/SPM Exam  Examination of the left knee demonstrates standing varus alignment.  Pain over the medial joint line to direct palpation.  Pain medially with varus load.  Mildly positive patellar crepitus and grind.  Stable to varus and valgus stress.  Trace effusion.  I do think he has developed a medial parameniscal cyst.  No significant peripheral vascular disease.  2+ dorsalis pedis pulse.  No pain with passive internal and external rotation of the hip. Stinchfield negative    IMAGING: MRI 9/7/23:  1. Horizontal flap tear body segment/posterior horn medial meniscus with mild medial displacement of the inferior flap and suspected reactive subchondral edema of the adjacent tibial plateau.  2. Patellofemoral and medial tibiofemoral osteoarthritis with chondral loss as detailed above.  3. Distal semimembranosus and pes anserine bursitis with prominent surrounding soft tissue edema.  4. Joint effusion with synovitis.    Prior left knee standing radiographs show early degenerative changes, tricompartmental.    ASSESSMENT:      ICD-10-CM ICD-9-CM   1. Primary osteoarthritis of left knee  M17.12 715.16   2. Complex tear of medial meniscus of left knee, unspecified whether old or current tear, subsequent encounter  S83.232D V58.89     836.0       PLAN:     Options discussed with the patient.  We again discussed the details of arthroscopy with partial medial meniscectomy and chondroplasty.  I do think this would help him is I do think there is some secondary abrasive wear from his flipped undersurface component of his medial meniscus tear.  This would be a limited goals option however with his underlying chondral disease.  Again he would like to avoid surgery now if possible.  We discussed alternative biologic treatment options.  I do think a leukocyte poor PRP is a reasonable option and he would like to pursue that.  I will set him up with my colleague Dr. Tip Mcdonough to get this  done.  I am happy to see him back for the knee in the future should he decide to proceed with the arthroscopy.  We would need to get repeat x-rays at some point however as I do think this knee is becoming more and more arthritic.  He will hold off on taking any further Mobic at this time in preparation for his PRP injection.    Procedures

## 2023-10-17 NOTE — TELEPHONE ENCOUNTER
Attempted to contact pt. Left voicemail, called to schedule virtual appt for L knee PRP discussion. Asked pt to return call to clinic at [phone]. MyChart sent.

## 2023-10-17 NOTE — TELEPHONE ENCOUNTER
Attempted to contact pt. Left voicemail, called to schedule virtual appt for L knee PRP discussion. Asked pt to return call to clinic at [phone].

## 2023-10-17 NOTE — TELEPHONE ENCOUNTER
----- Message from Vanessa Stallworth sent at 10/17/2023  2:49 PM CDT -----  Regarding: pt advice  Contact: [phone]  ..Pt is returning call from staff. Pls call to discuss. Asking for Breana.

## 2023-10-18 ENCOUNTER — OFFICE VISIT (OUTPATIENT)
Dept: SPORTS MEDICINE | Facility: CLINIC | Age: 73
End: 2023-10-18
Payer: COMMERCIAL

## 2023-10-18 ENCOUNTER — TELEPHONE (OUTPATIENT)
Dept: SPORTS MEDICINE | Facility: CLINIC | Age: 73
End: 2023-10-18
Payer: COMMERCIAL

## 2023-10-18 DIAGNOSIS — M17.12 PRIMARY OSTEOARTHRITIS OF LEFT KNEE: Primary | ICD-10-CM

## 2023-10-18 PROCEDURE — 1160F PR REVIEW ALL MEDS BY PRESCRIBER/CLIN PHARMACIST DOCUMENTED: ICD-10-PCS | Mod: CPTII,95,, | Performed by: FAMILY MEDICINE

## 2023-10-18 PROCEDURE — 1159F PR MEDICATION LIST DOCUMENTED IN MEDICAL RECORD: ICD-10-PCS | Mod: CPTII,95,, | Performed by: FAMILY MEDICINE

## 2023-10-18 PROCEDURE — 99214 OFFICE O/P EST MOD 30 MIN: CPT | Mod: 95,,, | Performed by: FAMILY MEDICINE

## 2023-10-18 PROCEDURE — 1159F MED LIST DOCD IN RCRD: CPT | Mod: CPTII,95,, | Performed by: FAMILY MEDICINE

## 2023-10-18 PROCEDURE — 99214 PR OFFICE/OUTPT VISIT, EST, LEVL IV, 30-39 MIN: ICD-10-PCS | Mod: 95,,, | Performed by: FAMILY MEDICINE

## 2023-10-18 PROCEDURE — 1160F RVW MEDS BY RX/DR IN RCRD: CPT | Mod: CPTII,95,, | Performed by: FAMILY MEDICINE

## 2023-10-18 NOTE — PROGRESS NOTES
Number 1/1    PRE-PROCEDURE DIAGNOSIS and PATIENT INDICATIONS  Thiago Thacker, a 73 y.o. male, presents today with:   Kellgren-Shabbir Grade III osteoarthritis of knee, jamar ant+med compartment, left      PROCEDURE PERFORMED  Platelet rich plasma (PRP) injection performed using ultrasound guidance for exact placement of orthobiologic at pathologic site(s)    POTENTIAL RISKS AND BENEFITS  We discussed that this is generally a well-tolerated and safe procedure. Even so, as with any invasive medical procedure, there may be associated rare risks. These risks were discussed in detail. The patient agreed to proceed with this procedure. Please see the electronic medical record for full written and signed patient consent documentation.    DESCRIPTION OF PROCEDURE  A) Procedural time out  A procedural time out was performed, including verification of patient ID, procedure to be performed, site and side/laterality, availability of patient information, review of safety issues, and the patients agreement and consent.     B) Phlebotomy and autograft platelet harvest, and PRP preparation  Sterile technique was used to phlebotomize 120mL of the patients blood from a peripheral vein. This blood was  into density-divided layers using an [employer] centrifuge. The layer of leukocyte poor hematocrit 2%) PRP, a volume drawn up to 6mL (with PPP, if needed) per site/joint, was placed into a sterile syringe in preparation for injection.     C) Platelet rich plasma delivery to pathologic site  Injection site and laterality: left KNEE  Using 1) physical examination and 2) musculoskeletal ultrasound and 3) recent MRI, the anatomy was visualized and the diseased tissue was identified and confirmed. The procedure site was marked with a skin marker. The patient was placed in position maximizing 1) physician access to pathologic tissue and 2) patient comfort. The skin about the area was sterilized with ChloraPrep (2%w/v CHG, 70%  IPA). The site of needle insertion was anesthetized using vapocoolant. Under ultrasound guidance, the needle was advanced to the site of tissue pathology, and the injectate was deposited under direct visualization.    POST-PROCEDURE DIAGNOSIS  Kellgren-Shabbir Grade III osteoarthritis of knee, jamar ant+med compartment, left    POST-PROCEDURE CARE  Following the procedure, a sterile bandage was placed over the injection site.  A procedure after care instructions (ACI) handout was provided to the patient.    Complications: none     Estimated blood loss from injection procedure: none     Joint aspirate volume, if required: 0mL     DISPOSITION  The patient tolerated the procedure well and there were no immediate complications. The patient was instructed to call the clinic immediately for any mild to moderate adverse side effects, or to call 911 in the event of an emergency.        Description of ultrasound utilization for needle / probe guidance  Ultrasound guidance was used for needle / probe localization. Images (and videos, if appropriate) were saved and stored for documentation. Dynamic visualization of the needle / probe was continuous throughout the procedure.    0232T

## 2023-10-18 NOTE — PROGRESS NOTES
Telemedicine visit / virtual visit with synchronous audio and video    The patient location is: seated and safe  The chief complaint leading to todays visit is:   [part of the body] pain , [right, left, bilateral] , [other]  e.g. knee pain, left, PRP discussion    As far as I am able to assess, the patient is not in an unsafe location (e.g. driving a vehicle) for this appointment.    Face-to-face time with patient: 15 minutes  30 minutes of total time were spent on this encounter, including some or all of:  -engaging the patient face-to-face  -preparing to see the patient (e.g. reviewing the patient chart and patient imaging studies)  -documenting clinical information in the electronic or other health record  -coordinating care      Physical Examination:  General: In no acute distress, well developed, well nourished, no diaphoresis  Eyes: EOM full and smooth, no eye redness or discharge  HENT: normocephalic and atraumatic, neck supple, trachea midline, no nasal discharge, no external ear redness or discharge  Lungs: respirations unlabored, no conversational dyspnea  Neuro: AAOx3, CN2-12 grossly intact  Skin: No obvious rashes on face or neck  Psychiatric: cooperative, pleasant, mood and affect appropriate for age    ASSESSMENT & PLAN  Assessment  #1 Kellgren-Shabbir Grade III osteoarthritis of knee, jamar ant+med compartment, left    No evidence of neurologic pathology  No evidence of vascular pathology    Imaging studies reviewed:   X-ray knee, bilat 23.06    Plan  We discussed the importance of appropriate diet, weight, and regular exercise    We discussed options including    Watchful waiting / relative rest    Physical therapy    Injection therapy Prpi  Single vs. Series  [place]nee  left   Consultation    The patient chooses As above   x = prescribed  CSI = corticosteroid injection  VSI = viscosupplement injection  PRPI = platelet rich plasma injection  ia = intra articular  R = right  L = left  B = bilateral    nfSx = surgical consultation was recommended, but patient is not interested in consultation at this time    Physical Therapy        Formal (fPT), @ [facility] facility    Formal (fPT), @ [facility] facility        Homegoing (hgPT), per concurrent fPT recommendations    Homegoing (hgPT), per prior fPT recommendations    Homegoing (hgPT), handout provided        w/  (atPT)    [blank] = not prescribed  x = prescribed  b = prescribed, and begin as indicated  t = continue as indicated  r = prescribed, and restart as indicated  p = completed prior as indicated  hs = prescribed, and with high school   col = prescribed, and with college or university   nfPT = physical therapy was recommended, but patient is not interested in PT at this time    Activity (e.g. sports, work) restrictions    [blank] = as tolerated  pt = per physical therapist  at = per   NWB = non weight bearing on affected lower extremity, with crutches assistance for ambulation    Bracing    [blank] = not prescribed  r = recommended, but not fit with at todays visit  f = prescribed and fit with at todays visit  t = continue as indicated  d = d/c  p = as needed  rare = use on rare, as-needed basis; advised against chronic use    Pain management    [blank] = No prescription necessary. A handout detailing dosing of appropriate   over-the-counter musculoskeletal analgesics was made available to the patient.   m = meloxicam x 14 days  mp = 14 day course of meloxicam prescribed prior    Follow up Per pt   [blank] = as needed  [number] = in [number] weeks  CSI = for corticosteroid injection  VSI = for viscosupplement injection or injection series  PRP = for platelet rich plasma injection or injection series  MRI = after MRI imaging  ns = should surgical options be deferred (no surgery)  o = appointment offered, deferred by patient    Should symptoms worsen or fail to resolve, consider    Revisiting the above  options and / or Scope w/ #4  before  TKA     Vocation:

## 2023-10-20 ENCOUNTER — TELEPHONE (OUTPATIENT)
Dept: GASTROENTEROLOGY | Facility: CLINIC | Age: 73
End: 2023-10-20
Payer: COMMERCIAL

## 2023-10-20 NOTE — TELEPHONE ENCOUNTER
----- Message from Miguel Richardson MD sent at 10/16/2023  4:24 PM CDT -----  Please call and notify patient, the colon polyp was benign.

## 2023-10-24 ENCOUNTER — OFFICE VISIT (OUTPATIENT)
Dept: SPORTS MEDICINE | Facility: CLINIC | Age: 73
End: 2023-10-24
Payer: COMMERCIAL

## 2023-10-24 VITALS
HEIGHT: 71 IN | HEART RATE: 62 BPM | SYSTOLIC BLOOD PRESSURE: 120 MMHG | TEMPERATURE: 98 F | BODY MASS INDEX: 26.04 KG/M2 | WEIGHT: 186 LBS | DIASTOLIC BLOOD PRESSURE: 79 MMHG

## 2023-10-24 DIAGNOSIS — G89.29 CHRONIC PAIN OF LEFT KNEE: ICD-10-CM

## 2023-10-24 DIAGNOSIS — M17.12 PRIMARY OSTEOARTHRITIS OF LEFT KNEE: Primary | ICD-10-CM

## 2023-10-24 DIAGNOSIS — M25.562 CHRONIC PAIN OF LEFT KNEE: ICD-10-CM

## 2023-10-24 PROCEDURE — 0232T NJX PLATELET PLASMA: CPT | Mod: CSM,,, | Performed by: FAMILY MEDICINE

## 2023-10-24 PROCEDURE — 99499 UNLISTED E&M SERVICE: CPT | Mod: S$GLB,,, | Performed by: FAMILY MEDICINE

## 2023-10-24 PROCEDURE — 99499 NO LOS: ICD-10-PCS | Mod: S$GLB,,, | Performed by: FAMILY MEDICINE

## 2023-10-24 PROCEDURE — 0232T PR INJECT PLATELET PLASMA W/IMG HARVEST/PREPARATOIN: ICD-10-PCS | Mod: CSM,,, | Performed by: FAMILY MEDICINE

## 2023-10-24 PROCEDURE — 99999 PR PBB SHADOW E&M-EST. PATIENT-LVL V: CPT | Mod: PBBFAC,,, | Performed by: FAMILY MEDICINE

## 2023-10-24 PROCEDURE — 99999 PR PBB SHADOW E&M-EST. PATIENT-LVL V: ICD-10-PCS | Mod: PBBFAC,,, | Performed by: FAMILY MEDICINE

## 2023-11-02 ENCOUNTER — TELEPHONE (OUTPATIENT)
Dept: SPORTS MEDICINE | Facility: CLINIC | Age: 73
End: 2023-11-02
Payer: COMMERCIAL

## 2023-11-02 NOTE — TELEPHONE ENCOUNTER
Spoke c pt. Scheduled L knee PRP injection. Confirmed appt date, time, location. Pt will call c additional questions/concerns in interim. Pt expressed understanding & was thankful.

## 2023-11-02 NOTE — TELEPHONE ENCOUNTER
----- Message from Nay Hernandez sent at 11/2/2023  3:44 PM CDT -----  Regarding: Appt Access  Contact: pt [phone]  Pt calling to schedule injection appt. Pls call

## 2023-11-06 NOTE — PROGRESS NOTES
Number 2/2    PRE-PROCEDURE DIAGNOSIS and PATIENT INDICATIONS  Thiago Thacker, a 73 y.o. male, presents today with:   Kellgren-Shabbir Grade III osteoarthritis of knee, jamar ant+med compartment, left      PROCEDURE PERFORMED  Platelet rich plasma (PRP) injection performed using ultrasound guidance for exact placement of orthobiologic at pathologic site(s)    POTENTIAL RISKS AND BENEFITS  We discussed that this is generally a well-tolerated and safe procedure. Even so, as with any invasive medical procedure, there may be associated rare risks. These risks were discussed in detail. The patient agreed to proceed with this procedure. Please see the electronic medical record for full written and signed patient consent documentation.    DESCRIPTION OF PROCEDURE  A) Procedural time out  A procedural time out was performed, including verification of patient ID, procedure to be performed, site and side/laterality, availability of patient information, review of safety issues, and the patients agreement and consent.     B) Phlebotomy and autograft platelet harvest, and PRP preparation  Sterile technique was used to phlebotomize 120mL of the patients blood from a peripheral vein. This blood was  into density-divided layers using an [employer] centrifuge. The layer of leukocyte poor hematocrit 2%) PRP 3mL, a volume drawn up to 6mL (with PPP), was placed into a sterile syringe in preparation for injection.     C) Platelet rich plasma delivery to pathologic site  Injection site and laterality: left KNEE  Using 1) physical examination and 2) musculoskeletal ultrasound and 3) recent MRI, the anatomy was visualized and the diseased tissue was identified and confirmed. The procedure site was marked with a skin marker. The patient was placed in position maximizing 1) physician access to pathologic tissue and 2) patient comfort. The skin about the area was sterilized with ChloraPrep (2%w/v CHG, 70% IPA). The site of  needle insertion was anesthetized using vapocoolant. Under ultrasound guidance, the needle was advanced to the site of tissue pathology, and the injectate was deposited under direct visualization.    POST-PROCEDURE DIAGNOSIS  Kellgren-Shabbir Grade III osteoarthritis of knee, jamar ant+med compartment, left    POST-PROCEDURE CARE  Following the procedure, a sterile bandage was placed over the injection site.  A procedure after care instructions (ACI) handout was provided to the patient.    Complications: none     Estimated blood loss from injection procedure: none     Joint aspirate volume, if required: 0mL     DISPOSITION  The patient tolerated the procedure well and there were no immediate complications. The patient was instructed to call the clinic immediately for any mild to moderate adverse side effects, or to call 911 in the event of an emergency.        Description of ultrasound utilization for needle / probe guidance  Ultrasound guidance was used for needle / probe localization. Images (and videos, if appropriate) were saved and stored for documentation. Dynamic visualization of the needle / probe was continuous throughout the procedure.    0232T

## 2023-11-09 ENCOUNTER — OFFICE VISIT (OUTPATIENT)
Dept: SPORTS MEDICINE | Facility: CLINIC | Age: 73
End: 2023-11-09
Payer: COMMERCIAL

## 2023-11-09 VITALS — BODY MASS INDEX: 26.04 KG/M2 | TEMPERATURE: 99 F | WEIGHT: 186 LBS | HEIGHT: 71 IN

## 2023-11-09 DIAGNOSIS — M25.562 CHRONIC PAIN OF LEFT KNEE: ICD-10-CM

## 2023-11-09 DIAGNOSIS — G89.29 CHRONIC PAIN OF LEFT KNEE: ICD-10-CM

## 2023-11-09 DIAGNOSIS — M17.12 PRIMARY OSTEOARTHRITIS OF LEFT KNEE: Primary | ICD-10-CM

## 2023-11-09 PROCEDURE — 0232T NJX PLATELET PLASMA: CPT | Mod: CSM,,, | Performed by: FAMILY MEDICINE

## 2023-11-09 PROCEDURE — 99499 UNLISTED E&M SERVICE: CPT | Mod: S$GLB,,, | Performed by: FAMILY MEDICINE

## 2023-11-09 PROCEDURE — 99999 PR PBB SHADOW E&M-EST. PATIENT-LVL IV: ICD-10-PCS | Mod: PBBFAC,,, | Performed by: FAMILY MEDICINE

## 2023-11-09 PROCEDURE — 99999 PR PBB SHADOW E&M-EST. PATIENT-LVL IV: CPT | Mod: PBBFAC,,, | Performed by: FAMILY MEDICINE

## 2023-11-09 PROCEDURE — 0232T PR INJECT PLATELET PLASMA W/IMG HARVEST/PREPARATOIN: ICD-10-PCS | Mod: CSM,,, | Performed by: FAMILY MEDICINE

## 2023-11-09 PROCEDURE — 99499 NO LOS: ICD-10-PCS | Mod: S$GLB,,, | Performed by: FAMILY MEDICINE

## 2023-11-15 ENCOUNTER — LAB VISIT (OUTPATIENT)
Dept: LAB | Facility: HOSPITAL | Age: 73
End: 2023-11-15
Attending: STUDENT IN AN ORGANIZED HEALTH CARE EDUCATION/TRAINING PROGRAM
Payer: COMMERCIAL

## 2023-11-15 ENCOUNTER — OFFICE VISIT (OUTPATIENT)
Dept: PRIMARY CARE CLINIC | Facility: CLINIC | Age: 73
End: 2023-11-15
Payer: COMMERCIAL

## 2023-11-15 VITALS
BODY MASS INDEX: 26.94 KG/M2 | HEIGHT: 71 IN | SYSTOLIC BLOOD PRESSURE: 124 MMHG | WEIGHT: 192.44 LBS | DIASTOLIC BLOOD PRESSURE: 70 MMHG | HEART RATE: 62 BPM | OXYGEN SATURATION: 98 %

## 2023-11-15 DIAGNOSIS — Z12.5 PROSTATE CANCER SCREENING: ICD-10-CM

## 2023-11-15 DIAGNOSIS — D72.819 LEUKOPENIA, UNSPECIFIED TYPE: ICD-10-CM

## 2023-11-15 DIAGNOSIS — D69.6 THROMBOCYTOPENIA: ICD-10-CM

## 2023-11-15 DIAGNOSIS — Z79.899 LONG TERM CURRENT USE OF IMMUNOSUPPRESSIVE DRUG: Primary | ICD-10-CM

## 2023-11-15 DIAGNOSIS — E03.9 HYPOTHYROIDISM, UNSPECIFIED TYPE: ICD-10-CM

## 2023-11-15 DIAGNOSIS — Z80.0 FAMILY HISTORY OF COLON CANCER: ICD-10-CM

## 2023-11-15 DIAGNOSIS — I25.2 HX OF MYOCARDIAL INFARCTION: ICD-10-CM

## 2023-11-15 DIAGNOSIS — R21 RASH AND NONSPECIFIC SKIN ERUPTION: ICD-10-CM

## 2023-11-15 DIAGNOSIS — R06.83 SNORING: ICD-10-CM

## 2023-11-15 DIAGNOSIS — R35.1 BENIGN PROSTATIC HYPERPLASIA WITH NOCTURIA: ICD-10-CM

## 2023-11-15 DIAGNOSIS — Z79.899 LONG TERM CURRENT USE OF IMMUNOSUPPRESSIVE DRUG: ICD-10-CM

## 2023-11-15 DIAGNOSIS — N40.1 BENIGN PROSTATIC HYPERPLASIA WITH NOCTURIA: ICD-10-CM

## 2023-11-15 LAB
ANION GAP SERPL CALC-SCNC: 9 MMOL/L (ref 8–16)
BASOPHILS # BLD AUTO: 0.03 K/UL (ref 0–0.2)
BASOPHILS NFR BLD: 0.8 % (ref 0–1.9)
BUN SERPL-MCNC: 19 MG/DL (ref 8–23)
CALCIUM SERPL-MCNC: 9.5 MG/DL (ref 8.7–10.5)
CHLORIDE SERPL-SCNC: 106 MMOL/L (ref 95–110)
CO2 SERPL-SCNC: 27 MMOL/L (ref 23–29)
COMPLEXED PSA SERPL-MCNC: 1.1 NG/ML (ref 0–4)
CREAT SERPL-MCNC: 1 MG/DL (ref 0.5–1.4)
DIFFERENTIAL METHOD: ABNORMAL
EOSINOPHIL # BLD AUTO: 0.1 K/UL (ref 0–0.5)
EOSINOPHIL NFR BLD: 2.3 % (ref 0–8)
ERYTHROCYTE [DISTWIDTH] IN BLOOD BY AUTOMATED COUNT: 12.9 % (ref 11.5–14.5)
EST. GFR  (NO RACE VARIABLE): >60 ML/MIN/1.73 M^2
ESTIMATED AVG GLUCOSE: 111 MG/DL (ref 68–131)
GLUCOSE SERPL-MCNC: 87 MG/DL (ref 70–110)
HBA1C MFR BLD: 5.5 % (ref 4–5.6)
HCT VFR BLD AUTO: 42.7 % (ref 40–54)
HGB BLD-MCNC: 14.1 G/DL (ref 14–18)
IMM GRANULOCYTES # BLD AUTO: 0.01 K/UL (ref 0–0.04)
IMM GRANULOCYTES NFR BLD AUTO: 0.3 % (ref 0–0.5)
LYMPHOCYTES # BLD AUTO: 0.6 K/UL (ref 1–4.8)
LYMPHOCYTES NFR BLD: 18.1 % (ref 18–48)
MCH RBC QN AUTO: 30.6 PG (ref 27–31)
MCHC RBC AUTO-ENTMCNC: 33 G/DL (ref 32–36)
MCV RBC AUTO: 93 FL (ref 82–98)
MONOCYTES # BLD AUTO: 0.4 K/UL (ref 0.3–1)
MONOCYTES NFR BLD: 11.3 % (ref 4–15)
NEUTROPHILS # BLD AUTO: 2.4 K/UL (ref 1.8–7.7)
NEUTROPHILS NFR BLD: 67.2 % (ref 38–73)
NRBC BLD-RTO: 0 /100 WBC
PLATELET # BLD AUTO: 131 K/UL (ref 150–450)
PMV BLD AUTO: 11.7 FL (ref 9.2–12.9)
POTASSIUM SERPL-SCNC: 4.2 MMOL/L (ref 3.5–5.1)
RBC # BLD AUTO: 4.61 M/UL (ref 4.6–6.2)
SODIUM SERPL-SCNC: 142 MMOL/L (ref 136–145)
T4 FREE SERPL-MCNC: 0.85 NG/DL (ref 0.71–1.51)
TSH SERPL DL<=0.005 MIU/L-ACNC: 5.11 UIU/ML (ref 0.4–4)
WBC # BLD AUTO: 3.53 K/UL (ref 3.9–12.7)

## 2023-11-15 PROCEDURE — 85025 COMPLETE CBC W/AUTO DIFF WBC: CPT | Performed by: STUDENT IN AN ORGANIZED HEALTH CARE EDUCATION/TRAINING PROGRAM

## 2023-11-15 PROCEDURE — 3008F PR BODY MASS INDEX (BMI) DOCUMENTED: ICD-10-PCS | Mod: CPTII,S$GLB,, | Performed by: STUDENT IN AN ORGANIZED HEALTH CARE EDUCATION/TRAINING PROGRAM

## 2023-11-15 PROCEDURE — 3074F PR MOST RECENT SYSTOLIC BLOOD PRESSURE < 130 MM HG: ICD-10-PCS | Mod: CPTII,S$GLB,, | Performed by: STUDENT IN AN ORGANIZED HEALTH CARE EDUCATION/TRAINING PROGRAM

## 2023-11-15 PROCEDURE — 83036 HEMOGLOBIN GLYCOSYLATED A1C: CPT | Performed by: STUDENT IN AN ORGANIZED HEALTH CARE EDUCATION/TRAINING PROGRAM

## 2023-11-15 PROCEDURE — 99999 PR PBB SHADOW E&M-EST. PATIENT-LVL V: CPT | Mod: PBBFAC,,, | Performed by: STUDENT IN AN ORGANIZED HEALTH CARE EDUCATION/TRAINING PROGRAM

## 2023-11-15 PROCEDURE — 1160F PR REVIEW ALL MEDS BY PRESCRIBER/CLIN PHARMACIST DOCUMENTED: ICD-10-PCS | Mod: CPTII,S$GLB,, | Performed by: STUDENT IN AN ORGANIZED HEALTH CARE EDUCATION/TRAINING PROGRAM

## 2023-11-15 PROCEDURE — 1160F RVW MEDS BY RX/DR IN RCRD: CPT | Mod: CPTII,S$GLB,, | Performed by: STUDENT IN AN ORGANIZED HEALTH CARE EDUCATION/TRAINING PROGRAM

## 2023-11-15 PROCEDURE — 3288F PR FALLS RISK ASSESSMENT DOCUMENTED: ICD-10-PCS | Mod: CPTII,S$GLB,, | Performed by: STUDENT IN AN ORGANIZED HEALTH CARE EDUCATION/TRAINING PROGRAM

## 2023-11-15 PROCEDURE — 1101F PT FALLS ASSESS-DOCD LE1/YR: CPT | Mod: CPTII,S$GLB,, | Performed by: STUDENT IN AN ORGANIZED HEALTH CARE EDUCATION/TRAINING PROGRAM

## 2023-11-15 PROCEDURE — 99999 PR PBB SHADOW E&M-EST. PATIENT-LVL V: ICD-10-PCS | Mod: PBBFAC,,, | Performed by: STUDENT IN AN ORGANIZED HEALTH CARE EDUCATION/TRAINING PROGRAM

## 2023-11-15 PROCEDURE — 3288F FALL RISK ASSESSMENT DOCD: CPT | Mod: CPTII,S$GLB,, | Performed by: STUDENT IN AN ORGANIZED HEALTH CARE EDUCATION/TRAINING PROGRAM

## 2023-11-15 PROCEDURE — 1101F PR PT FALLS ASSESS DOC 0-1 FALLS W/OUT INJ PAST YR: ICD-10-PCS | Mod: CPTII,S$GLB,, | Performed by: STUDENT IN AN ORGANIZED HEALTH CARE EDUCATION/TRAINING PROGRAM

## 2023-11-15 PROCEDURE — 1126F PR PAIN SEVERITY QUANTIFIED, NO PAIN PRESENT: ICD-10-PCS | Mod: CPTII,S$GLB,, | Performed by: STUDENT IN AN ORGANIZED HEALTH CARE EDUCATION/TRAINING PROGRAM

## 2023-11-15 PROCEDURE — 1159F MED LIST DOCD IN RCRD: CPT | Mod: CPTII,S$GLB,, | Performed by: STUDENT IN AN ORGANIZED HEALTH CARE EDUCATION/TRAINING PROGRAM

## 2023-11-15 PROCEDURE — 36415 COLL VENOUS BLD VENIPUNCTURE: CPT | Mod: PN | Performed by: STUDENT IN AN ORGANIZED HEALTH CARE EDUCATION/TRAINING PROGRAM

## 2023-11-15 PROCEDURE — 84439 ASSAY OF FREE THYROXINE: CPT | Performed by: STUDENT IN AN ORGANIZED HEALTH CARE EDUCATION/TRAINING PROGRAM

## 2023-11-15 PROCEDURE — 99214 OFFICE O/P EST MOD 30 MIN: CPT | Mod: S$GLB,,, | Performed by: STUDENT IN AN ORGANIZED HEALTH CARE EDUCATION/TRAINING PROGRAM

## 2023-11-15 PROCEDURE — 84443 ASSAY THYROID STIM HORMONE: CPT | Performed by: STUDENT IN AN ORGANIZED HEALTH CARE EDUCATION/TRAINING PROGRAM

## 2023-11-15 PROCEDURE — 1159F PR MEDICATION LIST DOCUMENTED IN MEDICAL RECORD: ICD-10-PCS | Mod: CPTII,S$GLB,, | Performed by: STUDENT IN AN ORGANIZED HEALTH CARE EDUCATION/TRAINING PROGRAM

## 2023-11-15 PROCEDURE — 3078F PR MOST RECENT DIASTOLIC BLOOD PRESSURE < 80 MM HG: ICD-10-PCS | Mod: CPTII,S$GLB,, | Performed by: STUDENT IN AN ORGANIZED HEALTH CARE EDUCATION/TRAINING PROGRAM

## 2023-11-15 PROCEDURE — 3074F SYST BP LT 130 MM HG: CPT | Mod: CPTII,S$GLB,, | Performed by: STUDENT IN AN ORGANIZED HEALTH CARE EDUCATION/TRAINING PROGRAM

## 2023-11-15 PROCEDURE — 3078F DIAST BP <80 MM HG: CPT | Mod: CPTII,S$GLB,, | Performed by: STUDENT IN AN ORGANIZED HEALTH CARE EDUCATION/TRAINING PROGRAM

## 2023-11-15 PROCEDURE — 1126F AMNT PAIN NOTED NONE PRSNT: CPT | Mod: CPTII,S$GLB,, | Performed by: STUDENT IN AN ORGANIZED HEALTH CARE EDUCATION/TRAINING PROGRAM

## 2023-11-15 PROCEDURE — 3008F BODY MASS INDEX DOCD: CPT | Mod: CPTII,S$GLB,, | Performed by: STUDENT IN AN ORGANIZED HEALTH CARE EDUCATION/TRAINING PROGRAM

## 2023-11-15 PROCEDURE — 80048 BASIC METABOLIC PNL TOTAL CA: CPT | Performed by: STUDENT IN AN ORGANIZED HEALTH CARE EDUCATION/TRAINING PROGRAM

## 2023-11-15 PROCEDURE — 84153 ASSAY OF PSA TOTAL: CPT | Performed by: STUDENT IN AN ORGANIZED HEALTH CARE EDUCATION/TRAINING PROGRAM

## 2023-11-15 PROCEDURE — 99214 PR OFFICE/OUTPT VISIT, EST, LEVL IV, 30-39 MIN: ICD-10-PCS | Mod: S$GLB,,, | Performed by: STUDENT IN AN ORGANIZED HEALTH CARE EDUCATION/TRAINING PROGRAM

## 2023-11-15 RX ORDER — UBIDECARENONE 50 MG
TABLET ORAL
COMMUNITY
Start: 2023-10-23

## 2023-11-15 RX ORDER — TRIAMCINOLONE ACETONIDE 0.25 MG/G
CREAM TOPICAL 2 TIMES DAILY PRN
Qty: 15 G | Refills: 3 | Status: SHIPPED | OUTPATIENT
Start: 2023-11-15

## 2023-11-15 RX ORDER — ACETAMINOPHEN 500 MG
TABLET ORAL
COMMUNITY
Start: 2023-10-23

## 2023-11-15 RX ORDER — ACETYLCYSTEINE 600 MG
CAPSULE ORAL
COMMUNITY

## 2023-11-15 NOTE — PROGRESS NOTES
"  Thiago Thacker  1950        Subjective     Chief Complaint: F/u    History of Present Illness:  Mr. Thiago Thacker is a 73 y.o. male who presents to clinic for f/u.    Sometimes gets some rash on elbows.   Started an Athletic Greens supplement. Also using [employer] daily green plus. Seems to have flared the rash. Stopped one with the mushroom powder and rash has improved.        Seeing Sports medicine for knees. OA.    Hashimoto's and Vitiligo.   Lab Results   Component Value Date    TSH 2.808 07/10/2023        Saw Heme/Onc for leukopenia, thrombocytopenia. Dr. Uziel Han. Still on Sirolimus. Understands risks of taking this medication without dx.     "Most likely related to sirolimus. These findings are persistent on repeat labs today. Pathologic review of the smear is unremarkable. CMV and EBV tested due to immune suppressed status and negative as well. We will obtain abdominal ultrasound to evaluate for hepatosplenomegaly.      Reviewed that cytopenias in his case are mild and unlikely to cause significant qualitative dysfunction (such as increased bleeding). We reviewed the risk of using an immune suppressive agent with unknown or undemonstrated clinical benefit. He demonstrated good understanding.      Likely return to primary care with follow-up in hematology only as needed."    Seeing [facility]. Hard to drive at night. Cataracts removed. Was systane drops daily. No longer.    Some days wakes up feeling tired. Has eliana that told him he snores.     Mom with colon cancer. Had colonoscopy. Q3 yrs. Due 10/2026.    Review of Systems   Constitutional:  Positive for malaise/fatigue. Negative for chills and fever.   Respiratory:  Negative for cough.    Cardiovascular:  Negative for leg swelling.   Gastrointestinal:  Negative for abdominal pain, nausea and vomiting.   Genitourinary:  Positive for frequency (chronic).   Musculoskeletal:  Positive for joint pain.   Skin:  Positive for " rash.   Neurological:  Negative for speech change, focal weakness and weakness.   Psychiatric/Behavioral:  The patient is not nervous/anxious.         PAST HISTORY:     Past Medical History:   Diagnosis Date    Coronary atherosclerosis     Headache     Hypercholesterolemia     Myocardial infarction 2011       Past Surgical History:   Procedure Laterality Date    BLEPHAROPLASTY      CATARACT EXTRACTION W/  INTRAOCULAR LENS IMPLANT Right 6/1/2023    Procedure: EXTRACTION, CATARACT, WITH IOL INSERTION;  Surgeon: Nessa Romo MD;  Location: [facility] OR;  Service: Ophthalmology;  Laterality: Right;    CATARACT EXTRACTION W/  INTRAOCULAR LENS IMPLANT Left 8/16/2023    Procedure: EXTRACTION, CATARACT, WITH IOL INSERTION;  Surgeon: Nessa Romo MD;  Location: [facility] OR;  Service: Ophthalmology;  Laterality: Left;    COLONOSCOPY N/A 10/9/2023    Procedure: COLONOSCOPY;  Surgeon: Miguel Richardson MD;  Location: [facility] ENDOSCOPY;  Service: Endoscopy;  Laterality: N/A;  instructions sent to patient portal.TBou  referral: Dr. Reyes  pre call attempted, LV and sent portal message -CE    COMPLETE LASER PHOTOVAPORIZATION OF PROSTATE      CORONARY STENT PLACEMENT  2011    INGUINAL HERNIA REPAIR      ROBOT-ASSISTED LAPAROSCOPIC REPAIR OF INGUINAL HERNIA USING DA YRIS XI N/A 8/21/2023    Procedure: XI ROBOTIC REPAIR, HERNIA, INGUINAL, RIGHT (recurrent) w/ mesh;  Surgeon: Hussein Sy Jr., MD;  Location: [address];  Service: General;  Laterality: N/A;    vein         Family History   Problem Relation Age of Onset    Colon cancer Mother 80    Heart attack Father 90       Social History     Socioeconomic History    Marital status: Single   Tobacco Use    Smoking status: Never    Smokeless tobacco: Never   Substance and Sexual Activity    Alcohol use: Yes     Comment: occasionally    Drug use: Never    Sexual activity: Not Currently     Partners: Female     Social Determinants of Health     Financial Resource Strain:  Low Risk  (8/4/2023)    Overall Financial Resource Strain (CARDIA)     Difficulty of Paying Living Expenses: Not hard at all   Food Insecurity: No Food Insecurity (8/4/2023)    Hunger Vital Sign     Worried About Running Out of Food in the Last Year: Never true     Ran Out of Food in the Last Year: Never true   Transportation Needs: No Transportation Needs (8/4/2023)    PRAPARE - Transportation     Lack of Transportation (Medical): No     Lack of Transportation (Non-Medical): No   Physical Activity: Sufficiently Active (8/4/2023)    Exercise Vital Sign     Days of Exercise per Week: 7 days     Minutes of Exercise per Session: 40 min   Social Connections: Moderately Isolated (8/4/2023)    Social Connection and Isolation Panel [NHANES]     Frequency of Communication with Friends and Family: More than three times a week     Frequency of Social Gatherings with Friends and Family: Once a week     Attends Jain Services: Never     Active Member of Clubs or Organizations: Yes     Attends Club or Organization Meetings: Never     Marital Status: Never    Housing Stability: Unknown (8/4/2023)    Housing Stability Vital Sign     Unable to Pay for Housing in the Last Year: No     Unstable Housing in the Last Year: No       MEDICATIONS & ALLERGIES:     Current Outpatient Medications on File Prior to Visit   Medication Sig    acarbose (PRECOSE) 25 MG Tab     aspirin (ECOTRIN) 81 MG EC tablet Take 81 mg by mouth once daily.    atorvastatin (LIPITOR) 40 MG tablet Take 1 tablet (40 mg total) by mouth once daily.    calcium carb-magnesium ox,carb (DAMIAN-MAG) 200 mg calcium- 100 mg Chew     cholecalciferol, vitamin D3, (VITAMIN D3) 125 mcg (5,000 unit) Tab     finasteride (PROSCAR) 5 mg tablet Take 1 tablet (5 mg total) by mouth once daily.    GREEN TEA EXTRACT ORAL Take 1 capsule by mouth once daily.    hyaluronate sodium (HYALURONIC ACID, SODIUM, ORAL)     levothyroxine (SYNTHROID, LEVOTHROID) 175 MCG tablet TAKE 1 TABLET  (175 MCG TOTAL) BY MOUTH BEFORE BREAKFAST.    meloxicam (MOBIC) 15 MG tablet Take 1 tablet by mouth once daily    multivitamin (THERAGRAN) per tablet Take 1 tablet by mouth once daily.    omega-3/dha/epa/fish oil (OMEGA-3 FISH OIL ORAL) Take 4 g by mouth once daily.    sirolimus (RAPAMUNE) 1 MG Tab Take 10 mg by mouth once a week. Sat    soybean, fermented (NATTOKINASE) 50 mg Cap     tadalafiL (CIALIS) 5 MG tablet Take 1 tablet (5 mg total) by mouth daily as needed for Erectile Dysfunction.    vitamin B complex (B-50 COMPLEX ORAL)     [DISCONTINUED] acetaminophen (TYLENOL) 500 MG tablet Take 1 tablet (500 mg total) by mouth every 6 (six) hours as needed for Pain (alternate with ibuprofen).    [DISCONTINUED] alpha lipoic acid 200 mg Tab     [DISCONTINUED] erythromycin with ethanoL (EMGEL) 2 % gel Apply topically 2 (two) times daily. Prn breakouts of posterior neck    [DISCONTINUED] ibuprofen (ADVIL,MOTRIN) 400 MG tablet Take 1 tablet (400 mg total) by mouth every 6 (six) hours as needed for Other (pain). Alternate with tylenol    [DISCONTINUED] oxyCODONE (ROXICODONE) 5 MG immediate release tablet Take 1 tablet (5 mg total) by mouth every 4 (four) hours as needed for Pain (if pain is not relieved by alternating tylenol and ibuprofen).    [DISCONTINUED] prednisolon/gatiflox/bromfenac (PREDNISOL ACE-GATIFLOX-BROMFEN) 1-0.5-0.075 % DrpS Apply 1 drop to eye 3 (three) times daily.    [DISCONTINUED] QUERCETIN DIHYDRATE, BULK, MISC 1,000 mg by Misc.(Non-Drug; Combo Route) route once daily. Quercetin    [DISCONTINUED] RESVERATROL ORAL Take 200 mg by mouth. Resveratrol (Longevinex formulation)    [DISCONTINUED] taurine 1,000 mg Cap Take 6 g by mouth once daily.    [DISCONTINUED] triamcinolone acetonide 0.1% (KENALOG) 0.1 % ointment Apply topically 2 (two) times daily.    [DISCONTINUED] turmeric (CURCUMIN MISC) by Misc.(Non-Drug; Combo Route) route.     Current Facility-Administered Medications on File Prior to Visit  "  Medication    balanced salt irrigation intra-ocular solution 1 drop    balanced salt irrigation intra-ocular solution 1 drop    phenylephrine HCL 10% ophthalmic solution 1 drop    phenylephrine HCL 10% ophthalmic solution 1 drop    proparacaine 0.5 % ophthalmic solution 1 drop    sodium chloride 0.9% flush 2 mL    sodium chloride 0.9% flush 2 mL    TETRAcaine HCl (PF) 0.5 % Drop 1 drop       Review of patient's allergies indicates:   Allergen Reactions    Celebrex [celecoxib] Rash       OBJECTIVE:     Vital Signs:  Vitals:    11/15/23 0922   BP: 124/70   BP Location: Left arm   Patient Position: Sitting   BP Method: Medium (Manual)   Pulse: 62   SpO2: 98%   Weight: 87.3 kg (192 lb 7.4 oz)   Height: 5' 11" (1.803 m)       Body mass index is 26.84 kg/m².     Physical Exam:  Physical Exam  Vitals and nursing note reviewed.   Constitutional:       General: He is not in acute distress.     Appearance: Normal appearance. He is not ill-appearing, toxic-appearing or diaphoretic.   HENT:      Head: Normocephalic and atraumatic.   Eyes:      General: No scleral icterus.     Conjunctiva/sclera: Conjunctivae normal.   Pulmonary:      Effort: Pulmonary effort is normal. No respiratory distress.   Skin:     Findings: Rash present.             Comments: Some scaling patches on flexural areas, mild. No signs of infection. Hyperpigmented lesions noted.   Neurological:      Mental Status: He is alert and oriented to person, place, and time. Mental status is at baseline.   Psychiatric:         Mood and Affect: Mood normal.         Behavior: Behavior normal.            Laboratory  Lab Results   Component Value Date    WBC 3.68 (L) 08/17/2023    WBC 3.76 (L) 08/17/2023    HGB 14.0 08/17/2023    HGB 13.8 (L) 08/17/2023    HCT 41.2 08/17/2023    HCT 41.0 08/17/2023    MCV 92 08/17/2023    MCV 92 08/17/2023     (L) 08/17/2023     (L) 08/17/2023     Lab Results   Component Value Date    GLU 93 08/17/2023     08/17/2023 " "   K 4.3 08/17/2023     08/17/2023    CO2 27 08/17/2023    BUN 14 08/17/2023    CREATININE 0.9 08/17/2023    CALCIUM 8.9 08/17/2023     No results found for: "INR", "PROTIME"  No results found for: "HGBA1C"        Health Maintenance         Date Due Completion Date    TETANUS VACCINE Never done ---    Shingles Vaccine (1 of 2) 12/27/2023 11/1/2023    Aspirin/Antiplatelet Therapy 11/15/2024 11/15/2023    Colorectal Cancer Screening 10/09/2026 10/9/2023    Lipid Panel 07/10/2028 7/10/2023                ASSESSMENT & PLAN:   Mr. Thiago Thacker is a 73 y.o. male who was seen today in clinic for f/u. Would avoid OTC supplements, may be related to rash.       1. Long term current use of immunosuppressive drug  -     BASIC METABOLIC PANEL; Future; Expected date: 11/15/2023  -     HEMOGLOBIN A1C; Future; Expected date: 11/15/2023    2. Family history of colon cancer    3. Thrombocytopenia  -     CBC Auto Differential; Future; Expected date: 11/15/2023    4. Hx of myocardial infarction    5. Leukopenia, unspecified type  -     CBC Auto Differential; Future; Expected date: 11/15/2023    6. Snoring  -     Ambulatory referral/consult to Sleep Disorders; Future; Expected date: 11/22/2023    7. Rash and nonspecific skin eruption  -     triamcinolone acetonide 0.025% (KENALOG) 0.025 % cream; Apply topically 2 (two) times daily as needed (Rash).  Dispense: 15 g; Refill: 3    8. Benign prostatic hyperplasia with nocturia    9. Hypothyroidism, unspecified type  -     TSH; Future; Expected date: 11/15/2023    10. Prostate cancer screening  -     PSA, SCREENING; Future; Expected date: 11/15/2023           Kristyn Reyes MD  Internal Medicine         Portions of this note may have been generated using voice recognition software.  Please excuse any spelling/grammatical errors. Occasional wrong-word or sound-a-like substitutions may have also occurred due to the inherent limitations of voice recognition software. Please read the " chart carefully and recognize, using context, where substitutions have occurred.

## 2023-11-20 ENCOUNTER — PATIENT MESSAGE (OUTPATIENT)
Dept: PRIMARY CARE CLINIC | Facility: CLINIC | Age: 73
End: 2023-11-20
Payer: COMMERCIAL

## 2023-11-21 DIAGNOSIS — E03.9 HYPOTHYROIDISM, UNSPECIFIED TYPE: Primary | ICD-10-CM

## 2023-12-31 DIAGNOSIS — N52.9 ERECTILE DYSFUNCTION, UNSPECIFIED ERECTILE DYSFUNCTION TYPE: ICD-10-CM

## 2023-12-31 DIAGNOSIS — R35.1 BENIGN PROSTATIC HYPERPLASIA WITH NOCTURIA: ICD-10-CM

## 2023-12-31 DIAGNOSIS — N40.1 BENIGN PROSTATIC HYPERPLASIA WITH NOCTURIA: ICD-10-CM

## 2023-12-31 NOTE — TELEPHONE ENCOUNTER
No care due was identified.  Health [facility] Embedded Care Due Messages. Reference number: [id number].   12/31/2023 2:21:38 PM CST

## 2024-01-02 ENCOUNTER — LAB VISIT (OUTPATIENT)
Dept: LAB | Facility: HOSPITAL | Age: 74
End: 2024-01-02
Attending: STUDENT IN AN ORGANIZED HEALTH CARE EDUCATION/TRAINING PROGRAM
Payer: COMMERCIAL

## 2024-01-02 DIAGNOSIS — E03.9 HYPOTHYROIDISM, UNSPECIFIED TYPE: ICD-10-CM

## 2024-01-02 LAB
T4 FREE SERPL-MCNC: 0.91 NG/DL (ref 0.71–1.51)
TSH SERPL DL<=0.005 MIU/L-ACNC: 5.95 UIU/ML (ref 0.4–4)

## 2024-01-02 PROCEDURE — 84439 ASSAY OF FREE THYROXINE: CPT | Performed by: STUDENT IN AN ORGANIZED HEALTH CARE EDUCATION/TRAINING PROGRAM

## 2024-01-02 PROCEDURE — 36415 COLL VENOUS BLD VENIPUNCTURE: CPT | Mod: PN | Performed by: STUDENT IN AN ORGANIZED HEALTH CARE EDUCATION/TRAINING PROGRAM

## 2024-01-02 PROCEDURE — 84443 ASSAY THYROID STIM HORMONE: CPT | Performed by: STUDENT IN AN ORGANIZED HEALTH CARE EDUCATION/TRAINING PROGRAM

## 2024-01-03 ENCOUNTER — PATIENT MESSAGE (OUTPATIENT)
Dept: PRIMARY CARE CLINIC | Facility: CLINIC | Age: 74
End: 2024-01-03
Payer: COMMERCIAL

## 2024-01-03 DIAGNOSIS — E03.9 HYPOTHYROIDISM, UNSPECIFIED TYPE: Primary | ICD-10-CM

## 2024-01-03 RX ORDER — LEVOTHYROXINE SODIUM 200 UG/1
200 TABLET ORAL
Qty: 30 TABLET | Refills: 0 | Status: SHIPPED | OUTPATIENT
Start: 2024-01-03 | End: 2024-02-06 | Stop reason: SDUPTHER

## 2024-01-03 RX ORDER — TADALAFIL 5 MG/1
5 TABLET ORAL DAILY PRN
Qty: 90 TABLET | Refills: 0 | Status: SHIPPED | OUTPATIENT
Start: 2024-01-03

## 2024-01-23 ENCOUNTER — TELEPHONE (OUTPATIENT)
Dept: SPORTS MEDICINE | Facility: CLINIC | Age: 74
End: 2024-01-23
Payer: COMMERCIAL

## 2024-01-23 NOTE — TELEPHONE ENCOUNTER
Called pt to gt him scheduled for PRP. Informed pt that the procedure is $800. Pt said ok. Scheduled pt to come in on 2/29

## 2024-02-01 ENCOUNTER — LAB VISIT (OUTPATIENT)
Dept: LAB | Facility: HOSPITAL | Age: 74
End: 2024-02-01
Attending: STUDENT IN AN ORGANIZED HEALTH CARE EDUCATION/TRAINING PROGRAM
Payer: COMMERCIAL

## 2024-02-01 DIAGNOSIS — E03.9 HYPOTHYROIDISM, UNSPECIFIED TYPE: ICD-10-CM

## 2024-02-01 LAB — TSH SERPL DL<=0.005 MIU/L-ACNC: 4.37 UIU/ML (ref 0.4–4)

## 2024-02-01 PROCEDURE — 36415 COLL VENOUS BLD VENIPUNCTURE: CPT | Mod: PN | Performed by: STUDENT IN AN ORGANIZED HEALTH CARE EDUCATION/TRAINING PROGRAM

## 2024-02-01 PROCEDURE — 84443 ASSAY THYROID STIM HORMONE: CPT | Performed by: STUDENT IN AN ORGANIZED HEALTH CARE EDUCATION/TRAINING PROGRAM

## 2024-02-01 PROCEDURE — 84439 ASSAY OF FREE THYROXINE: CPT | Performed by: STUDENT IN AN ORGANIZED HEALTH CARE EDUCATION/TRAINING PROGRAM

## 2024-02-02 LAB — T4 FREE SERPL-MCNC: 0.85 NG/DL (ref 0.71–1.51)

## 2024-02-03 ENCOUNTER — PATIENT MESSAGE (OUTPATIENT)
Dept: PRIMARY CARE CLINIC | Facility: CLINIC | Age: 74
End: 2024-02-03
Payer: COMMERCIAL

## 2024-02-03 DIAGNOSIS — E03.9 HYPOTHYROIDISM, UNSPECIFIED TYPE: ICD-10-CM

## 2024-02-05 DIAGNOSIS — E03.9 HYPOTHYROIDISM, UNSPECIFIED TYPE: Primary | ICD-10-CM

## 2024-02-06 DIAGNOSIS — E03.9 HYPOTHYROIDISM, UNSPECIFIED TYPE: ICD-10-CM

## 2024-02-06 RX ORDER — LEVOTHYROXINE SODIUM 200 UG/1
200 TABLET ORAL
Qty: 30 TABLET | Refills: 0 | Status: SHIPPED | OUTPATIENT
Start: 2024-02-06 | End: 2024-03-15 | Stop reason: SDUPTHER

## 2024-02-06 RX ORDER — LEVOTHYROXINE SODIUM 25 UG/1
25 TABLET ORAL
Qty: 30 TABLET | Refills: 0 | Status: SHIPPED | OUTPATIENT
Start: 2024-02-06 | End: 2024-03-15 | Stop reason: SDUPTHER

## 2024-02-06 NOTE — TELEPHONE ENCOUNTER
No care due was identified.  [facility] Embedded Care Due Messages. Reference number: [id number].   2/06/2024 7:31:29 AM CST

## 2024-02-14 ENCOUNTER — OFFICE VISIT (OUTPATIENT)
Dept: PLASTIC SURGERY | Facility: CLINIC | Age: 74
End: 2024-02-14

## 2024-02-14 DIAGNOSIS — L98.8 FACIAL RHYTIDS: Primary | ICD-10-CM

## 2024-02-14 PROCEDURE — 99024 POSTOP FOLLOW-UP VISIT: CPT | Mod: ,,, | Performed by: OTOLARYNGOLOGY

## 2024-02-14 NOTE — PROGRESS NOTES
Patient here for consultation only.  He is neuro psychologist.  He had 2 cosmetic procedures in the past 14 and 10 years ago with what he describes as upper blepharoplasty possible lower as well as a face lift.  He is displeased with facial appearance.  He does have recurrence of jowling and prominent nasolabial folds.  He is also concerned about brant orbital area with what he reports as a bump which is really the orbital rim.    Exam:   Appropriate mood and affect   Glogau scale 3-4  Healed prior face-lift incision.    Loss of malar eminence fat with prominent nasolabial fold.  Prominent perioral creases laterally at the commissure and mental crease  Marionette line with moderate jowling    Long discussion held with the patient about options for aging face.  Nonsurgical options of Sculptra versus Voluma and other filler was discussed in detail.  Surgical options would include any of the following or all of the following to include endoscopic brow lift with upper and lower blepharoplasty and revision face-lift.  Patient wants to consider his options.  He will come back for filler or Sculptra if he desires to proceed.

## 2024-02-19 DIAGNOSIS — M25.562 CHRONIC PAIN OF LEFT KNEE: ICD-10-CM

## 2024-02-19 DIAGNOSIS — G89.29 CHRONIC PAIN OF LEFT KNEE: ICD-10-CM

## 2024-02-23 RX ORDER — MELOXICAM 15 MG/1
15 TABLET ORAL DAILY
Qty: 30 TABLET | Refills: 0 | Status: SHIPPED | OUTPATIENT
Start: 2024-02-23 | End: 2024-04-13 | Stop reason: SDUPTHER

## 2024-02-26 ENCOUNTER — OFFICE VISIT (OUTPATIENT)
Dept: SLEEP MEDICINE | Facility: CLINIC | Age: 74
End: 2024-02-26
Payer: COMMERCIAL

## 2024-02-26 VITALS
OXYGEN SATURATION: 96 % | WEIGHT: 191.81 LBS | BODY MASS INDEX: 26.85 KG/M2 | HEART RATE: 68 BPM | SYSTOLIC BLOOD PRESSURE: 140 MMHG | DIASTOLIC BLOOD PRESSURE: 90 MMHG | HEIGHT: 71 IN

## 2024-02-26 DIAGNOSIS — R35.1 NOCTURIA: ICD-10-CM

## 2024-02-26 DIAGNOSIS — R49.9 CHANGE OF VOICE: ICD-10-CM

## 2024-02-26 DIAGNOSIS — R13.10 DYSPHAGIA, UNSPECIFIED TYPE: ICD-10-CM

## 2024-02-26 DIAGNOSIS — R06.83 SNORING: Primary | ICD-10-CM

## 2024-02-26 DIAGNOSIS — F51.09 OTHER INSOMNIA NOT DUE TO A SUBSTANCE OR KNOWN PHYSIOLOGICAL CONDITION: ICD-10-CM

## 2024-02-26 DIAGNOSIS — R12 HEARTBURN: ICD-10-CM

## 2024-02-26 PROCEDURE — 99204 OFFICE O/P NEW MOD 45 MIN: CPT | Mod: S$GLB,,, | Performed by: PHYSICIAN ASSISTANT

## 2024-02-26 PROCEDURE — 3288F FALL RISK ASSESSMENT DOCD: CPT | Mod: CPTII,S$GLB,, | Performed by: PHYSICIAN ASSISTANT

## 2024-02-26 PROCEDURE — 3008F BODY MASS INDEX DOCD: CPT | Mod: CPTII,S$GLB,, | Performed by: PHYSICIAN ASSISTANT

## 2024-02-26 PROCEDURE — 1159F MED LIST DOCD IN RCRD: CPT | Mod: CPTII,S$GLB,, | Performed by: PHYSICIAN ASSISTANT

## 2024-02-26 PROCEDURE — 1126F AMNT PAIN NOTED NONE PRSNT: CPT | Mod: CPTII,S$GLB,, | Performed by: PHYSICIAN ASSISTANT

## 2024-02-26 PROCEDURE — 3077F SYST BP >= 140 MM HG: CPT | Mod: CPTII,S$GLB,, | Performed by: PHYSICIAN ASSISTANT

## 2024-02-26 PROCEDURE — 1160F RVW MEDS BY RX/DR IN RCRD: CPT | Mod: CPTII,S$GLB,, | Performed by: PHYSICIAN ASSISTANT

## 2024-02-26 PROCEDURE — 99999 PR PBB SHADOW E&M-EST. PATIENT-LVL V: CPT | Mod: PBBFAC,,, | Performed by: PHYSICIAN ASSISTANT

## 2024-02-26 PROCEDURE — 1101F PT FALLS ASSESS-DOCD LE1/YR: CPT | Mod: CPTII,S$GLB,, | Performed by: PHYSICIAN ASSISTANT

## 2024-02-26 PROCEDURE — 3080F DIAST BP >= 90 MM HG: CPT | Mod: CPTII,S$GLB,, | Performed by: PHYSICIAN ASSISTANT

## 2024-02-26 NOTE — PROGRESS NOTES
"Referred by Kristyn Reyes MD     NEW PATIENT VISIT    Thiago Thacker  is a pleasant 74 y.o. male  with PMH significant for hypercholesterolemia, hx MI. Hypothyroidism, BPH, OA who presents for sleep evaluation following referral from PCP        C/o snoring, disrupted sleep 2/2 to frequent nocturia (4-9 x nightly). Reports he feels like she sleeps deeply and well until about MN-1AM, then wakes regularly after that time, occasionally with difficulties returning to sleep. Reports running daily and exercising daily until age 72. Then developed knee problems. Still tries to stay active and healthy, follows mediterranean diet. Cold brew coffee in the AM. Has black tea occasionally, in the afternoon. Also reports occasional heartburn and feelings of difficulty swallowing on and off over the last 2 years. Also he has reported a voice change over the last 2 years as well. His PCP recommended evaluation for QUEENIE, which is why he presents today.      SLEEP SCHEDULE   Environment    Bed Time 8-9PM   Sleep Latency 20mins   Arousals "Multiple"   Nocturia "Multiple"   Back to sleep 30mins   Wake time 5:30AM   Naps none   Work            2/19/2024     8:26 PM   Sleep Clinic ROS    Difficulty breathing through the nose?  No   Sore throat or dry mouth in the morning? No   Irregular or very fast heart beat?  No   Shortness of breath?  No   Acid reflux? No   Body aches and pains?  Yes   Morning headaches? No   Dizziness? No   Mood changes?  No   Do you exercise?  Yes   Do you feel like moving your legs a lot?  No       Past Medical History:   Diagnosis Date    Coronary atherosclerosis     Headache     Hypercholesterolemia     Myocardial infarction 2011     Patient Active Problem List   Diagnosis    BPH (benign prostatic hyperplasia)    Hypercholesterolemia    Hypothyroid    Muscle weakness of lower extremity    Decreased range of motion (ROM) of right knee    Coronary atherosclerosis    Hx of myocardial infarction    Thrombocytopenia "    Osteoarthritis of right knee    Cervical myofascial pain syndrome    Rash of neck       Current Outpatient Medications:     acarbose (PRECOSE) 25 MG Tab, , Disp: , Rfl:     aspirin (ECOTRIN) 81 MG EC tablet, Take 81 mg by mouth once daily., Disp: , Rfl:     atorvastatin (LIPITOR) 40 MG tablet, Take 1 tablet (40 mg total) by mouth once daily., Disp: 90 tablet, Rfl: 1    calcium carb-magnesium ox,carb (DAMIAN-MAG) 200 mg calcium- 100 mg Chew, , Disp: , Rfl:     cholecalciferol, vitamin D3, (VITAMIN D3) 125 mcg (5,000 unit) Tab, , Disp: , Rfl:     finasteride (PROSCAR) 5 mg tablet, Take 1 tablet (5 mg total) by mouth once daily., Disp: 90 tablet, Rfl: 1    GREEN TEA EXTRACT ORAL, Take 1 capsule by mouth once daily., Disp: , Rfl:     hyaluronate sodium (HYALURONIC ACID, SODIUM, ORAL), , Disp: , Rfl:     levothyroxine (SYNTHROID) 200 MCG tablet, Take 1 tablet (200 mcg total) by mouth before breakfast., Disp: 30 tablet, Rfl: 0    levothyroxine (SYNTHROID) 25 MCG tablet, Take 1 tablet (25 mcg total) by mouth before breakfast., Disp: 30 tablet, Rfl: 0    meloxicam (MOBIC) 15 MG tablet, Take 1 tablet (15 mg total) by mouth once daily., Disp: 30 tablet, Rfl: 0    multivitamin (THERAGRAN) per tablet, Take 1 tablet by mouth once daily., Disp: , Rfl:     omega-3/dha/epa/fish oil (OMEGA-3 FISH OIL ORAL), Take 4 g by mouth once daily., Disp: , Rfl:     sirolimus (RAPAMUNE) 1 MG Tab, Take 10 mg by mouth once a week. Sat, Disp: , Rfl:     soybean, fermented (NATTOKINASE) 50 mg Cap, , Disp: , Rfl:     tadalafiL (CIALIS) 5 MG tablet, Take 1 tablet (5 mg total) by mouth daily as needed for Erectile Dysfunction., Disp: 90 tablet, Rfl: 0    triamcinolone acetonide 0.025% (KENALOG) 0.025 % cream, Apply topically 2 (two) times daily as needed (Rash)., Disp: 15 g, Rfl: 3    vitamin B complex (B-50 COMPLEX ORAL), , Disp: , Rfl:   No current facility-administered medications for this visit.    Facility-Administered Medications Ordered in  Other Visits:     balanced salt irrigation intra-ocular solution 1 drop, 1 drop, Right Eye, On Call Procedure, Nessa Romo MD    balanced salt irrigation intra-ocular solution 1 drop, 1 drop, Left Eye, On Call Procedure, Nessa Romo MD    phenylephrine HCL 10% ophthalmic solution 1 drop, 1 drop, Right Eye, PRN, Nessa Romo MD    phenylephrine HCL 10% ophthalmic solution 1 drop, 1 drop, Left Eye, PRN, Nessa Romo MD    proparacaine 0.5 % ophthalmic solution 1 drop, 1 drop, Left Eye, PRN, Nessa Romo MD    sodium chloride 0.9% flush 2 mL, 2 mL, Intravenous, PRN, Nessa Romo MD    sodium chloride 0.9% flush 2 mL, 2 mL, Intravenous, PRN, Nessa Romo MD    TETRAcaine HCl (PF) 0.5 % Drop 1 drop, 1 drop, Left Eye, PRN, Nessa Romo MD     There were no vitals filed for this visit.    Physical Exam:    GEN:   Well-appearing  Psych:  Appropriate affect, demonstrates insight  SKIN:  No rash on the face or bridge of the nose      LABS:   Lab Results   Component Value Date    HGB 14.1 11/15/2023    CO2 27 11/15/2023         RECORDS REVIEWED:    No previous sleep study    ASSESSMENT        2/19/2024     8:23 PM   EPWORTH SLEEPINESS SCALE   Sitting and reading 1   Watching TV 0   Sitting, inactive in a public place (e.g. a theatre or a meeting) 0   As a passenger in a car for an hour without a break 0   Lying down to rest in the afternoon when circumstances permit 0   Sitting and talking to someone 0   Sitting quietly after a lunch without alcohol 0   In a car, while stopped for a few minutes in traffic 0   Total score 1       PROBLEM DESCRIPTION/ Sx on Presentation  STATUS   sx QUEENIE   + snoring, denies arousals, denies witnessed apneas    New   Daytime Sx   denies sleepiness when inactive   ESS 1/24 on intake  New   Insomnia   Trouble falling asleep: no issues  Arousals:         wakes for nocturia  Hard to get back to sleep?: occasionally difficult    Prior pertinent  medications:  Current pertinent medications:   New   Nocturia   x 5-9 per sleep period  Hx BPH  New   Other issues: heartburn, voice change, intermittent dysphagia     PLAN     -recommend sleep testing   -HST ordered  -discussed trial therapy if QUEENIE present and the patient is open to a trial of CPAP therapy  -discussed QUEENIE and CPAP with patient in detail, including possible complications of untreated QUEENIE like heart attack/stroke  -advised on strict driving precautions; advised never to drive drowsy  -recommended evaluation with GI/ENT for further evaluation of heartburn, intermittent dysphagia, and voice change; pt states he will make necessary appointments does not require referral    Advised on plan of care. Answered all patient questions. Patient verbalized understanding and voiced agreement with plan of care.     RTC if dx of QUEENIE made and CPAP ordered, will need follow up 31-90 days after receiving machine for compliance      The patient was given open opportunity to ask questions and/or express concerns about treatment plan. All questions/concerns were discussed.     Two patient identifiers used prior to evaluation.

## 2024-02-29 ENCOUNTER — OFFICE VISIT (OUTPATIENT)
Dept: SPORTS MEDICINE | Facility: CLINIC | Age: 74
End: 2024-02-29
Payer: COMMERCIAL

## 2024-02-29 VITALS
HEIGHT: 71 IN | SYSTOLIC BLOOD PRESSURE: 151 MMHG | WEIGHT: 192.88 LBS | BODY MASS INDEX: 27 KG/M2 | HEART RATE: 62 BPM | DIASTOLIC BLOOD PRESSURE: 81 MMHG

## 2024-02-29 DIAGNOSIS — M17.12 PRIMARY OSTEOARTHRITIS OF LEFT KNEE: Primary | ICD-10-CM

## 2024-02-29 DIAGNOSIS — M25.562 LEFT KNEE PAIN, UNSPECIFIED CHRONICITY: ICD-10-CM

## 2024-02-29 DIAGNOSIS — R26.89 ANTALGIC GAIT: ICD-10-CM

## 2024-02-29 PROCEDURE — 99999 PR PBB SHADOW E&M-EST. PATIENT-LVL IV: CPT | Mod: PBBFAC,,, | Performed by: FAMILY MEDICINE

## 2024-02-29 PROCEDURE — 0232T NJX PLATELET PLASMA: CPT | Mod: CSM,,, | Performed by: FAMILY MEDICINE

## 2024-02-29 PROCEDURE — 99499 UNLISTED E&M SERVICE: CPT | Mod: CSM,S$GLB,, | Performed by: FAMILY MEDICINE

## 2024-02-29 NOTE — PROGRESS NOTES
Number 3/3    PRE-PROCEDURE DIAGNOSIS and PATIENT INDICATIONS  Thiago Thacker, a 74 y.o. male, presents today with:   Kellgren-Shabbir Grade III osteoarthritis of knee, jamar ant+med compartment, left    PROCEDURE PERFORMED  Platelet rich plasma (PRP) injection performed using ultrasound guidance for exact placement of orthobiologic at pathologic site(s)    POTENTIAL RISKS AND BENEFITS  We discussed that this is generally a well-tolerated and safe procedure. Even so, as with any invasive medical procedure, there may be associated rare risks. These risks were discussed in detail. The patient agreed to proceed with this procedure. Please see the electronic medical record for full written and signed patient consent documentation.    DESCRIPTION OF PROCEDURE  A) Procedural time out  A procedural time out was performed, including verification of patient ID, procedure to be performed, site and side/laterality, availability of patient information, review of safety issues, and the patients agreement and consent.     B) Phlebotomy and autograft platelet harvest, and PRP preparation  Sterile technique was used to phlebotomize 120mL of the patients blood from a peripheral vein. This blood was  into density-divided layers using an Didasco Nolan centrifuge. The layer of leukocyte poor hematocrit 2%) PRP 3mL, a volume drawn up to 6mL (with PPP), was placed into a sterile syringe in preparation for injection.     C) Platelet rich plasma delivery to pathologic site  Injection site and laterality: left KNEE  Using 1) physical examination and 2) musculoskeletal ultrasound and 3) recent MRI, the anatomy was visualized and the diseased tissue was identified and confirmed. The procedure site was marked with a skin marker. The patient was placed in position maximizing 1) physician access to pathologic tissue and 2) patient comfort. The skin about the area was sterilized with ChloraPrep (2%w/v CHG, 70% IPA). The site of needle  insertion was anesthetized using vapocoolant. Under ultrasound guidance, the needle was advanced to the site of tissue pathology, and the injectate was deposited under direct visualization.    POST-PROCEDURE DIAGNOSIS  Kellgren-Shabbir Grade III osteoarthritis of knee, jamar ant+med compartment, left    POST-PROCEDURE CARE  Following the procedure, a sterile bandage was placed over the injection site.  A procedure after care instructions (ACI) handout was provided to the patient.    Complications: none     Estimated blood loss from injection procedure: none     Joint aspirate volume, if required: 0mL     DISPOSITION  The patient tolerated the procedure well and there were no immediate complications. The patient was instructed to call the clinic immediately for any mild to moderate adverse side effects, or to call 911 in the event of an emergency.        Description of ultrasound utilization for needle / probe guidance  Ultrasound guidance was used for needle / probe localization. Images (and videos, if appropriate) were saved and stored for documentation. Dynamic visualization of the needle / probe was continuous throughout the procedure.    0232T

## 2024-03-01 ENCOUNTER — PATIENT MESSAGE (OUTPATIENT)
Dept: SLEEP MEDICINE | Facility: CLINIC | Age: 74
End: 2024-03-01
Payer: COMMERCIAL

## 2024-03-04 ENCOUNTER — TELEPHONE (OUTPATIENT)
Dept: SLEEP MEDICINE | Facility: OTHER | Age: 74
End: 2024-03-04
Payer: COMMERCIAL

## 2024-03-07 ENCOUNTER — TELEPHONE (OUTPATIENT)
Dept: SLEEP MEDICINE | Facility: OTHER | Age: 74
End: 2024-03-07
Payer: COMMERCIAL

## 2024-03-12 ENCOUNTER — LAB VISIT (OUTPATIENT)
Dept: LAB | Facility: HOSPITAL | Age: 74
End: 2024-03-12
Attending: STUDENT IN AN ORGANIZED HEALTH CARE EDUCATION/TRAINING PROGRAM
Payer: COMMERCIAL

## 2024-03-12 DIAGNOSIS — E03.9 HYPOTHYROIDISM, UNSPECIFIED TYPE: ICD-10-CM

## 2024-03-12 LAB
T4 FREE SERPL-MCNC: 1.02 NG/DL (ref 0.71–1.51)
TSH SERPL DL<=0.005 MIU/L-ACNC: 4.18 UIU/ML (ref 0.4–4)

## 2024-03-12 PROCEDURE — 36415 COLL VENOUS BLD VENIPUNCTURE: CPT | Mod: PN | Performed by: STUDENT IN AN ORGANIZED HEALTH CARE EDUCATION/TRAINING PROGRAM

## 2024-03-12 PROCEDURE — 84439 ASSAY OF FREE THYROXINE: CPT | Performed by: STUDENT IN AN ORGANIZED HEALTH CARE EDUCATION/TRAINING PROGRAM

## 2024-03-12 PROCEDURE — 84443 ASSAY THYROID STIM HORMONE: CPT | Performed by: STUDENT IN AN ORGANIZED HEALTH CARE EDUCATION/TRAINING PROGRAM

## 2024-03-13 ENCOUNTER — PATIENT MESSAGE (OUTPATIENT)
Dept: PRIMARY CARE CLINIC | Facility: CLINIC | Age: 74
End: 2024-03-13
Payer: COMMERCIAL

## 2024-03-13 DIAGNOSIS — E03.9 HYPOTHYROIDISM, UNSPECIFIED TYPE: Primary | ICD-10-CM

## 2024-03-15 DIAGNOSIS — E03.9 HYPOTHYROIDISM, UNSPECIFIED TYPE: ICD-10-CM

## 2024-03-15 RX ORDER — LEVOTHYROXINE SODIUM 200 UG/1
200 TABLET ORAL
Qty: 30 TABLET | Refills: 0 | Status: SHIPPED | OUTPATIENT
Start: 2024-03-15 | End: 2024-04-05 | Stop reason: SDUPTHER

## 2024-03-15 RX ORDER — LEVOTHYROXINE SODIUM 25 UG/1
25 TABLET ORAL
Qty: 30 TABLET | Refills: 0 | Status: SHIPPED | OUTPATIENT
Start: 2024-03-15 | End: 2024-04-05 | Stop reason: SDUPTHER

## 2024-03-15 NOTE — TELEPHONE ENCOUNTER
No care due was identified.  [facility] Embedded Care Due Messages. Reference number: [id number].   3/15/2024 6:24:29 AM CDT

## 2024-03-20 ENCOUNTER — TELEPHONE (OUTPATIENT)
Dept: SLEEP MEDICINE | Facility: OTHER | Age: 74
End: 2024-03-20
Payer: COMMERCIAL

## 2024-03-21 ENCOUNTER — TELEPHONE (OUTPATIENT)
Dept: SLEEP MEDICINE | Facility: OTHER | Age: 74
End: 2024-03-21
Payer: COMMERCIAL

## 2024-03-27 ENCOUNTER — TELEPHONE (OUTPATIENT)
Dept: SLEEP MEDICINE | Facility: OTHER | Age: 74
End: 2024-03-27
Payer: COMMERCIAL

## 2024-03-28 ENCOUNTER — TELEPHONE (OUTPATIENT)
Dept: SLEEP MEDICINE | Facility: OTHER | Age: 74
End: 2024-03-28
Payer: COMMERCIAL

## 2024-04-03 ENCOUNTER — LAB VISIT (OUTPATIENT)
Dept: LAB | Facility: HOSPITAL | Age: 74
End: 2024-04-03
Payer: COMMERCIAL

## 2024-04-03 DIAGNOSIS — E03.9 HYPOTHYROIDISM, UNSPECIFIED TYPE: ICD-10-CM

## 2024-04-03 LAB — TSH SERPL DL<=0.005 MIU/L-ACNC: 0.96 UIU/ML (ref 0.4–4)

## 2024-04-03 PROCEDURE — 36415 COLL VENOUS BLD VENIPUNCTURE: CPT | Mod: PN | Performed by: STUDENT IN AN ORGANIZED HEALTH CARE EDUCATION/TRAINING PROGRAM

## 2024-04-03 PROCEDURE — 84443 ASSAY THYROID STIM HORMONE: CPT | Performed by: STUDENT IN AN ORGANIZED HEALTH CARE EDUCATION/TRAINING PROGRAM

## 2024-04-05 ENCOUNTER — PATIENT MESSAGE (OUTPATIENT)
Dept: PRIMARY CARE CLINIC | Facility: CLINIC | Age: 74
End: 2024-04-05
Payer: COMMERCIAL

## 2024-04-05 DIAGNOSIS — E03.9 HYPOTHYROIDISM, UNSPECIFIED TYPE: ICD-10-CM

## 2024-04-05 RX ORDER — LEVOTHYROXINE SODIUM 200 UG/1
200 TABLET ORAL
Qty: 90 TABLET | Refills: 0 | Status: SHIPPED | OUTPATIENT
Start: 2024-04-05 | End: 2024-04-13 | Stop reason: SDUPTHER

## 2024-04-05 RX ORDER — LEVOTHYROXINE SODIUM 25 UG/1
25 TABLET ORAL
Qty: 90 TABLET | Refills: 0 | Status: SHIPPED | OUTPATIENT
Start: 2024-04-05 | End: 2024-04-13 | Stop reason: SDUPTHER

## 2024-04-05 NOTE — TELEPHONE ENCOUNTER
No care due was identified.  Health [facility] Embedded Care Due Messages. Reference number: [id number].   4/05/2024 8:18:55 AM CDT

## 2024-04-13 DIAGNOSIS — M25.562 CHRONIC PAIN OF LEFT KNEE: ICD-10-CM

## 2024-04-13 DIAGNOSIS — E03.9 HYPOTHYROIDISM, UNSPECIFIED TYPE: ICD-10-CM

## 2024-04-13 DIAGNOSIS — G89.29 CHRONIC PAIN OF LEFT KNEE: ICD-10-CM

## 2024-04-13 NOTE — PROGRESS NOTES
Subjective:       Patient ID: Thiago Thacker is a 74 y.o. male.    Chief Complaint: No chief complaint on file.      The patient is coming in for evaluation of possible narrowing in his throat.  He has sensation that his throat is closing.  He also has a chronic throat clearing and phlegm in the throat.  He feels that his voice is weakening and at times is raspy.  It is particularly so when he 1st starts talking but then clears with use of the voice.  He does have GERD symptoms.  He does not have significant allergy/sinus problems.  He was born and raised in [place] and in the [facility].  He has been in [place] for 3 years.          Review of Systems     Constitutional: Positive for fatigue.  Negative for appetite change, chills, fever and unexpected weight loss.      HENT: Positive for postnasal drip, trouble swallowing and voice change.  Negative for ear discharge, ear infection, ear pain, facial swelling, hearing loss, mouth sores, nosebleeds, ringing in the ears, runny nose, sinus infection, sinus pressure, sore throat, stuffy nose, tonsil infection and dental problems.  Frequent throat clearing   Phlegm in the throat      Eyes:  Negative for change in eyesight, eye drainage, eye itching and photophobia.     Respiratory:  Negative for cough, shortness of breath, sleep apnea, snoring and wheezing.      Cardiovascular:  Negative for chest pain, foot swelling, irregular heartbeat and swollen veins.     Gastrointestinal:  Negative for abdominal pain, acid reflux, constipation, diarrhea, heartburn and vomiting.     Genitourinary: Positive for frequent urination. Negative for difficulty urinating and sexual problems.     Musc: Positive for aching joints and back pain. Negative for aching muscles and neck pain.     Skin: Negative for rash.     Allergy: Negative for food allergies and seasonal allergies.     Endocrine: Negative for cold intolerance and heat intolerance.      Neurological: Negative for  dizziness, headaches, light-headedness, seizures and tremors.      Hematologic: Negative for bruises/bleeds easily and swollen glands.      Psychiatric: Negative for decreased concentration, depression, nervous/anxious and sleep disturbance.                Objective:      Physical Exam  Vitals and nursing note reviewed.   Constitutional:       General: He is awake.      Appearance: Normal appearance. He is well-developed, well-groomed and normal weight.   HENT:      Head: Normocephalic.      Jaw: There is normal jaw occlusion.      Salivary Glands: Right salivary gland is not diffusely enlarged or tender. Left salivary gland is not diffusely enlarged or tender.      Right Ear: Hearing, ear canal and external ear normal. Tympanic membrane is retracted.      Left Ear: Hearing, ear canal and external ear normal. Tympanic membrane is retracted.      Nose: Septal deviation (to the right), mucosal edema (cyanotic, boggy inferior turbinates bilaterally) and rhinorrhea (clear mucus bilaterally) present. No nasal deformity. Rhinorrhea is clear.      Right Turbinates: Enlarged and pale.      Left Turbinates: Enlarged and pale.      Mouth/Throat:      Lips: No lesions.      Mouth: No oral lesions.      Dentition: No gum lesions.      Tongue: No lesions.      Palate: No mass and lesions.      Pharynx: Oropharynx is clear. Uvula midline.   Eyes:      General: Lids are normal. Vision grossly intact.         Right eye: No discharge.         Left eye: No discharge.      Extraocular Movements: Extraocular movements intact.      Conjunctiva/sclera: Conjunctivae normal.      Pupils: Pupils are equal, round, and reactive to light.   Neck:      Thyroid: No thyroid mass or thyromegaly.      Trachea: Trachea normal. No tracheal deviation.   Cardiovascular:      Rate and Rhythm: Normal rate and regular rhythm.      Pulses: Normal pulses.      Heart sounds: Normal heart sounds.   Pulmonary:      Effort: Pulmonary effort is normal.       Breath sounds: Normal breath sounds. No stridor. No decreased breath sounds, wheezing, rhonchi or rales.   Abdominal:      General: Bowel sounds are normal.      Palpations: Abdomen is soft.      Tenderness: There is no abdominal tenderness.   Musculoskeletal:         General: Normal range of motion.      Cervical back: Normal range of motion and neck supple. No muscular tenderness.   Lymphadenopathy:      Head:      Right side of head: No submental, submandibular, preauricular, posterior auricular or occipital adenopathy.      Left side of head: No submental, submandibular, preauricular, posterior auricular or occipital adenopathy.      Cervical: No cervical adenopathy.   Skin:     General: Skin is warm and dry.      Findings: No petechiae or rash.      Nails: There is no clubbing.   Neurological:      Mental Status: He is alert and oriented to person, place, and time.      Cranial Nerves: No cranial nerve deficit.      Sensory: No sensory deficit.      Gait: Gait normal.   Psychiatric:         Speech: Speech normal.         Behavior: Behavior normal. Behavior is cooperative.         Thought Content: Thought content normal.         Judgment: Judgment normal.         Flexible video nasolaryngoscopy-nasal septal deviation to the right with nasal changes of allergic rhinitis; laryngeal changes consistent with laryngal pharyngeal reflux.    Laryngeal pictures:                        Nasopharynx/torus tubarius pictures:              Right nasal passage pictures:          Left nasal passage pictures:              Assessment:       1. Dysphagia, unspecified type    2. Hoarseness    3. Throat clearing    4. Phlegm in throat    5. Laryngopharyngeal reflux (LPR)    6. Nasal septal deviation    7. Allergic rhinitis, unspecified seasonality, unspecified trigger        Plan:       I am starting the patient on an anti-reflux diet.  He will elevate the headboard of his bed 3 in and refrain from eating for 2 hours before going to  bed.  I am placing him on pantoprazole twice daily and will check him in 6 weeks.  I did discuss referring him to Dr. Jack for evaluation.  He would like to see how this treatment works before I do that.                  DISCLAIMER: This note was prepared with [facility] voice recognition transcription software. Garbled syntax, mangled pronouns, and other bizarre constructions may be attributed to that software system. While efforts were made to correct any mistakes made by this voice recognition program, some errors and/or omissions may remain in the note that were missed when the note was originally created.

## 2024-04-14 DIAGNOSIS — R35.1 BENIGN PROSTATIC HYPERPLASIA WITH NOCTURIA: ICD-10-CM

## 2024-04-14 DIAGNOSIS — I25.2 HX OF MYOCARDIAL INFARCTION: ICD-10-CM

## 2024-04-14 DIAGNOSIS — N52.9 ERECTILE DYSFUNCTION, UNSPECIFIED ERECTILE DYSFUNCTION TYPE: ICD-10-CM

## 2024-04-14 DIAGNOSIS — N40.1 BENIGN PROSTATIC HYPERPLASIA WITH NOCTURIA: ICD-10-CM

## 2024-04-14 NOTE — TELEPHONE ENCOUNTER
Care Due:                  Date            Visit Type   Department     Provider  --------------------------------------------------------------------------------                                EP -                              PRIMARY      [facility] PR[facility]  Last Visit: 11-      CARE ([facility])   FRANKLIN Reyes  Next Visit: None Scheduled  None         None Found                                                            Last  Test          Frequency    Reason                     Performed    Due Date  --------------------------------------------------------------------------------    Lipid Panel.  12 months..  atorvastatin.............  07-   07-    Health [facility] Embedded Care Due Messages. Reference number: [id number].   4/14/2024 7:40:42 AM CDT   [Abdominal Pain] : abdominal pain [Constipation] : constipation [Diarrhea] : diarrhea [Negative] : Allergic/Immunologic [FreeTextEntry7] : mild abdominal cramping

## 2024-04-15 ENCOUNTER — OFFICE VISIT (OUTPATIENT)
Dept: OTOLARYNGOLOGY | Facility: CLINIC | Age: 74
End: 2024-04-15
Payer: COMMERCIAL

## 2024-04-15 VITALS
OXYGEN SATURATION: 97 % | BODY MASS INDEX: 26.63 KG/M2 | HEART RATE: 64 BPM | SYSTOLIC BLOOD PRESSURE: 118 MMHG | WEIGHT: 190.94 LBS | DIASTOLIC BLOOD PRESSURE: 78 MMHG

## 2024-04-15 DIAGNOSIS — J34.2 NASAL SEPTAL DEVIATION: ICD-10-CM

## 2024-04-15 DIAGNOSIS — K21.9 LARYNGOPHARYNGEAL REFLUX (LPR): ICD-10-CM

## 2024-04-15 DIAGNOSIS — R49.0 HOARSENESS: ICD-10-CM

## 2024-04-15 DIAGNOSIS — R13.10 DYSPHAGIA, UNSPECIFIED TYPE: Primary | ICD-10-CM

## 2024-04-15 DIAGNOSIS — R09.89 THROAT CLEARING: ICD-10-CM

## 2024-04-15 DIAGNOSIS — R09.89 PHLEGM IN THROAT: ICD-10-CM

## 2024-04-15 DIAGNOSIS — J30.9 ALLERGIC RHINITIS, UNSPECIFIED SEASONALITY, UNSPECIFIED TRIGGER: ICD-10-CM

## 2024-04-15 PROCEDURE — 99999 PR PBB SHADOW E&M-EST. PATIENT-LVL IV: CPT | Mod: PBBFAC,,, | Performed by: SPECIALIST

## 2024-04-15 PROCEDURE — 99204 OFFICE O/P NEW MOD 45 MIN: CPT | Mod: 25,S$GLB,, | Performed by: SPECIALIST

## 2024-04-15 PROCEDURE — 3288F FALL RISK ASSESSMENT DOCD: CPT | Mod: CPTII,S$GLB,, | Performed by: SPECIALIST

## 2024-04-15 PROCEDURE — 1101F PT FALLS ASSESS-DOCD LE1/YR: CPT | Mod: CPTII,S$GLB,, | Performed by: SPECIALIST

## 2024-04-15 PROCEDURE — 1125F AMNT PAIN NOTED PAIN PRSNT: CPT | Mod: CPTII,S$GLB,, | Performed by: SPECIALIST

## 2024-04-15 PROCEDURE — 3078F DIAST BP <80 MM HG: CPT | Mod: CPTII,S$GLB,, | Performed by: SPECIALIST

## 2024-04-15 PROCEDURE — 1160F RVW MEDS BY RX/DR IN RCRD: CPT | Mod: CPTII,S$GLB,, | Performed by: SPECIALIST

## 2024-04-15 PROCEDURE — 1159F MED LIST DOCD IN RCRD: CPT | Mod: CPTII,S$GLB,, | Performed by: SPECIALIST

## 2024-04-15 PROCEDURE — 3074F SYST BP LT 130 MM HG: CPT | Mod: CPTII,S$GLB,, | Performed by: SPECIALIST

## 2024-04-15 PROCEDURE — 3008F BODY MASS INDEX DOCD: CPT | Mod: CPTII,S$GLB,, | Performed by: SPECIALIST

## 2024-04-15 PROCEDURE — 31575 DIAGNOSTIC LARYNGOSCOPY: CPT | Mod: S$GLB,,, | Performed by: SPECIALIST

## 2024-04-15 RX ORDER — ATORVASTATIN CALCIUM 40 MG/1
40 TABLET, FILM COATED ORAL DAILY
Qty: 90 TABLET | Refills: 0 | Status: SHIPPED | OUTPATIENT
Start: 2024-04-15

## 2024-04-15 RX ORDER — LEVOTHYROXINE SODIUM 25 UG/1
25 TABLET ORAL
Qty: 90 TABLET | Refills: 0 | Status: SHIPPED | OUTPATIENT
Start: 2024-04-15

## 2024-04-15 RX ORDER — FINASTERIDE 5 MG/1
5 TABLET, FILM COATED ORAL DAILY
Qty: 90 TABLET | Refills: 0 | Status: SHIPPED | OUTPATIENT
Start: 2024-04-15

## 2024-04-15 RX ORDER — LEVOTHYROXINE SODIUM 200 UG/1
200 TABLET ORAL
Qty: 90 TABLET | Refills: 0 | Status: SHIPPED | OUTPATIENT
Start: 2024-04-15

## 2024-04-15 RX ORDER — TADALAFIL 5 MG/1
5 TABLET ORAL DAILY PRN
Qty: 90 TABLET | Refills: 0 | Status: SHIPPED | OUTPATIENT
Start: 2024-04-15

## 2024-04-15 NOTE — PROCEDURES
"Laryngoscopy    Date/Time: 4/15/2024 10:20 AM    Performed by: GERMÁN Felder MD  Authorized by: GERMÁN Felder MD    Time out: Immediately prior to procedure a "time out" was called to verify the correct patient, procedure, equipment, support staff and site/side marked as required.    Consent Done?:  Yes (Verbal)  Anesthesia:     Local anesthetic:  4% Xylocaine spray with Fidencio-Synephrine    Patient tolerance:  Patient tolerated the procedure well with no immediate complications    Decongestion performed?: Yes    Laryngoscopy:     Areas examined:  Larynx, hypopharynx, oropharynx, nasopharynx, nasal cavities and vocal cords    Laryngoscope size:  4 mm (Flexible video nasolaryngoscopy)  Nose External:      No external nasal deformity  Nose Intranasal:      Mucosa no polyps     Mucosa ulcers not present     No mucosa lesions present (clear mucus in the nasal passages bilaterally)     Septum gross deformity (septum deviated to the right)     Enlarged turbinates (inferior turbinates enlarged bilaterally)  Nasopharynx:      No mucosa lesions     Adenoids not present     Posterior choanae patent     Eustachian tube patent  Larynx/hypopharynx:      No epiglottis lesions     No epiglottis edema     No AE folds lesions     No vocal cord polyps     Equal and normal bilateral (vocal cords move symmetrically clear/white mucus at anterior commissure)     No hypopharynx lesions     No piriform sinus pooling     No piriform sinus lesions     Post cricoid edema (mild to moderate)     Post cricoid erythema (mild)     Flexible video nasolaryngoscopy-nasal septal deviation and nasal changes of allergic rhinitis; laryngeal changes consistent with laryngal pharyngeal reflux    "

## 2024-04-16 ENCOUNTER — TELEPHONE (OUTPATIENT)
Dept: OTOLARYNGOLOGY | Facility: CLINIC | Age: 74
End: 2024-04-16
Payer: COMMERCIAL

## 2024-04-16 RX ORDER — PANTOPRAZOLE SODIUM 40 MG/1
TABLET, DELAYED RELEASE ORAL
Qty: 60 TABLET | Refills: 11 | Status: SHIPPED | OUTPATIENT
Start: 2024-04-16

## 2024-04-16 RX ORDER — MELOXICAM 15 MG/1
15 TABLET ORAL DAILY
Qty: 30 TABLET | Refills: 0 | Status: SHIPPED | OUTPATIENT
Start: 2024-04-16

## 2024-04-17 RX ORDER — PANTOPRAZOLE SODIUM 40 MG/1
TABLET, DELAYED RELEASE ORAL
Qty: 60 TABLET | Refills: 11 | Status: SHIPPED | OUTPATIENT
Start: 2024-04-17

## 2024-04-26 ENCOUNTER — LAB VISIT (OUTPATIENT)
Dept: LAB | Facility: HOSPITAL | Age: 74
End: 2024-04-26
Attending: INTERNAL MEDICINE
Payer: COMMERCIAL

## 2024-04-26 ENCOUNTER — OFFICE VISIT (OUTPATIENT)
Dept: CARDIOLOGY | Facility: CLINIC | Age: 74
End: 2024-04-26
Payer: COMMERCIAL

## 2024-04-26 VITALS
OXYGEN SATURATION: 99 % | HEIGHT: 71 IN | WEIGHT: 196.19 LBS | BODY MASS INDEX: 27.47 KG/M2 | HEART RATE: 64 BPM | DIASTOLIC BLOOD PRESSURE: 70 MMHG | SYSTOLIC BLOOD PRESSURE: 138 MMHG

## 2024-04-26 DIAGNOSIS — R09.89 BRUIT OF LEFT CAROTID ARTERY: ICD-10-CM

## 2024-04-26 DIAGNOSIS — R07.9 CHEST PAIN, UNSPECIFIED TYPE: ICD-10-CM

## 2024-04-26 DIAGNOSIS — I25.10 ATHEROSCLEROSIS OF NATIVE CORONARY ARTERY OF NATIVE HEART WITHOUT ANGINA PECTORIS: ICD-10-CM

## 2024-04-26 DIAGNOSIS — I25.10 ATHEROSCLEROSIS OF NATIVE CORONARY ARTERY OF NATIVE HEART WITHOUT ANGINA PECTORIS: Primary | ICD-10-CM

## 2024-04-26 DIAGNOSIS — E78.00 HYPERCHOLESTEROLEMIA: ICD-10-CM

## 2024-04-26 LAB
CREAT SERPL-MCNC: 1.1 MG/DL (ref 0.5–1.4)
EST. GFR  (NO RACE VARIABLE): >60 ML/MIN/1.73 M^2

## 2024-04-26 PROCEDURE — 3008F BODY MASS INDEX DOCD: CPT | Mod: CPTII,S$GLB,, | Performed by: INTERNAL MEDICINE

## 2024-04-26 PROCEDURE — 1160F RVW MEDS BY RX/DR IN RCRD: CPT | Mod: CPTII,S$GLB,, | Performed by: INTERNAL MEDICINE

## 2024-04-26 PROCEDURE — 93000 ELECTROCARDIOGRAM COMPLETE: CPT | Mod: S$GLB,,, | Performed by: INTERNAL MEDICINE

## 2024-04-26 PROCEDURE — 99214 OFFICE O/P EST MOD 30 MIN: CPT | Mod: S$GLB,,, | Performed by: INTERNAL MEDICINE

## 2024-04-26 PROCEDURE — 36415 COLL VENOUS BLD VENIPUNCTURE: CPT | Mod: PN | Performed by: INTERNAL MEDICINE

## 2024-04-26 PROCEDURE — 3075F SYST BP GE 130 - 139MM HG: CPT | Mod: CPTII,S$GLB,, | Performed by: INTERNAL MEDICINE

## 2024-04-26 PROCEDURE — 99999 PR PBB SHADOW E&M-EST. PATIENT-LVL IV: CPT | Mod: PBBFAC,,, | Performed by: INTERNAL MEDICINE

## 2024-04-26 PROCEDURE — 3078F DIAST BP <80 MM HG: CPT | Mod: CPTII,S$GLB,, | Performed by: INTERNAL MEDICINE

## 2024-04-26 PROCEDURE — 1159F MED LIST DOCD IN RCRD: CPT | Mod: CPTII,S$GLB,, | Performed by: INTERNAL MEDICINE

## 2024-04-26 PROCEDURE — 82565 ASSAY OF CREATININE: CPT | Performed by: INTERNAL MEDICINE

## 2024-04-26 PROCEDURE — 1126F AMNT PAIN NOTED NONE PRSNT: CPT | Mod: CPTII,S$GLB,, | Performed by: INTERNAL MEDICINE

## 2024-04-26 NOTE — PROGRESS NOTES
[facility]    Chief Complaint  Chief Complaint   Patient presents with    Coronary Artery Disease       HPI:    He has been doing well.  No complaints.  Slowed down exercise because of bilateral knee injuries.    Medications  Current Outpatient Medications   Medication Sig Dispense Refill    aspirin (ECOTRIN) 81 MG EC tablet Take 81 mg by mouth once daily.      atorvastatin (LIPITOR) 40 MG tablet Take 1 tablet (40 mg total) by mouth once daily. 90 tablet 0    cholecalciferol, vitamin D3, (VITAMIN D3) 125 mcg (5,000 unit) Tab       finasteride (PROSCAR) 5 mg tablet Take 1 tablet (5 mg total) by mouth once daily. 90 tablet 0    hyaluronate sodium (HYALURONIC ACID, SODIUM, ORAL)       levothyroxine (SYNTHROID) 200 MCG tablet Take 1 tablet (200 mcg total) by mouth before breakfast. 90 tablet 0    levothyroxine (SYNTHROID) 25 MCG tablet Take 1 tablet (25 mcg total) by mouth before breakfast. 90 tablet 0    meloxicam (MOBIC) 15 MG tablet Take 1 tablet (15 mg total) by mouth once daily. 30 tablet 0    multivitamin (THERAGRAN) per tablet Take 1 tablet by mouth once daily.      omega-3/dha/epa/fish oil (OMEGA-3 FISH OIL ORAL) Take 4 g by mouth once daily.      pantoprazole (PROTONIX) 40 MG tablet One tablet by mouth twice daily, best if taken before meals 60 tablet 11    pantoprazole (PROTONIX) 40 MG tablet One tablet by mouth twice daily, best if taken before meals 60 tablet 11    sirolimus (RAPAMUNE) 1 MG Tab Take 10 mg by mouth once a week. Sat      soybean, fermented (NATTOKINASE) 50 mg Cap       tadalafiL (CIALIS) 5 MG tablet Take 1 tablet (5 mg total) by mouth daily as needed for Erectile Dysfunction. 90 tablet 0    triamcinolone acetonide 0.025% (KENALOG) 0.025 % cream Apply topically 2 (two) times daily as needed (Rash). 15 g 3    calcium carb-magnesium ox,carb (DAMIAN-MAG) 200 mg calcium- 100 mg Chew  (Patient not taking: Reported on 4/26/2024)       No current facility-administered medications for this  visit.     Facility-Administered Medications Ordered in Other Visits   Medication Dose Route Frequency Provider Last Rate Last Admin    balanced salt irrigation intra-ocular solution 1 drop  1 drop Right Eye On Call Procedure Nessa Romo MD        balanced salt irrigation intra-ocular solution 1 drop  1 drop Left Eye On Call Procedure Nessa Romo MD        phenylephrine HCL 10% ophthalmic solution 1 drop  1 drop Right Eye PRN Nessa Romo MD        phenylephrine HCL 10% ophthalmic solution 1 drop  1 drop Left Eye PRN Nessa Romo MD        proparacaine 0.5 % ophthalmic solution 1 drop  1 drop Left Eye PRN Nessa Romo MD        sodium chloride 0.9% flush 2 mL  2 mL Intravenous PRN Nessa Romo MD        sodium chloride 0.9% flush 2 mL  2 mL Intravenous PRN Nessa Romo MD        TETRAcaine HCl (PF) 0.5 % Drop 1 drop  1 drop Left Eye PRN Nessa Romo MD            History  Past Medical History:   Diagnosis Date    Coronary atherosclerosis     Headache     Hypercholesterolemia     Myocardial infarction 2011     Past Surgical History:   Procedure Laterality Date    BLEPHAROPLASTY      CATARACT EXTRACTION W/  INTRAOCULAR LENS IMPLANT Right 6/1/2023    Procedure: EXTRACTION, CATARACT, WITH IOL INSERTION;  Surgeon: Nessa Romo MD;  [facility];  Service: Ophthalmology;  Laterality: Right;    CATARACT EXTRACTION W/  INTRAOCULAR LENS IMPLANT Left 8/16/2023    Procedure: EXTRACTION, CATARACT, WITH IOL INSERTION;  Surgeon: Nessa Romo MD;  [facility];  Service: Ophthalmology;  Laterality: Left;    COLONOSCOPY N/A 10/9/2023    Procedure: COLONOSCOPY;  Surgeon: Miguel Richardson MD;  [facility];  Service: Endoscopy;  Laterality: N/A;  instructions sent to patient portal.TBou  referral: Dr. Reyes  pre call attempted, LV and sent portal message -CE    COMPLETE LASER PHOTOVAPORIZATION OF PROSTATE      CORONARY STENT PLACEMENT  2011     INGUINAL HERNIA REPAIR      ROBOT-ASSISTED LAPAROSCOPIC REPAIR OF INGUINAL HERNIA USING DA YRIS XI N/A 8/21/2023    Procedure: XI ROBOTIC REPAIR, HERNIA, INGUINAL, RIGHT (recurrent) w/ mesh;  Surgeon: Hussein Sy Jr., MD;  Location: [facility] OR [address] [place] [address];  Service: General;  Laterality: N/A;    vein       Social History     Socioeconomic History    Marital status: Single   Tobacco Use    Smoking status: Never    Smokeless tobacco: Never   Substance and Sexual Activity    Alcohol use: Yes     Comment: occasionally    Drug use: Never    Sexual activity: Not Currently     Partners: Female     Social Determinants of Health     Financial Resource Strain: Low Risk  (2/22/2024)    Overall Financial Resource Strain (CARDIA)     Difficulty of Paying Living Expenses: Not hard at all   Food Insecurity: No Food Insecurity (2/22/2024)    Hunger Vital Sign     Worried About Running Out of Food in the Last Year: Never true     Ran Out of Food in the Last Year: Never true   Transportation Needs: No Transportation Needs (2/22/2024)    PRAPARE - Transportation     Lack of Transportation (Medical): No     Lack of Transportation (Non-Medical): No   Physical Activity: Sufficiently Active (2/22/2024)    Exercise Vital Sign     Days of Exercise per Week: 7 days     Minutes of Exercise per Session: 30 min   Stress: No Stress Concern Present (2/22/2024)    [place] [employer] - Occupational Stress Questionnaire     Feeling of Stress : Only a little   Social Connections: Moderately Isolated (2/22/2024)    Social Connection and Isolation Panel [NHANES]     Frequency of Communication with Friends and Family: More than three times a week     Frequency of Social Gatherings with Friends and Family: More than three times a week     Attends Confucianism Services: Never     Active Member of Clubs or Organizations: No     Attends Club or Organization Meetings: 1 to 4 times per year     Marital Status: Never    Housing  Stability: Unknown (2/22/2024)    Housing Stability Vital Sign     Unable to Pay for Housing in the Last Year: No     Unstable Housing in the Last Year: No     Family History   Problem Relation Name Age of Onset    Colon cancer Mother  80    Heart attack Father  90        Allergies  Review of patient's allergies indicates:   Allergen Reactions    Celebrex [celecoxib] Rash       Review of Systems   Review of Systems   Constitutional: Negative for malaise/fatigue, weight gain and weight loss.   Eyes:  Negative for visual disturbance.   Cardiovascular:  Negative for chest pain, claudication, cyanosis, dyspnea on exertion, irregular heartbeat, leg swelling, near-syncope, orthopnea, palpitations, paroxysmal nocturnal dyspnea and syncope.   Respiratory:  Negative for cough, hemoptysis, shortness of breath, sleep disturbances due to breathing and wheezing.    Hematologic/Lymphatic: Negative for bleeding problem. Does not bruise/bleed easily.   Skin:  Negative for poor wound healing.   Musculoskeletal:  Negative for muscle cramps and myalgias.   Gastrointestinal:  Negative for abdominal pain, anorexia, diarrhea, heartburn, hematemesis, hematochezia, melena, nausea and vomiting.   Genitourinary:  Negative for hematuria and nocturia.   Neurological:  Negative for excessive daytime sleepiness, dizziness, focal weakness, light-headedness and weakness.       Physical Exam  Vitals:    04/26/24 0843   BP: 138/70   Pulse: 64     Wt Readings from Last 1 Encounters:   04/26/24 89 kg (196 lb 3.4 oz)     Physical Exam  Vitals and nursing note reviewed.   Constitutional:       General: He is not in acute distress.     Appearance: He is not toxic-appearing or diaphoretic.   HENT:      Head: Normocephalic and atraumatic.      Mouth/Throat:      Lips: Pink.      Mouth: Mucous membranes are moist.   Eyes:      General: No scleral icterus.     Conjunctiva/sclera: Conjunctivae normal.   Neck:      Thyroid: No thyromegaly.      Vascular: No  hepatojugular reflux or JVD.      Trachea: Trachea normal.   Cardiovascular:      Rate and Rhythm: Normal rate and regular rhythm. No extrasystoles are present.     Chest Wall: PMI is not displaced.      Pulses:           Carotid pulses are 2+ on the right side and 2+ on the left side with bruit.       Radial pulses are 2+ on the right side and 2+ on the left side.        Dorsalis pedis pulses are 2+ on the right side and 2+ on the left side.        Posterior tibial pulses are 2+ on the right side and 2+ on the left side.      Heart sounds: S1 normal and S2 normal. No murmur heard.     No friction rub. No S3 or S4 sounds.   Pulmonary:      Effort: Pulmonary effort is normal. No tachypnea, bradypnea, accessory muscle usage or respiratory distress.      Breath sounds: Normal breath sounds and air entry. No decreased breath sounds, wheezing, rhonchi or rales.   Abdominal:      General: Bowel sounds are normal. There is no distension or abdominal bruit.      Palpations: Abdomen is soft. There is no hepatomegaly, splenomegaly or pulsatile mass.      Tenderness: There is no abdominal tenderness.   Musculoskeletal:         General: No tenderness or deformity.      Right lower leg: No edema.      Left lower leg: No edema.   Skin:     General: Skin is warm and dry.      Capillary Refill: Capillary refill takes less than 2 seconds.      Coloration: Skin is not cyanotic or pale.      Nails: There is no clubbing.   Neurological:      General: No focal deficit present.      Mental Status: He is alert and oriented to person, place, and time.   Psychiatric:         Attention and Perception: Attention normal.         Mood and Affect: Mood normal.         Speech: Speech normal.         Behavior: Behavior normal. Behavior is cooperative.         Labs  Lab Visit on 04/03/2024   Component Date Value Ref Range Status    TSH 04/03/2024 0.959  0.400 - 4.000 uIU/mL Final   Lab Visit on 03/12/2024   Component Date Value Ref Range Status     TSH 03/12/2024 4.178 (H)  0.400 - 4.000 uIU/mL Final    Free T4 03/12/2024 1.02  0.71 - 1.51 ng/dL Final   Lab Visit on 02/01/2024   Component Date Value Ref Range Status    TSH 02/01/2024 4.371 (H)  0.400 - 4.000 uIU/mL Final    Free T4 02/01/2024 0.85  0.71 - 1.51 ng/dL Final   Lab Visit on 01/02/2024   Component Date Value Ref Range Status    TSH 01/02/2024 5.946 (H)  0.400 - 4.000 uIU/mL Final    Free T4 01/02/2024 0.91  0.71 - 1.51 ng/dL Final   Lab Visit on 11/15/2023   Component Date Value Ref Range Status    WBC 11/15/2023 3.53 (L)  3.90 - 12.70 K/uL Final    RBC 11/15/2023 4.61  4.60 - 6.20 M/uL Final    Hemoglobin 11/15/2023 14.1  14.0 - 18.0 g/dL Final    Hematocrit 11/15/2023 42.7  40.0 - 54.0 % Final    MCV 11/15/2023 93  82 - 98 fL Final    MCH 11/15/2023 30.6  27.0 - 31.0 pg Final    MCHC 11/15/2023 33.0  32.0 - 36.0 g/dL Final    RDW 11/15/2023 12.9  11.5 - 14.5 % Final    Platelets 11/15/2023 131 (L)  150 - 450 K/uL Final    MPV 11/15/2023 11.7  9.2 - 12.9 fL Final    Immature Granulocytes 11/15/2023 0.3  0.0 - 0.5 % Final    Gran # (ANC) 11/15/2023 2.4  1.8 - 7.7 K/uL Final    Immature Grans (Abs) 11/15/2023 0.01  0.00 - 0.04 K/uL Final    Comment: Mild elevation in immature granulocytes is non specific and   can be seen in a variety of conditions including stress response,   acute inflammation, trauma and pregnancy. Correlation with other   laboratory and clinical findings is essential.      Lymph # 11/15/2023 0.6 (L)  1.0 - 4.8 K/uL Final    Mono # 11/15/2023 0.4  0.3 - 1.0 K/uL Final    Eos # 11/15/2023 0.1  0.0 - 0.5 K/uL Final    Baso # 11/15/2023 0.03  0.00 - 0.20 K/uL Final    nRBC 11/15/2023 0  0 /100 WBC Final    Gran % 11/15/2023 67.2  38.0 - 73.0 % Final    Lymph % 11/15/2023 18.1  18.0 - 48.0 % Final    Mono % 11/15/2023 11.3  4.0 - 15.0 % Final    Eosinophil % 11/15/2023 2.3  0.0 - 8.0 % Final    Basophil % 11/15/2023 0.8  0.0 - 1.9 % Final    Differential Method 11/15/2023 Automated    "Final    Sodium 11/15/2023 142  136 - 145 mmol/L Final    Potassium 11/15/2023 4.2  3.5 - 5.1 mmol/L Final    Chloride 11/15/2023 106  95 - 110 mmol/L Final    CO2 11/15/2023 27  23 - 29 mmol/L Final    Glucose 11/15/2023 87  70 - 110 mg/dL Final    BUN 11/15/2023 19  8 - 23 mg/dL Final    Creatinine 11/15/2023 1.0  0.5 - 1.4 mg/dL Final    Calcium 11/15/2023 9.5  8.7 - 10.5 mg/dL Final    Anion Gap 11/15/2023 9  8 - 16 mmol/L Final    eGFR 11/15/2023 >60.0  >60 mL/min/1.73 m^2 Final    TSH 11/15/2023 5.110 (H)  0.400 - 4.000 uIU/mL Final    PSA, Screen 11/15/2023 1.1  0.00 - 4.00 ng/mL Final    Comment: The testing method is a chemiluminescent microparticle immunoassay   manufactured by [employer] and performed on the [employer]   or   [employer] system. Values obtained with different assay manufacturers   for   methods may be different and cannot be used interchangeably.  PSA Expected levels:  Hormonal Therapy: <0.05 ng/ml  Prostatectomy: <0.01 ng/ml  Radiation Therapy: <1.00 ng/ml      Hemoglobin A1C 11/15/2023 5.5  4.0 - 5.6 % Final    Comment: ADA Screening Guidelines:  5.7-6.4%  Consistent with prediabetes  >or=6.5%  Consistent with diabetes    High levels of fetal hemoglobin interfere with the HbA1C  assay. Heterozygous hemoglobin variants (HbS, HgC, etc)do  not significantly interfere with this assay.   However, presence of multiple variants may affect accuracy.      Estimated Avg Glucose 11/15/2023 111  68 - 131 mg/dL Final    Free T4 11/15/2023 0.85  0.71 - 1.51 ng/dL Final       Imaging  No results found.    Assessment  1. Atherosclerosis of native coronary artery of native heart without angina pectoris  - CTA Cardiac; Future  - Creatinine, serum; Future    2. Hypercholesterolemia  Recent lipid profile is under "Messages"  - CV Ultrasound Bilateral Doppler Carotid; Future    3. Chest pain, unspecified type  - CTA Cardiac; Future    4. Bruit of left carotid artery  - CV Ultrasound Bilateral Doppler " Carotid; Future      Plan and Discussion    Testing as above with further planning based on those results.  No change to present management for now.    The ASCVD Risk score (Kacie DK, et al., 2019) failed to calculate for the following reasons:    The patient has a prior MI or stroke diagnosis     Follow Up  Follow up in about 1 year (around 4/26/2025).      Todd Montenegro MD

## 2024-04-29 LAB
OHS QRS DURATION: 100 MS
OHS QTC CALCULATION: 398 MS

## 2024-05-03 ENCOUNTER — TELEPHONE (OUTPATIENT)
Dept: CARDIOLOGY | Facility: HOSPITAL | Age: 74
End: 2024-05-03
Payer: COMMERCIAL

## 2024-05-03 ENCOUNTER — PATIENT MESSAGE (OUTPATIENT)
Dept: CARDIOLOGY | Facility: HOSPITAL | Age: 74
End: 2024-05-03
Payer: COMMERCIAL

## 2024-05-03 RX ORDER — METOPROLOL TARTRATE 50 MG/1
50 TABLET ORAL
COMMUNITY
End: 2024-05-03 | Stop reason: SDUPTHER

## 2024-05-03 NOTE — TELEPHONE ENCOUNTER
Attempted to call patient in regards to their upcoming Cardiac CTA scheduled for 05/03/2024 at 12:00.     Reminder for the patient to fast for 4-hours prior to the test, no caffeine the AM of, and can have water.     For questions or concerns: I am available M-F 7:30-4, please call [phone]. Thank you!

## 2024-05-04 RX ORDER — METOPROLOL TARTRATE 50 MG/1
50 TABLET ORAL
Qty: 1 TABLET | Refills: 0 | Status: SHIPPED | OUTPATIENT
Start: 2024-05-04

## 2024-05-06 ENCOUNTER — HOSPITAL ENCOUNTER (OUTPATIENT)
Dept: RADIOLOGY | Facility: HOSPITAL | Age: 74
Discharge: HOME | End: 2024-05-06
Attending: INTERNAL MEDICINE
Payer: COMMERCIAL

## 2024-05-06 ENCOUNTER — HOSPITAL ENCOUNTER (OUTPATIENT)
Dept: CARDIOLOGY | Facility: HOSPITAL | Age: 74
Discharge: HOME | End: 2024-05-06
Attending: INTERNAL MEDICINE
Payer: COMMERCIAL

## 2024-05-06 DIAGNOSIS — R07.9 CHEST PAIN, UNSPECIFIED TYPE: ICD-10-CM

## 2024-05-06 DIAGNOSIS — I25.10 ATHEROSCLEROSIS OF NATIVE CORONARY ARTERY OF NATIVE HEART WITHOUT ANGINA PECTORIS: ICD-10-CM

## 2024-05-06 DIAGNOSIS — R09.89 BRUIT OF LEFT CAROTID ARTERY: ICD-10-CM

## 2024-05-06 DIAGNOSIS — E78.00 HYPERCHOLESTEROLEMIA: ICD-10-CM

## 2024-05-06 LAB
LEFT CBA DIAS: 20 CM/S
LEFT CBA SYS: 67 CM/S
LEFT CCA DIST DIAS: 19 CM/S
LEFT CCA DIST SYS: 80 CM/S
LEFT CCA MID DIAS: 21 CM/S
LEFT CCA MID SYS: 89 CM/S
LEFT CCA PROX DIAS: 20 CM/S
LEFT CCA PROX SYS: 76 CM/S
LEFT ECA DIAS: 22 CM/S
LEFT ECA SYS: 87 CM/S
LEFT ICA DIST DIAS: 32 CM/S
LEFT ICA DIST SYS: 82 CM/S
LEFT ICA MID DIAS: 25 CM/S
LEFT ICA MID SYS: 69 CM/S
LEFT ICA PROX DIAS: 10 CM/S
LEFT ICA PROX SYS: 44 CM/S
LEFT VERTEBRAL DIAS: 20 CM/S
LEFT VERTEBRAL SYS: 53 CM/S
OHS CV CAROTID RIGHT ICA EDV HIGHEST: 32
OHS CV CAROTID ULTRASOUND LEFT ICA/CCA RATIO: 1.03
OHS CV CAROTID ULTRASOUND RIGHT ICA/CCA RATIO: 0.98
OHS CV PV CAROTID LEFT HIGHEST CCA: 89
OHS CV PV CAROTID LEFT HIGHEST ICA: 82
OHS CV PV CAROTID RIGHT HIGHEST CCA: 97
OHS CV PV CAROTID RIGHT HIGHEST ICA: 84
OHS CV US CAROTID LEFT HIGHEST EDV: 32
RIGHT ARM DIASTOLIC BLOOD PRESSURE: 69 MMHG
RIGHT ARM SYSTOLIC BLOOD PRESSURE: 126 MMHG
RIGHT CBA DIAS: 22 CM/S
RIGHT CBA SYS: 73 CM/S
RIGHT CCA DIST DIAS: 28 CM/S
RIGHT CCA DIST SYS: 86 CM/S
RIGHT CCA MID DIAS: 24 CM/S
RIGHT CCA MID SYS: 83 CM/S
RIGHT CCA PROX DIAS: 19 CM/S
RIGHT CCA PROX SYS: 97 CM/S
RIGHT ECA DIAS: 15 CM/S
RIGHT ECA SYS: 93 CM/S
RIGHT ICA DIST DIAS: 21 CM/S
RIGHT ICA DIST SYS: 60 CM/S
RIGHT ICA MID DIAS: 32 CM/S
RIGHT ICA MID SYS: 84 CM/S
RIGHT ICA PROX DIAS: 22 CM/S
RIGHT ICA PROX SYS: 64 CM/S
RIGHT VERTEBRAL DIAS: 18 CM/S
RIGHT VERTEBRAL SYS: 51 CM/S

## 2024-05-06 PROCEDURE — 93880 EXTRACRANIAL BILAT STUDY: CPT | Mod: 26,,, | Performed by: INTERNAL MEDICINE

## 2024-05-06 PROCEDURE — 75571 CT HRT W/O DYE W/CA TEST: CPT | Mod: 26,,, | Performed by: RADIOLOGY

## 2024-05-06 PROCEDURE — 75571 CT HRT W/O DYE W/CA TEST: CPT | Mod: TC

## 2024-05-06 PROCEDURE — 93880 EXTRACRANIAL BILAT STUDY: CPT

## 2024-05-17 ENCOUNTER — TELEPHONE (OUTPATIENT)
Dept: CARDIOLOGY | Facility: CLINIC | Age: 74
End: 2024-05-17
Payer: COMMERCIAL

## 2024-05-17 ENCOUNTER — OFFICE VISIT (OUTPATIENT)
Dept: CARDIOLOGY | Facility: CLINIC | Age: 74
End: 2024-05-17
Payer: COMMERCIAL

## 2024-05-17 VITALS
BODY MASS INDEX: 26.18 KG/M2 | HEART RATE: 60 BPM | HEIGHT: 71 IN | WEIGHT: 187 LBS | OXYGEN SATURATION: 99 % | DIASTOLIC BLOOD PRESSURE: 79 MMHG | SYSTOLIC BLOOD PRESSURE: 138 MMHG

## 2024-05-17 DIAGNOSIS — E78.41 ELEVATED LIPOPROTEIN(A): ICD-10-CM

## 2024-05-17 DIAGNOSIS — I25.119 ATHEROSCLEROSIS OF NATIVE CORONARY ARTERY OF NATIVE HEART WITH ANGINA PECTORIS: Primary | ICD-10-CM

## 2024-05-17 DIAGNOSIS — E78.00 HYPERCHOLESTEROLEMIA: ICD-10-CM

## 2024-05-17 PROCEDURE — 1160F RVW MEDS BY RX/DR IN RCRD: CPT | Mod: CPTII,S$GLB,, | Performed by: INTERNAL MEDICINE

## 2024-05-17 PROCEDURE — 1126F AMNT PAIN NOTED NONE PRSNT: CPT | Mod: CPTII,S$GLB,, | Performed by: INTERNAL MEDICINE

## 2024-05-17 PROCEDURE — 3008F BODY MASS INDEX DOCD: CPT | Mod: CPTII,S$GLB,, | Performed by: INTERNAL MEDICINE

## 2024-05-17 PROCEDURE — 3075F SYST BP GE 130 - 139MM HG: CPT | Mod: CPTII,S$GLB,, | Performed by: INTERNAL MEDICINE

## 2024-05-17 PROCEDURE — 3078F DIAST BP <80 MM HG: CPT | Mod: CPTII,S$GLB,, | Performed by: INTERNAL MEDICINE

## 2024-05-17 PROCEDURE — 99999 PR PBB SHADOW E&M-EST. PATIENT-LVL IV: CPT | Mod: PBBFAC,,, | Performed by: INTERNAL MEDICINE

## 2024-05-17 PROCEDURE — 1159F MED LIST DOCD IN RCRD: CPT | Mod: CPTII,S$GLB,, | Performed by: INTERNAL MEDICINE

## 2024-05-17 PROCEDURE — 99213 OFFICE O/P EST LOW 20 MIN: CPT | Mod: S$GLB,,, | Performed by: INTERNAL MEDICINE

## 2024-05-17 NOTE — PROGRESS NOTES
[facility]    Chief Complaint  Chief Complaint   Patient presents with    Coronary Artery Disease    Risk Factor Management For Atherosclerosis       HPI:    Returns to discuss his calcium score which was performed in lieu of a CTA.  He wants to try a PCSK9 inhibitor to lower his lipoprotein a level.    Medications  Current Outpatient Medications   Medication Sig Dispense Refill    aspirin (ECOTRIN) 81 MG EC tablet Take 81 mg by mouth once daily.      atorvastatin (LIPITOR) 40 MG tablet Take 1 tablet (40 mg total) by mouth once daily. 90 tablet 0    cholecalciferol, vitamin D3, (VITAMIN D3) 125 mcg (5,000 unit) Tab       finasteride (PROSCAR) 5 mg tablet Take 1 tablet (5 mg total) by mouth once daily. 90 tablet 0    hyaluronate sodium (HYALURONIC ACID, SODIUM, ORAL)       levothyroxine (SYNTHROID) 200 MCG tablet Take 1 tablet (200 mcg total) by mouth before breakfast. 90 tablet 0    levothyroxine (SYNTHROID) 25 MCG tablet Take 1 tablet (25 mcg total) by mouth before breakfast. 90 tablet 0    meloxicam (MOBIC) 15 MG tablet Take 1 tablet (15 mg total) by mouth once daily. 30 tablet 0    metoprolol tartrate (LOPRESSOR) 50 MG tablet Take 1 tablet (50 mg total) by mouth On call Procedure (take 1-hour prior to cardiac CTA). 1 tablet 0    multivitamin (THERAGRAN) per tablet Take 1 tablet by mouth once daily.      omega-3/dha/epa/fish oil (OMEGA-3 FISH OIL ORAL) Take 4 g by mouth once daily.      pantoprazole (PROTONIX) 40 MG tablet One tablet by mouth twice daily, best if taken before meals 60 tablet 11    pantoprazole (PROTONIX) 40 MG tablet One tablet by mouth twice daily, best if taken before meals 60 tablet 11    sirolimus (RAPAMUNE) 1 MG Tab Take 10 mg by mouth once a week. Sat      soybean, fermented (NATTOKINASE) 50 mg Cap       tadalafiL (CIALIS) 5 MG tablet Take 1 tablet (5 mg total) by mouth daily as needed for Erectile Dysfunction. 90 tablet 0    triamcinolone acetonide 0.025% (KENALOG) 0.025 %  cream Apply topically 2 (two) times daily as needed (Rash). 15 g 3    calcium carb-magnesium ox,carb (DAMIAN-MAG) 200 mg calcium- 100 mg Chew  (Patient not taking: Reported on 4/26/2024)      evolocumab (REPATHA SURECLICK) 140 mg/mL PnIj Inject 1 mL (140 mg total) into the skin every 14 (fourteen) days. 2 mL 11     No current facility-administered medications for this visit.     Facility-Administered Medications Ordered in Other Visits   Medication Dose Route Frequency Provider Last Rate Last Admin    balanced salt irrigation intra-ocular solution 1 drop  1 drop Right Eye On Call Procedure Nessa Romo MD        balanced salt irrigation intra-ocular solution 1 drop  1 drop Left Eye On Call Procedure Nessa Romo MD        phenylephrine HCL 10% ophthalmic solution 1 drop  1 drop Right Eye PRN Nessa Romo MD        phenylephrine HCL 10% ophthalmic solution 1 drop  1 drop Left Eye PRN Nessa Romo MD        proparacaine 0.5 % ophthalmic solution 1 drop  1 drop Left Eye PRN Nessa Romo MD        sodium chloride 0.9% flush 2 mL  2 mL Intravenous PRN Nessa Romo MD        sodium chloride 0.9% flush 2 mL  2 mL Intravenous PRN Nessa Romo MD        TETRAcaine HCl (PF) 0.5 % Drop 1 drop  1 drop Left Eye PRN Nessa Romo MD            History  Past Medical History:   Diagnosis Date    Coronary atherosclerosis     Headache     Hypercholesterolemia     Myocardial infarction 2011     Past Surgical History:   Procedure Laterality Date    BLEPHAROPLASTY      CATARACT EXTRACTION W/  INTRAOCULAR LENS IMPLANT Right 6/1/2023    Procedure: EXTRACTION, CATARACT, WITH IOL INSERTION;  Surgeon: Nessa Romo MD;  Location: [facility] OR;  Service: Ophthalmology;  Laterality: Right;    CATARACT EXTRACTION W/  INTRAOCULAR LENS IMPLANT Left 8/16/2023    Procedure: EXTRACTION, CATARACT, WITH IOL INSERTION;  Surgeon: Nessa Romo MD;  Location: [facility] OR;  Service: Ophthalmology;  Laterality:  Left;    COLONOSCOPY N/A 10/9/2023    Procedure: COLONOSCOPY;  Surgeon: Miguel Richardson MD;  Location: [facility] ENDOSCOPY;  Service: Endoscopy;  Laterality: N/A;  instructions sent to patient portal.TB  referral: Dr. Reyes  pre call attempted, LV and sent portal message -CE    COMPLETE LASER PHOTOVAPORIZATION OF PROSTATE      CORONARY STENT PLACEMENT  2011    INGUINAL HERNIA REPAIR      ROBOT-ASSISTED LAPAROSCOPIC REPAIR OF INGUINAL HERNIA USING DA YRIS XI N/A 8/21/2023    Procedure: XI ROBOTIC REPAIR, HERNIA, INGUINAL, RIGHT (recurrent) w/ mesh;  Surgeon: Hussein Sy Jr., MD;  Location: [facility] OR [address] [place] [address];  Service: General;  Laterality: N/A;    vein       Social History     Socioeconomic History    Marital status: Single   Tobacco Use    Smoking status: Never    Smokeless tobacco: Never   Substance and Sexual Activity    Alcohol use: Yes     Comment: occasionally    Drug use: Never    Sexual activity: Not Currently     Partners: Female     Social Determinants of Health     Financial Resource Strain: Low Risk  (2/22/2024)    Overall Financial Resource Strain (CARDIA)     Difficulty of Paying Living Expenses: Not hard at all   Food Insecurity: No Food Insecurity (2/22/2024)    Hunger Vital Sign     Worried About Running Out of Food in the Last Year: Never true     Ran Out of Food in the Last Year: Never true   Transportation Needs: No Transportation Needs (2/22/2024)    PRAPARE - Transportation     Lack of Transportation (Medical): No     Lack of Transportation (Non-Medical): No   Physical Activity: Sufficiently Active (2/22/2024)    Exercise Vital Sign     Days of Exercise per Week: 7 days     Minutes of Exercise per Session: 30 min   Stress: No Stress Concern Present (2/22/2024)    [employer] - Occupational Stress Questionnaire     Feeling of Stress : Only a little   Housing Stability: Unknown (2/22/2024)    Housing Stability Vital Sign     Unable to Pay for Housing in the  Last Year: No     Unstable Housing in the Last Year: No     Family History   Problem Relation Name Age of Onset    Colon cancer Mother  80    Heart attack Father  90        Allergies  Review of patient's allergies indicates:   Allergen Reactions    Celebrex [celecoxib] Rash       Review of Systems   ROS    Physical Exam  Vitals:    05/17/24 0830   BP: 138/79   Pulse: 60     Wt Readings from Last 1 Encounters:   05/17/24 84.8 kg (187 lb)     Physical Exam    Labs  Hospital Outpatient Visit on 05/06/2024   Component Date Value Ref Range Status    Right CCA prox sys 05/06/2024 97  cm/s Final    Right CCA prox castillo 05/06/2024 19  cm/s Final    Right CCA mid sys 05/06/2024 83  cm/s Final    Right CCA mid castillo 05/06/2024 24  cm/s Final    Right CCA dist sys 05/06/2024 86  cm/s Final    Right CCA dist castillo 05/06/2024 28  cm/s Final    Rigth CBA sys 05/06/2024 73  cm/s Final    Right CBA castillo 05/06/2024 22  cm/s Final    Right ICA prox sys 05/06/2024 64  cm/s Final    Right ICA prox castillo 05/06/2024 22  cm/s Final    Right ICA mid sys 05/06/2024 84  cm/s Final    Right ICA mid castillo 05/06/2024 32  cm/s Final    Right ICA dist sys 05/06/2024 60  cm/s Final    Right ICA dist castillo 05/06/2024 21  cm/s Final    Right ECA sys 05/06/2024 93  cm/s Final    Right ECA castillo 05/06/2024 15  cm/s Final    Right vertebral sys 05/06/2024 51  cm/s Final    Right vertebral castillo 05/06/2024 18  cm/s Final    Right ICA/CCA ratio 05/06/2024 0.98   Final    Right Highest ICA 05/06/2024 84.00   Final    Right Highest EDV 05/06/2024 32.00   Final    Right Highest CCA 05/06/2024 97   Final    Left CCA prox sys 05/06/2024 76  cm/s Final    Left CCA prox castillo 05/06/2024 20  cm/s Final    Left CCA mid sys 05/06/2024 89  cm/s Final    Left CCA mid castillo 05/06/2024 21  cm/s Final    Left CCA dist sys 05/06/2024 80  cm/s Final    Left CCA dist castillo 05/06/2024 19  cm/s Final    Left CBA sys 05/06/2024 67  cm/s Final    Left CBA castillo 05/06/2024 20  cm/s Final     Left ICA prox sys 05/06/2024 44  cm/s Final    Left ICA prox castillo 05/06/2024 10  cm/s Final    Left ICA mid sys 05/06/2024 69  cm/s Final    Left ICA mid castillo 05/06/2024 25  cm/s Final    Left ICA dist sys 05/06/2024 82  cm/s Final    Left ICA dist castillo 05/06/2024 32  cm/s Final    Left ECA sys 05/06/2024 87  cm/s Final    Left ECA castillo 05/06/2024 22  cm/s Final    Left vertebral sys 05/06/2024 53  cm/s Final    Left vertebral castillo 05/06/2024 20  cm/s Final    Left ICA/CCA ratio 05/06/2024 1.03   Final    Left Highest ICA 05/06/2024 82.00   Final    LT Highest EDV 05/06/2024 32.00   Final    Left Highest CCA 05/06/2024 89   Final    Right arm systolic blood pressure 05/06/2024 126.00  mmHg Final    Right arm diastolic blood pressure 05/06/2024 69.00  mmHg Final   Lab Visit on 04/26/2024   Component Date Value Ref Range Status    Creatinine 04/26/2024 1.1  0.5 - 1.4 mg/dL Final    eGFR 04/26/2024 >60.0  >60 mL/min/1.73 m^2 Final   Office Visit on 04/26/2024   Component Date Value Ref Range Status    QRS Duration 04/26/2024 100  ms Final    [facility] QTC Calculation 04/26/2024 398  ms Final   Lab Visit on 04/03/2024   Component Date Value Ref Range Status    TSH 04/03/2024 0.959  0.400 - 4.000 uIU/mL Final   Lab Visit on 03/12/2024   Component Date Value Ref Range Status    TSH 03/12/2024 4.178 (H)  0.400 - 4.000 uIU/mL Final    Free T4 03/12/2024 1.02  0.71 - 1.51 ng/dL Final   Lab Visit on 02/01/2024   Component Date Value Ref Range Status    TSH 02/01/2024 4.371 (H)  0.400 - 4.000 uIU/mL Final    Free T4 02/01/2024 0.85  0.71 - 1.51 ng/dL Final   Lab Visit on 01/02/2024   Component Date Value Ref Range Status    TSH 01/02/2024 5.946 (H)  0.400 - 4.000 uIU/mL Final    Free T4 01/02/2024 0.91  0.71 - 1.51 ng/dL Final       Imaging  CV Ultrasound Bilateral Doppler Carotid    Result Date: 5/6/2024    There is less than 50% right Internal Carotid Stenosis.   There is less than 50% left Internal Carotid Stenosis.   There is  antegrade flow in the vertebral arteries bilaterally.     CT Cardiac Scoring    Result Date: 5/6/2024  EXAMINATION: CT CALCIUM SCORING CARDIAC CLINICAL HISTORY: Chest pain/anginal equiv, intermediate CAD risk, not treadmill candidate; Atherosclerotic heart disease of native coronary artery without angina pectoris TECHNIQUE: The chest was surveyed from above the pulmonary valve through the posterior wall of the left ventricle.  Images were reviewed in axial targeted reformatted images centered on the heart, and in full field of view for axial, sagittal and coronal planes. COMPARISON: Chest x-ray 08/17/2023 FINDINGS: Quantitative assessment of coronary artery calcium was performed with dedicated technique including full field of view and targeted reformatted images. Overall Agatston calcium score equals 2967. Agatston scores for individual coronary arteries are as follows: Left main: 62 Left anterior descending (LAD): 943 Left circumflex: 859 Right coronary artery (RCA): 1103 In this 74-year old man this score translates to the 95th percentile for coronary calcium load according to age and gender. Standard interpretation for a calcium score of 2967 follows: This indicates: Extensive plaque burden. Very high cardiovascular disease risk. Clinical interpretation: High likelihood of at least one 'significant' coronary stenosis. Gender and age issues: Findings are of greater clinical significance when the score is above the 75th percentile for age and sex or if calcium is present in 2 or more vessels. Recommended clinical action: Very aggressive risk factor modification using NCEP guidelines as for established CAD. Consider non-invasive stress test to rule out ischemia. Additional findings: Airways are patent. Several calcified and noncalcified pulmonary micro nodules (for example series 604, image 31, 61).  Heart is normal size.  No pericardial effusion.  Partially visualized upper abdomen is without significant  abnormality.  Degenerative changes of the spine.     1. Agatston calcium score equals 2967, which translates to the 95th percentile for coronary calcium load based on age and sex. 2. Several bilateral pulmonary micro nodules.  Per Fleischner Society guidelines for nodule <6mm; in a low risk patient, no follow-up recommended.  In a high risk patient/smoker, consider 12 month CT chest follow-up to exclude neoplasia. If stable at that time, no further follow-up needed. Calcium Score Interpretation 0 - Negative examination; no identifiable atherosclerotic plaque. Very low risk of cardiac events in the next 5 years. 1-10 - Minimal plaque burden. Low risk of cardiac events in the next 5 years.  - Mild plaque burden. Mild risk of cardiac events in the next 5 years. 101-400 - Moderate plaque burden. Moderate risk of cardiac events in the next 5 years. Over 400 - Extensive plaque burden. High risk of cardiac events in the next 5 years. Percentile Ranking = meaning that in a published study, of people of the same gender and similar age had the same or lower calcium score. Additional findings and recommendations above. Electronically signed by resident: Amilcar Pang Date:    05/06/2024 Time:    13:56 Electronically signed by: Giorgi Campos Date:    05/06/2024 Time:    14:11      Assessment  1. Atherosclerosis of native coronary artery of native heart with angina pectoris  - Cardiac PET Scan Stress; Future  - evolocumab (REPATHA SURECLICK) 140 mg/mL PnIj; Inject 1 mL (140 mg total) into the skin every 14 (fourteen) days.  Dispense: 2 mL; Refill: 11    2. Hypercholesterolemia  - evolocumab (REPATHA SURECLICK) 140 mg/mL PnIj; Inject 1 mL (140 mg total) into the skin every 14 (fourteen) days.  Dispense: 2 mL; Refill: 11      Plan and Discussion    Trial of Repatha to see what it does.  PET scan to assess for ischemia and microvascular dysfunction.          Todd Montenegro MD

## 2024-05-17 NOTE — TELEPHONE ENCOUNTER
----- Message from Elizabeth Heard sent at 5/17/2024  9:45 AM CDT -----  Regarding: Missed Call  Contact: [phone]  CONSULT/ADVISORY    Name of Caller:  BLANCA CLAY [[id number]]    Contact Preference:   [phone]    Nature of Call:  Pt states he had a missed call from this office.  Please call.

## 2024-05-28 ENCOUNTER — TELEPHONE (OUTPATIENT)
Dept: OTOLARYNGOLOGY | Facility: CLINIC | Age: 74
End: 2024-05-28
Payer: COMMERCIAL

## 2024-06-09 NOTE — PROGRESS NOTES
Subjective:       Patient ID: Thiago Thacker is a 74 y.o. male.    Chief Complaint: No chief complaint on file.      The patient is returning for 6 week follow-up visit.  There are multiple issues discuss:  1. Laryngal pharyngeal reflux: The patient i reports that his symptoms are significantly improved.  He still has some throat clearing but less than at last visit.  He has no longer having dysphagia or globus sensation.  When he was taking medications for both his allergies and the reflux he did not like the way the medicines made him feel, so he stopped the allergy medicines in his now taking the pantoprazole just once daily     2. Allergic rhinitis: The patient is using fluticasone nasal spray daily.  Is nasal symptoms have completely resolved  3. Migraine headaches: The patient is using sumatriptan on an as needed basis.  He has had no migraine headaches since his last visit           Review of Systems     Constitutional: Positive for fatigue.  Negative for appetite change, chills, fever and unexpected weight loss.      HENT: Positive for postnasal drip, trouble swallowing and voice change.  Negative for ear discharge, ear infection, ear pain, facial swelling, hearing loss, mouth sores, nosebleeds, ringing in the ears, runny nose, sinus infection, sinus pressure, sore throat, stuffy nose, tonsil infection and dental problems.  Frequent throat clearing   Phlegm in the throat      Eyes:  Negative for change in eyesight, eye drainage, eye itching and photophobia.     Respiratory:  Negative for cough, shortness of breath, sleep apnea, snoring and wheezing.      Cardiovascular:  Negative for chest pain, foot swelling, irregular heartbeat and swollen veins.     Gastrointestinal:  Negative for abdominal pain, acid reflux, constipation, diarrhea, heartburn and vomiting.     Genitourinary: Positive for frequent urination. Negative for difficulty urinating and sexual problems.     Musc: Positive for aching joints and  back pain. Negative for aching muscles and neck pain.     Skin: Negative for rash.     Allergy: Negative for food allergies and seasonal allergies.     Endocrine: Negative for cold intolerance and heat intolerance.      Neurological: Negative for dizziness, headaches, light-headedness, seizures and tremors.      Hematologic: Negative for bruises/bleeds easily and swollen glands.      Psychiatric: Negative for decreased concentration, depression, nervous/anxious and sleep disturbance.                Objective:      Physical Exam  Vitals and nursing note reviewed.   Constitutional:       General: He is awake.      Appearance: Normal appearance. He is well-developed, well-groomed and normal weight.   HENT:      Head: Normocephalic.      Jaw: There is normal jaw occlusion.      Salivary Glands: Right salivary gland is not diffusely enlarged or tender. Left salivary gland is not diffusely enlarged or tender.      Right Ear: Hearing, ear canal and external ear normal. Tympanic membrane is retracted.      Left Ear: Hearing, ear canal and external ear normal. Tympanic membrane is retracted.      Nose: Septal deviation (to the right), mucosal edema (cyanotic, boggy inferior turbinates bilaterally) and rhinorrhea (clear mucus bilaterally) present. No nasal deformity. Rhinorrhea is clear.      Right Turbinates: Enlarged and pale.      Left Turbinates: Enlarged and pale.      Mouth/Throat:      Lips: No lesions.      Mouth: No oral lesions.      Dentition: No gum lesions.      Tongue: No lesions.      Palate: No mass and lesions.      Pharynx: Oropharynx is clear. Uvula midline.   Eyes:      General: Lids are normal. Vision grossly intact.         Right eye: No discharge.         Left eye: No discharge.      Extraocular Movements: Extraocular movements intact.      Conjunctiva/sclera: Conjunctivae normal.      Pupils: Pupils are equal, round, and reactive to light.   Neck:      Thyroid: No thyroid mass or thyromegaly.       Trachea: Trachea normal. No tracheal deviation.   Cardiovascular:      Rate and Rhythm: Normal rate and regular rhythm.      Pulses: Normal pulses.      Heart sounds: Normal heart sounds.   Pulmonary:      Effort: Pulmonary effort is normal.      Breath sounds: Normal breath sounds. No stridor. No decreased breath sounds, wheezing, rhonchi or rales.   Abdominal:      General: Bowel sounds are normal.      Palpations: Abdomen is soft.      Tenderness: There is no abdominal tenderness.   Musculoskeletal:         General: Normal range of motion.      Cervical back: Normal range of motion and neck supple. No muscular tenderness.   Lymphadenopathy:      Head:      Right side of head: No submental, submandibular, preauricular, posterior auricular or occipital adenopathy.      Left side of head: No submental, submandibular, preauricular, posterior auricular or occipital adenopathy.      Cervical: No cervical adenopathy.   Skin:     General: Skin is warm and dry.      Findings: No petechiae or rash.      Nails: There is no clubbing.   Neurological:      Mental Status: He is alert and oriented to person, place, and time.      Cranial Nerves: No cranial nerve deficit.      Sensory: No sensory deficit.      Gait: Gait normal.   Psychiatric:         Speech: Speech normal.         Behavior: Behavior normal. Behavior is cooperative.         Thought Content: Thought content normal.         Judgment: Judgment normal.           Assessment:       1. Laryngopharyngeal reflux (LPR)    2. Throat clearing    3. Nasal septal deviation    4. Allergic rhinitis, unspecified seasonality, unspecified trigger          Plan:       I  will have the patient take the pantoprazole twice daily.  If it causes him to feel uncomfortable in any way he will use it only once daily.  He will continue with his anti-reflux diet and not eating for 2 hours before going to bed.  He will continue elevating the headboard of his bed.  I will recheck him in 6 weeks.   At that visit he need to undergo a flexible nasolaryngoscopy.                DISCLAIMER: This note was prepared with [employer] voice recognition transcription software. Garbled syntax, mangled pronouns, and other bizarre constructions may be attributed to that software system. While efforts were made to correct any mistakes made by this voice recognition program, some errors and/or omissions may remain in the note that were missed when the note was originally created.

## 2024-06-10 ENCOUNTER — OFFICE VISIT (OUTPATIENT)
Dept: OTOLARYNGOLOGY | Facility: CLINIC | Age: 74
End: 2024-06-10
Payer: COMMERCIAL

## 2024-06-10 VITALS
SYSTOLIC BLOOD PRESSURE: 129 MMHG | DIASTOLIC BLOOD PRESSURE: 75 MMHG | WEIGHT: 188.94 LBS | HEART RATE: 78 BPM | BODY MASS INDEX: 26.35 KG/M2 | OXYGEN SATURATION: 98 %

## 2024-06-10 DIAGNOSIS — J34.2 NASAL SEPTAL DEVIATION: ICD-10-CM

## 2024-06-10 DIAGNOSIS — R09.89 THROAT CLEARING: ICD-10-CM

## 2024-06-10 DIAGNOSIS — J30.9 ALLERGIC RHINITIS, UNSPECIFIED SEASONALITY, UNSPECIFIED TRIGGER: ICD-10-CM

## 2024-06-10 DIAGNOSIS — K21.9 LARYNGOPHARYNGEAL REFLUX (LPR): Primary | ICD-10-CM

## 2024-06-10 PROCEDURE — 1159F MED LIST DOCD IN RCRD: CPT | Mod: CPTII,S$GLB,, | Performed by: SPECIALIST

## 2024-06-10 PROCEDURE — 1101F PT FALLS ASSESS-DOCD LE1/YR: CPT | Mod: CPTII,S$GLB,, | Performed by: SPECIALIST

## 2024-06-10 PROCEDURE — 3074F SYST BP LT 130 MM HG: CPT | Mod: CPTII,S$GLB,, | Performed by: SPECIALIST

## 2024-06-10 PROCEDURE — 99999 PR PBB SHADOW E&M-EST. PATIENT-LVL IV: CPT | Mod: PBBFAC,,, | Performed by: SPECIALIST

## 2024-06-10 PROCEDURE — 99213 OFFICE O/P EST LOW 20 MIN: CPT | Mod: S$GLB,,, | Performed by: SPECIALIST

## 2024-06-10 PROCEDURE — 3288F FALL RISK ASSESSMENT DOCD: CPT | Mod: CPTII,S$GLB,, | Performed by: SPECIALIST

## 2024-06-10 PROCEDURE — 3078F DIAST BP <80 MM HG: CPT | Mod: CPTII,S$GLB,, | Performed by: SPECIALIST

## 2024-06-10 PROCEDURE — 1126F AMNT PAIN NOTED NONE PRSNT: CPT | Mod: CPTII,S$GLB,, | Performed by: SPECIALIST

## 2024-06-10 PROCEDURE — 3008F BODY MASS INDEX DOCD: CPT | Mod: CPTII,S$GLB,, | Performed by: SPECIALIST

## 2024-06-10 PROCEDURE — 1160F RVW MEDS BY RX/DR IN RCRD: CPT | Mod: CPTII,S$GLB,, | Performed by: SPECIALIST

## 2024-07-05 ENCOUNTER — TELEPHONE (OUTPATIENT)
Dept: CARDIOLOGY | Facility: HOSPITAL | Age: 74
End: 2024-07-05
Payer: COMMERCIAL

## 2024-07-08 ENCOUNTER — HOSPITAL ENCOUNTER (OUTPATIENT)
Dept: CARDIOLOGY | Facility: HOSPITAL | Age: 74
Discharge: HOME | End: 2024-07-08
Attending: INTERNAL MEDICINE
Payer: COMMERCIAL

## 2024-07-08 VITALS
HEART RATE: 75 BPM | SYSTOLIC BLOOD PRESSURE: 133 MMHG | DIASTOLIC BLOOD PRESSURE: 67 MMHG | HEIGHT: 71 IN | BODY MASS INDEX: 26.32 KG/M2 | WEIGHT: 188 LBS

## 2024-07-08 DIAGNOSIS — I25.119 ATHEROSCLEROSIS OF NATIVE CORONARY ARTERY OF NATIVE HEART WITH ANGINA PECTORIS: ICD-10-CM

## 2024-07-08 LAB
CFR FLOW - ANTERIOR: 2.4
CFR FLOW - INFERIOR: 2.06
CFR FLOW - LATERAL: 2.21
CFR FLOW - MAX: 2.76
CFR FLOW - MIN: 1.77
CFR FLOW - SEPTAL: 2.26
CFR FLOW - WHOLE HEART: 2.23
CV PHARM DOSE: 0.4 MG
CV STRESS BASE HR: 75 BPM
DIASTOLIC BLOOD PRESSURE: 67 MMHG
EJECTION FRACTION- HIGH: 59 %
END DIASTOLIC INDEX-HIGH: 155 ML/M2
END DIASTOLIC INDEX-LOW: 91 ML/M2
END SYSTOLIC INDEX-HIGH: 78 ML/M2
END SYSTOLIC INDEX-LOW: 40 ML/M2
NUC REST DIASTOLIC VOLUME INDEX: 128
NUC REST EJECTION FRACTION: 68
NUC REST SYSTOLIC VOLUME INDEX: 41
NUC STRESS DIASTOLIC VOLUME INDEX: 127
NUC STRESS EJECTION FRACTION: 71 %
NUC STRESS SYSTOLIC VOLUME INDEX: 36
OHS CV CPX 85 PERCENT MAX PREDICTED HEART RATE MALE: 126
OHS CV CPX MAX PREDICTED HEART RATE: 148
OHS CV CPX PATIENT IS FEMALE: 0
OHS CV CPX PATIENT IS MALE: 1
OHS CV CPX PEAK DIASTOLIC BLOOD PRESSURE: 61 MMHG
OHS CV CPX PEAK HEAR RATE: 76 BPM
OHS CV CPX PEAK RATE PRESSURE PRODUCT: 9272
OHS CV CPX PEAK SYSTOLIC BLOOD PRESSURE: 122 MMHG
OHS CV CPX PERCENT MAX PREDICTED HEART RATE ACHIEVED: 51
OHS CV CPX RATE PRESSURE PRODUCT PRESENTING: 9975
REST FLOW - ANTERIOR: 0.87 CC/MIN/G
REST FLOW - INFERIOR: 0.83 CC/MIN/G
REST FLOW - LATERAL: 1.05 CC/MIN/G
REST FLOW - MAX: 1.37 CC/MIN/G
REST FLOW - MIN: 0.38 CC/MIN/G
REST FLOW - SEPTAL: 0.72 CC/MIN/G
REST FLOW - WHOLE HEART: 0.87 CC/MIN/G
RETIRED EF AND QEF - SEE NOTES: 47 %
STRESS FLOW - ANTERIOR: 2.09 CC/MIN/G
STRESS FLOW - INFERIOR: 1.69 CC/MIN/G
STRESS FLOW - LATERAL: 2.31 CC/MIN/G
STRESS FLOW - MAX: 2.91 CC/MIN/G
STRESS FLOW - MIN: 0.84 CC/MIN/G
STRESS FLOW - SEPTAL: 1.62 CC/MIN/G
STRESS FLOW - WHOLE HEART: 1.93 CC/MIN/G
SYSTOLIC BLOOD PRESSURE: 133 MMHG

## 2024-07-08 PROCEDURE — 78434 AQMBF PET REST & RX STRESS: CPT

## 2024-07-08 PROCEDURE — 78434 AQMBF PET REST & RX STRESS: CPT | Mod: 26,,, | Performed by: INTERNAL MEDICINE

## 2024-07-08 PROCEDURE — 78431 MYOCRD IMG PET RST&STRS CT: CPT | Mod: 26,,, | Performed by: INTERNAL MEDICINE

## 2024-07-08 PROCEDURE — 93018 CV STRESS TEST I&R ONLY: CPT | Mod: ,,, | Performed by: INTERNAL MEDICINE

## 2024-07-08 PROCEDURE — A9555 RB82 RUBIDIUM: HCPCS | Performed by: INTERNAL MEDICINE

## 2024-07-08 PROCEDURE — 93016 CV STRESS TEST SUPVJ ONLY: CPT | Mod: ,,, | Performed by: INTERNAL MEDICINE

## 2024-07-08 PROCEDURE — 63600175 PHARM REV CODE 636 W HCPCS: Performed by: INTERNAL MEDICINE

## 2024-07-08 RX ORDER — REGADENOSON 0.08 MG/ML
0.4 INJECTION, SOLUTION INTRAVENOUS
Status: COMPLETED | OUTPATIENT
Start: 2024-07-08 | End: 2024-07-08

## 2024-07-08 RX ADMIN — RUBIDIUM CHLORIDE RB-82 25.9 MILLICURIE: 150 INJECTION, SOLUTION INTRAVENOUS at 08:07

## 2024-07-08 RX ADMIN — REGADENOSON 0.4 MG: 0.08 INJECTION, SOLUTION INTRAVENOUS at 08:07

## 2024-07-16 ENCOUNTER — PATIENT MESSAGE (OUTPATIENT)
Dept: ADMINISTRATIVE | Facility: OTHER | Age: 74
End: 2024-07-16
Payer: COMMERCIAL

## 2024-07-20 DIAGNOSIS — I25.2 HX OF MYOCARDIAL INFARCTION: ICD-10-CM

## 2024-07-20 DIAGNOSIS — R35.1 BENIGN PROSTATIC HYPERPLASIA WITH NOCTURIA: ICD-10-CM

## 2024-07-20 DIAGNOSIS — N52.9 ERECTILE DYSFUNCTION, UNSPECIFIED ERECTILE DYSFUNCTION TYPE: ICD-10-CM

## 2024-07-20 DIAGNOSIS — N40.1 BENIGN PROSTATIC HYPERPLASIA WITH NOCTURIA: ICD-10-CM

## 2024-07-20 RX ORDER — FINASTERIDE 5 MG/1
5 TABLET, FILM COATED ORAL
Qty: 90 TABLET | Refills: 0 | OUTPATIENT
Start: 2024-07-20

## 2024-07-20 RX ORDER — TADALAFIL 5 MG/1
5 TABLET ORAL DAILY PRN
Qty: 90 TABLET | Refills: 0 | OUTPATIENT
Start: 2024-07-20

## 2024-07-20 RX ORDER — ATORVASTATIN CALCIUM 40 MG/1
40 TABLET, FILM COATED ORAL
Qty: 90 TABLET | Refills: 0 | OUTPATIENT
Start: 2024-07-20

## 2024-07-20 NOTE — TELEPHONE ENCOUNTER
No care due was identified.  [facility] Embedded Care Due Messages. Reference number: [id number].   7/20/2024 9:36:18 AM CDT

## 2024-07-20 NOTE — TELEPHONE ENCOUNTER
Refill Decision Note   Thiago Salitz  is requesting a refill authorization.  Brief Assessment and Rationale for Refill:  Quick Discontinue     Medication Therapy Plan:  PT needs appt per MD    Medication Reconciliation Completed: No   Comments:     No Care Gaps recommended.     Note composed:1:09 PM 07/20/2024

## 2024-07-23 DIAGNOSIS — I25.2 HX OF MYOCARDIAL INFARCTION: ICD-10-CM

## 2024-07-23 DIAGNOSIS — R35.1 BENIGN PROSTATIC HYPERPLASIA WITH NOCTURIA: ICD-10-CM

## 2024-07-23 DIAGNOSIS — N40.1 BENIGN PROSTATIC HYPERPLASIA WITH NOCTURIA: ICD-10-CM

## 2024-07-23 DIAGNOSIS — N52.9 ERECTILE DYSFUNCTION, UNSPECIFIED ERECTILE DYSFUNCTION TYPE: ICD-10-CM

## 2024-07-23 RX ORDER — TADALAFIL 5 MG/1
5 TABLET ORAL DAILY PRN
Qty: 90 TABLET | Refills: 0 | OUTPATIENT
Start: 2024-07-23

## 2024-07-23 RX ORDER — ATORVASTATIN CALCIUM 40 MG/1
40 TABLET, FILM COATED ORAL DAILY
Qty: 90 TABLET | Refills: 0 | OUTPATIENT
Start: 2024-07-23

## 2024-07-23 RX ORDER — FINASTERIDE 5 MG/1
5 TABLET, FILM COATED ORAL DAILY
Qty: 90 TABLET | Refills: 0 | OUTPATIENT
Start: 2024-07-23

## 2024-07-23 NOTE — TELEPHONE ENCOUNTER
No care due was identified.  [facility] Embedded Care Due Messages. Reference number: [id number].   7/23/2024 4:15:57 PM CDT

## 2024-07-25 ENCOUNTER — OFFICE VISIT (OUTPATIENT)
Dept: OTOLARYNGOLOGY | Facility: CLINIC | Age: 74
End: 2024-07-25
Payer: COMMERCIAL

## 2024-07-25 VITALS
BODY MASS INDEX: 26.09 KG/M2 | WEIGHT: 187.06 LBS | HEART RATE: 79 BPM | OXYGEN SATURATION: 97 % | DIASTOLIC BLOOD PRESSURE: 70 MMHG | SYSTOLIC BLOOD PRESSURE: 144 MMHG

## 2024-07-25 DIAGNOSIS — R09.89 PHLEGM IN THROAT: ICD-10-CM

## 2024-07-25 DIAGNOSIS — R13.10 DYSPHAGIA, UNSPECIFIED TYPE: ICD-10-CM

## 2024-07-25 DIAGNOSIS — R49.0 HOARSENESS: ICD-10-CM

## 2024-07-25 DIAGNOSIS — K21.9 LARYNGOPHARYNGEAL REFLUX (LPR): Primary | ICD-10-CM

## 2024-07-25 DIAGNOSIS — R09.89 THROAT CLEARING: ICD-10-CM

## 2024-07-25 DIAGNOSIS — J30.9 ALLERGIC RHINITIS, UNSPECIFIED SEASONALITY, UNSPECIFIED TRIGGER: ICD-10-CM

## 2024-07-25 DIAGNOSIS — J34.2 NASAL SEPTAL DEVIATION: ICD-10-CM

## 2024-07-25 PROCEDURE — 3008F BODY MASS INDEX DOCD: CPT | Mod: CPTII,S$GLB,, | Performed by: SPECIALIST

## 2024-07-25 PROCEDURE — 31575 DIAGNOSTIC LARYNGOSCOPY: CPT | Mod: S$GLB,,, | Performed by: SPECIALIST

## 2024-07-25 PROCEDURE — 1160F RVW MEDS BY RX/DR IN RCRD: CPT | Mod: CPTII,S$GLB,, | Performed by: SPECIALIST

## 2024-07-25 PROCEDURE — 1101F PT FALLS ASSESS-DOCD LE1/YR: CPT | Mod: CPTII,S$GLB,, | Performed by: SPECIALIST

## 2024-07-25 PROCEDURE — 3288F FALL RISK ASSESSMENT DOCD: CPT | Mod: CPTII,S$GLB,, | Performed by: SPECIALIST

## 2024-07-25 PROCEDURE — 3078F DIAST BP <80 MM HG: CPT | Mod: CPTII,S$GLB,, | Performed by: SPECIALIST

## 2024-07-25 PROCEDURE — 99999 PR PBB SHADOW E&M-EST. PATIENT-LVL IV: CPT | Mod: PBBFAC,,, | Performed by: SPECIALIST

## 2024-07-25 PROCEDURE — 3077F SYST BP >= 140 MM HG: CPT | Mod: CPTII,S$GLB,, | Performed by: SPECIALIST

## 2024-07-25 PROCEDURE — 1126F AMNT PAIN NOTED NONE PRSNT: CPT | Mod: CPTII,S$GLB,, | Performed by: SPECIALIST

## 2024-07-25 PROCEDURE — 1159F MED LIST DOCD IN RCRD: CPT | Mod: CPTII,S$GLB,, | Performed by: SPECIALIST

## 2024-07-25 PROCEDURE — 99214 OFFICE O/P EST MOD 30 MIN: CPT | Mod: 25,S$GLB,, | Performed by: SPECIALIST

## 2024-07-25 NOTE — PROGRESS NOTES
Subjective:       Patient ID: Thiago Thacker is a 74 y.o. male.    Chief Complaint: No chief complaint on file.      The patient is returning for 6 week follow-up visit.  There are multiple issues discuss:  1. Laryngal pharyngeal reflux: The patient i reports that his symptoms are significantly improved.  He still has some throat clearing but less than at last visit.  He has no longer having dysphagia or globus sensation.  He has been taking pantoprazole twice daily.  He has not been good with his diet.  He came to use to drink coffee in energy drinks    2. Allergic rhinitis: The patient is using fluticasone nasal spray daily.  Is nasal symptoms have completely resolved  3. Migraine headaches: The patient is using sumatriptan on an as needed basis.  He has had no migraine headaches since his last visit           Review of Systems     Constitutional: Positive for fatigue.  Negative for appetite change, chills, fever and unexpected weight loss.      HENT: Positive for postnasal drip, trouble swallowing and voice change.  Negative for ear discharge, ear infection, ear pain, facial swelling, hearing loss, mouth sores, nosebleeds, ringing in the ears, runny nose, sinus infection, sinus pressure, sore throat, stuffy nose, tonsil infection and dental problems.  Frequent throat clearing   Phlegm in the throat      Eyes:  Negative for change in eyesight, eye drainage, eye itching and photophobia.     Respiratory:  Negative for cough, shortness of breath, sleep apnea, snoring and wheezing.      Cardiovascular:  Negative for chest pain, foot swelling, irregular heartbeat and swollen veins.     Gastrointestinal:  Negative for abdominal pain, acid reflux, constipation, diarrhea, heartburn and vomiting.     Genitourinary: Positive for frequent urination. Negative for difficulty urinating and sexual problems.     Musc: Positive for aching joints and back pain. Negative for aching muscles and neck pain.     Skin: Negative for  rash.     Allergy: Negative for food allergies and seasonal allergies.     Endocrine: Negative for cold intolerance and heat intolerance.      Neurological: Negative for dizziness, headaches, light-headedness, seizures and tremors.      Hematologic: Negative for bruises/bleeds easily and swollen glands.      Psychiatric: Negative for decreased concentration, depression, nervous/anxious and sleep disturbance.                Objective:      Physical Exam  Vitals and nursing note reviewed.   Constitutional:       General: He is awake.      Appearance: Normal appearance. He is well-developed, well-groomed and normal weight.   HENT:      Head: Normocephalic.      Jaw: There is normal jaw occlusion.      Salivary Glands: Right salivary gland is not diffusely enlarged or tender. Left salivary gland is not diffusely enlarged or tender.      Right Ear: Hearing, ear canal and external ear normal. Tympanic membrane is retracted.      Left Ear: Hearing, ear canal and external ear normal. Tympanic membrane is retracted.      Nose: Septal deviation (to the right), mucosal edema (cyanotic, boggy inferior turbinates bilaterally) and rhinorrhea (clear mucus bilaterally) present. No nasal deformity. Rhinorrhea is clear.      Right Turbinates: Enlarged and pale.      Left Turbinates: Enlarged and pale.      Mouth/Throat:      Lips: No lesions.      Mouth: No oral lesions.      Dentition: No gum lesions.      Tongue: No lesions.      Palate: No mass and lesions.      Pharynx: Oropharynx is clear. Uvula midline.   Eyes:      General: Lids are normal. Vision grossly intact.         Right eye: No discharge.         Left eye: No discharge.      Extraocular Movements: Extraocular movements intact.      Conjunctiva/sclera: Conjunctivae normal.      Pupils: Pupils are equal, round, and reactive to light.   Neck:      Thyroid: No thyroid mass or thyromegaly.      Trachea: Trachea normal. No tracheal deviation.   Cardiovascular:      Rate and  Rhythm: Normal rate and regular rhythm.      Pulses: Normal pulses.      Heart sounds: Normal heart sounds.   Pulmonary:      Effort: Pulmonary effort is normal.      Breath sounds: Normal breath sounds. No stridor. No decreased breath sounds, wheezing, rhonchi or rales.   Abdominal:      General: Bowel sounds are normal.      Palpations: Abdomen is soft.      Tenderness: There is no abdominal tenderness.   Musculoskeletal:         General: Normal range of motion.      Cervical back: Normal range of motion and neck supple. No muscular tenderness.   Lymphadenopathy:      Head:      Right side of head: No submental, submandibular, preauricular, posterior auricular or occipital adenopathy.      Left side of head: No submental, submandibular, preauricular, posterior auricular or occipital adenopathy.      Cervical: No cervical adenopathy.   Skin:     General: Skin is warm and dry.      Findings: No petechiae or rash.      Nails: There is no clubbing.   Neurological:      Mental Status: He is alert and oriented to person, place, and time.      Cranial Nerves: No cranial nerve deficit.      Sensory: No sensory deficit.      Gait: Gait normal.   Psychiatric:         Speech: Speech normal.         Behavior: Behavior normal. Behavior is cooperative.         Thought Content: Thought content normal.         Judgment: Judgment normal.         Flexible Nasolaryngoscopy:  Nasal septal deviation and nasal changes allergic rhinitis; laryngeal changes consistent with laryngal pharyngeal reflux that is improving on b.i.d. PPI therapy    Laryngeal pictures:                  Nasopharynx/torus tubarius pictures:          Right nasal passage pictures:          Left nasal passage pictures:              Assessment:       1. Laryngopharyngeal reflux (LPR)    2. Dysphagia, unspecified type    3. Hoarseness    4. Phlegm in throat    5. Throat clearing    6. Allergic rhinitis, unspecified seasonality, unspecified trigger          Plan:       I   will have the patient continue with his present drug regimen.  I am encouraging him to stop drinking coughing in the energy drinks.  I will recheck him in 6 weeks, or sooner on an as-needed basis                DISCLAIMER: This note was prepared with [email or web address] voice recognition transcription software. Garbled syntax, mangled pronouns, and other bizarre constructions may be attributed to that software system. While efforts were made to correct any mistakes made by this voice recognition program, some errors and/or omissions may remain in the note that were missed when the note was originally created.

## 2024-07-26 NOTE — PROCEDURES
"Laryngoscopy    Date/Time: 7/25/2024 9:00 AM    Performed by: GERMÁN Felder MD  Authorized by: GERMÁN Felder MD    Time out: Immediately prior to procedure a "time out" was called to verify the correct patient, procedure, equipment, support staff and site/side marked as required.    Consent Done?:  Yes (Verbal)  Anesthesia:     Local anesthetic:  4% Xylocaine spray with Fidencio-Synephrine    Patient tolerance:  Patient tolerated the procedure well with no immediate complications    Decongestion performed?: Yes    Laryngoscopy:     Areas examined:  Larynx, hypopharynx, oropharynx, nasopharynx, nasal cavities and vocal cords    Laryngoscope size:  4 mm (Flexible video nasolaryngoscopy)  Nose External:      No external nasal deformity  Nose Intranasal:      Mucosa no polyps     Mucosa ulcers not present     No mucosa lesions present (clear mucus in the nasal passages bilaterally)     Septum gross deformity (septum deviated to the right)     Enlarged turbinates (inferior turbinates enlarged bilaterally)  Nasopharynx:      No mucosa lesions     Adenoids present     Posterior choanae patent     Eustachian tube patent  Larynx/hypopharynx:      No epiglottis lesions     No epiglottis edema     No AE folds lesions     No vocal cord polyps     Equal and normal bilateral (vocal cords move symmetrically, no mucosal lesions)     No hypopharynx lesions     No piriform sinus pooling     No piriform sinus lesions     Post cricoid edema (mild, improved from last endoscopy)     Post cricoid erythema (maneuver for mild, improved from last endoscopy)     Flexible video nasolaryngoscopy-nasal changes of allergic rhinitis and nasal septal deviation, laryngeal changes consistent with laryngal pharyngeal reflux that is improving on b.i.d. PPI therapy    "

## 2024-07-30 ENCOUNTER — OFFICE VISIT (OUTPATIENT)
Dept: PRIMARY CARE CLINIC | Facility: CLINIC | Age: 74
End: 2024-07-30
Payer: COMMERCIAL

## 2024-07-30 DIAGNOSIS — N40.1 BENIGN PROSTATIC HYPERPLASIA WITH NOCTURIA: ICD-10-CM

## 2024-07-30 DIAGNOSIS — E03.9 HYPOTHYROIDISM, UNSPECIFIED TYPE: Primary | ICD-10-CM

## 2024-07-30 DIAGNOSIS — D69.6 THROMBOCYTOPENIA: ICD-10-CM

## 2024-07-30 DIAGNOSIS — N52.9 ERECTILE DYSFUNCTION, UNSPECIFIED ERECTILE DYSFUNCTION TYPE: ICD-10-CM

## 2024-07-30 DIAGNOSIS — D84.821 IMMUNODEFICIENCY DUE TO DRUG THERAPY: ICD-10-CM

## 2024-07-30 DIAGNOSIS — R35.1 BENIGN PROSTATIC HYPERPLASIA WITH NOCTURIA: ICD-10-CM

## 2024-07-30 DIAGNOSIS — Z79.899 IMMUNODEFICIENCY DUE TO DRUG THERAPY: ICD-10-CM

## 2024-07-30 DIAGNOSIS — I25.10 ATHEROSCLEROSIS OF NATIVE CORONARY ARTERY OF NATIVE HEART WITHOUT ANGINA PECTORIS: ICD-10-CM

## 2024-07-30 PROBLEM — R21 RASH OF NECK: Status: RESOLVED | Noted: 2023-07-10 | Resolved: 2024-07-30

## 2024-07-30 PROCEDURE — 99214 OFFICE O/P EST MOD 30 MIN: CPT | Mod: 95,,, | Performed by: STUDENT IN AN ORGANIZED HEALTH CARE EDUCATION/TRAINING PROGRAM

## 2024-07-30 PROCEDURE — 1160F RVW MEDS BY RX/DR IN RCRD: CPT | Mod: CPTII,95,, | Performed by: STUDENT IN AN ORGANIZED HEALTH CARE EDUCATION/TRAINING PROGRAM

## 2024-07-30 PROCEDURE — 1159F MED LIST DOCD IN RCRD: CPT | Mod: CPTII,95,, | Performed by: STUDENT IN AN ORGANIZED HEALTH CARE EDUCATION/TRAINING PROGRAM

## 2024-07-30 RX ORDER — FINASTERIDE 5 MG/1
5 TABLET, FILM COATED ORAL DAILY
Qty: 90 TABLET | Refills: 3 | Status: SHIPPED | OUTPATIENT
Start: 2024-07-30

## 2024-07-30 RX ORDER — TADALAFIL 5 MG/1
5 TABLET ORAL DAILY PRN
Qty: 90 TABLET | Refills: 1 | Status: SHIPPED | OUTPATIENT
Start: 2024-07-30

## 2024-07-30 RX ORDER — ATORVASTATIN CALCIUM 40 MG/1
40 TABLET, FILM COATED ORAL DAILY
Qty: 90 TABLET | Refills: 3 | Status: SHIPPED | OUTPATIENT
Start: 2024-07-30

## 2024-07-30 RX ORDER — LEVOTHYROXINE SODIUM 25 UG/1
25 TABLET ORAL
Qty: 90 TABLET | Refills: 3 | Status: SHIPPED | OUTPATIENT
Start: 2024-07-30

## 2024-07-30 RX ORDER — LEVOTHYROXINE SODIUM 200 UG/1
200 TABLET ORAL
Qty: 90 TABLET | Refills: 3 | Status: SHIPPED | OUTPATIENT
Start: 2024-07-30

## 2024-08-02 ENCOUNTER — PATIENT MESSAGE (OUTPATIENT)
Dept: PRIMARY CARE CLINIC | Facility: CLINIC | Age: 74
End: 2024-08-02
Payer: COMMERCIAL

## 2024-08-04 ENCOUNTER — PATIENT MESSAGE (OUTPATIENT)
Dept: PRIMARY CARE CLINIC | Facility: CLINIC | Age: 74
End: 2024-08-04
Payer: COMMERCIAL

## 2024-08-09 ENCOUNTER — PATIENT MESSAGE (OUTPATIENT)
Dept: ADMINISTRATIVE | Facility: OTHER | Age: 74
End: 2024-08-09
Payer: COMMERCIAL

## 2024-08-27 DIAGNOSIS — Z00.00 ENCOUNTER FOR MEDICARE ANNUAL WELLNESS EXAM: ICD-10-CM

## 2025-02-10 DIAGNOSIS — E03.9 HYPOTHYROIDISM, UNSPECIFIED TYPE: ICD-10-CM

## 2025-02-11 RX ORDER — LEVOTHYROXINE SODIUM 25 UG/1
25 TABLET ORAL
Qty: 90 TABLET | Refills: 3 | Status: SHIPPED | OUTPATIENT
Start: 2025-02-11

## 2025-02-11 RX ORDER — LEVOTHYROXINE SODIUM 200 UG/1
200 TABLET ORAL
Qty: 90 TABLET | Refills: 3 | Status: SHIPPED | OUTPATIENT
Start: 2025-02-11

## 2025-02-11 NOTE — TELEPHONE ENCOUNTER
Care Due:                  Date            Visit Type   Department     Provider  --------------------------------------------------------------------------------                                ESTABLISHED                              PATIENT -    [facility]  Last Visit: 07-      [facility]           Kristyn Reyes  Next Visit: None Scheduled  None         None Found                                                            Last  Test          Frequency    Reason                     Performed    Due Date  --------------------------------------------------------------------------------    CMP.........  12 months..  atorvastatin.............  08- 08-    Lipid Panel.  12 months..  atorvastatin.............  07-   07-    TSH.........  12 months..  levothyroxine............  04- 03-    [facility] Embedded Care Due Messages. Reference number: [id number].   2/10/2025 6:45:32 PM CST

## 2025-02-15 DIAGNOSIS — N52.9 ERECTILE DYSFUNCTION, UNSPECIFIED ERECTILE DYSFUNCTION TYPE: ICD-10-CM

## 2025-02-15 DIAGNOSIS — N40.1 BENIGN PROSTATIC HYPERPLASIA WITH NOCTURIA: ICD-10-CM

## 2025-02-15 DIAGNOSIS — R35.1 BENIGN PROSTATIC HYPERPLASIA WITH NOCTURIA: ICD-10-CM

## 2025-02-15 NOTE — TELEPHONE ENCOUNTER
No care due was identified.  [facility] Embedded Care Due Messages. Reference number: [id number].   2/15/2025 11:19:08 AM CST

## 2025-02-17 RX ORDER — TADALAFIL 5 MG/1
5 TABLET ORAL DAILY PRN
Qty: 90 TABLET | Refills: 1 | Status: SHIPPED | OUTPATIENT
Start: 2025-02-17

## 2025-02-24 DIAGNOSIS — Z00.00 ENCOUNTER FOR MEDICARE ANNUAL WELLNESS EXAM: ICD-10-CM

## (undated) DEVICE — DRAPE ABDOMINAL TIBURON 14X11

## (undated) DEVICE — Device

## (undated) DEVICE — SOL BETADINE 5%

## (undated) DEVICE — SYR LUER LOCK 1CC

## (undated) DEVICE — COVER LIGHT HANDLE

## (undated) DEVICE — TOWEL OR DISP STRL BLUE 4/PK

## (undated) DEVICE — DRAPE COLUMN DAVINCI XI

## (undated) DEVICE — SYS SMOKE EVACUATION LAP

## (undated) DEVICE — GLOVE BIOGEL ECLIPSE SZ 6.5

## (undated) DEVICE — SEAL UNIVERSAL 5MM-8MM XI

## (undated) DEVICE — DRESSING TRANS 2X2 TEGADERM

## (undated) DEVICE — BLADE SURG CARBON STEEL SZ11

## (undated) DEVICE — NDL HYPO REG 25G X 1 1/2

## (undated) DEVICE — SUT MCRYL PLUS 4-0 PS2 27IN

## (undated) DEVICE — DRAPE STERI INSTRUMENT 1018

## (undated) DEVICE — OBTURATOR BLADELESS 8MM XI CLR

## (undated) DEVICE — DRAPE OPHTHALMIC 48X62 FEN

## (undated) DEVICE — GOWN POLY REINF BRTH SLV XL

## (undated) DEVICE — COVER TIP CURVED SCISSORS XI

## (undated) DEVICE — SUT 0 VICRYL / UR6 (J603)

## (undated) DEVICE — SOL ELECTROLUBE ANTI-STIC

## (undated) DEVICE — GLASSES EYE PROTECTIVE

## (undated) DEVICE — SYR SLIP TIP 1CC

## (undated) DEVICE — DRAPE ARM DAVINCI XI

## (undated) DEVICE — SUT V-LOC 180 ABD 2/0 GS-21

## (undated) DEVICE — KIT ANTIFOG W/SPONG & FLUID

## (undated) DEVICE — TRAY MINOR GEN SURG OMC

## (undated) DEVICE — DRAPE SCOPE PILLOW WARMER

## (undated) DEVICE — ELECTRODE REM PLYHSV RETURN 9

## (undated) DEVICE — SKINMARKER & RULER REGULAR X-F